# Patient Record
Sex: FEMALE | Race: WHITE
[De-identification: names, ages, dates, MRNs, and addresses within clinical notes are randomized per-mention and may not be internally consistent; named-entity substitution may affect disease eponyms.]

---

## 2017-03-23 ENCOUNTER — HOSPITAL ENCOUNTER (OUTPATIENT)
Dept: HOSPITAL 53 - M LABDRAWP | Age: 82
End: 2017-03-23
Attending: FAMILY MEDICINE
Payer: MEDICARE

## 2017-03-23 DIAGNOSIS — G31.84: ICD-10-CM

## 2017-03-23 DIAGNOSIS — E55.9: ICD-10-CM

## 2017-03-23 DIAGNOSIS — E78.2: Primary | ICD-10-CM

## 2017-03-23 LAB
ALBUMIN SERPL BCG-MCNC: 3.4 GM/DL (ref 3.2–5.2)
ALBUMIN/GLOB SERPL: 1.42 {RATIO} (ref 1–1.93)
ALP SERPL-CCNC: 83 U/L (ref 45–117)
ALT SERPL W P-5'-P-CCNC: 24 U/L (ref 12–78)
ANION GAP SERPL CALC-SCNC: 6 MEQ/L (ref 8–16)
AST SERPL-CCNC: 21 U/L (ref 15–37)
BILIRUB SERPL-MCNC: 0.6 MG/DL (ref 0.2–1)
BUN SERPL-MCNC: 15 MG/DL (ref 7–18)
CALCIUM SERPL-MCNC: 8.5 MG/DL (ref 8.8–10.2)
CHLORIDE SERPL-SCNC: 107 MEQ/L (ref 98–107)
CO2 SERPL-SCNC: 32 MEQ/L (ref 21–32)
CREAT SERPL-MCNC: 0.75 MG/DL (ref 0.55–1.02)
GFR SERPL CREATININE-BSD FRML MDRD: > 60 ML/MIN/{1.73_M2} (ref 39–?)
GLUCOSE SERPL-MCNC: 77 MG/DL (ref 83–110)
POTASSIUM SERPL-SCNC: 4.2 MEQ/L (ref 3.5–5.1)
PROT SERPL-MCNC: 5.8 GM/DL (ref 6.4–8.2)
SODIUM SERPL-SCNC: 145 MEQ/L (ref 136–145)
T4 FREE SERPL-MCNC: 0.79 NG/DL (ref 0.76–1.46)
VIT B12 SERPL-MCNC: 414 PG/ML (ref 247–911)

## 2017-03-24 LAB — PRETREATED FOLATE FOR RBCFOL: 11.9 NG/ML

## 2017-04-20 ENCOUNTER — HOSPITAL ENCOUNTER (OUTPATIENT)
Dept: HOSPITAL 53 - M RAD | Age: 82
End: 2017-04-20
Attending: PHYSICIAN ASSISTANT
Payer: MEDICARE

## 2017-04-20 DIAGNOSIS — R90.89: ICD-10-CM

## 2017-04-20 DIAGNOSIS — R20.0: Primary | ICD-10-CM

## 2017-04-20 PROCEDURE — 70450 CT HEAD/BRAIN W/O DYE: CPT

## 2017-04-20 NOTE — REP
NONCONTRAST BRAIN CT:

 

HISTORY:  Left arm numbness.

 

COMPARISON STUDY:  September 28, 2016.

 

CT FINDINGS:  Digital lateral  radiograph is unremarkable.  There is fairly

advanced vascular calcification in the distal vertebral and distal carotid

arteries.  No bony calvarial lesion or fracture is seen.  Visualized paranasal

sinuses are clear.  No intraorbital abnormality is seen.

 

On soft tissue window settings, there is moderate diffuse cerebral atrophy as

seen previously.  There are also moderate periventricular low density changes

consistent with small vessel atherosclerotic changes.  These are unchanged as

well from the comparison CT study.  There is no evidence of intracranial

hemorrhage.  No extra-axial fluid collection is seen.  No acute infarct is

visible.  No mass or midline shift is seen.

 

IMPRESSION: Moderate diffuse atrophy, vascular calcification, small vessel

changes.  No change from September 28, 2016.  No acute intracranial abnormality.

 

 

Signed by

Pop Ramsey MD 04/20/2017 12:41 P

## 2017-07-10 ENCOUNTER — HOSPITAL ENCOUNTER (OUTPATIENT)
Dept: HOSPITAL 53 - M SFHCWAGY | Age: 82
End: 2017-07-10
Attending: NURSE PRACTITIONER
Payer: MEDICARE

## 2017-07-10 DIAGNOSIS — R31.0: Primary | ICD-10-CM

## 2017-07-10 PROCEDURE — 87086 URINE CULTURE/COLONY COUNT: CPT

## 2017-07-10 PROCEDURE — 81002 URINALYSIS NONAUTO W/O SCOPE: CPT

## 2017-08-14 ENCOUNTER — HOSPITAL ENCOUNTER (OUTPATIENT)
Dept: HOSPITAL 53 - M SFHCPLAZ | Age: 82
End: 2017-08-14
Attending: FAMILY MEDICINE
Payer: MEDICARE

## 2017-08-14 DIAGNOSIS — E78.2: Primary | ICD-10-CM

## 2017-08-14 DIAGNOSIS — E55.9: ICD-10-CM

## 2017-08-14 LAB
ALBUMIN SERPL BCG-MCNC: 3.6 GM/DL (ref 3.2–5.2)
ALBUMIN/GLOB SERPL: 1.44 {RATIO} (ref 1–1.93)
ALP SERPL-CCNC: 100 U/L (ref 45–117)
ALT SERPL W P-5'-P-CCNC: 46 U/L (ref 12–78)
ANION GAP SERPL CALC-SCNC: 9 MEQ/L (ref 8–16)
AST SERPL-CCNC: 34 U/L (ref 15–37)
BILIRUB SERPL-MCNC: 0.7 MG/DL (ref 0.2–1)
BUN SERPL-MCNC: 17 MG/DL (ref 7–18)
CALCIUM SERPL-MCNC: 8.4 MG/DL (ref 8.8–10.2)
CHLORIDE SERPL-SCNC: 107 MEQ/L (ref 98–107)
CHOLEST SERPL-MCNC: 151 MG/DL (ref ?–200)
CO2 SERPL-SCNC: 29 MEQ/L (ref 21–32)
CREAT SERPL-MCNC: 0.64 MG/DL (ref 0.55–1.02)
GFR SERPL CREATININE-BSD FRML MDRD: > 60 ML/MIN/{1.73_M2} (ref 32–?)
GLUCOSE SERPL-MCNC: 82 MG/DL (ref 83–110)
POTASSIUM SERPL-SCNC: 4.3 MEQ/L (ref 3.5–5.1)
PROT SERPL-MCNC: 6.1 GM/DL (ref 6.4–8.2)
SODIUM SERPL-SCNC: 145 MEQ/L (ref 136–145)
T4 FREE SERPL-MCNC: 0.84 NG/DL (ref 0.76–1.46)
THYROPEROXIDASE AB SERPL IA-ACNC: 276.4 U/ML (ref ?–60)
TRIGL SERPL-MCNC: 153 MG/DL (ref ?–150)

## 2017-08-21 ENCOUNTER — HOSPITAL ENCOUNTER (OUTPATIENT)
Dept: HOSPITAL 53 - M SFHCPLAZ | Age: 82
End: 2017-08-21
Attending: FAMILY MEDICINE
Payer: MEDICARE

## 2017-08-21 DIAGNOSIS — R31.0: Primary | ICD-10-CM

## 2017-08-21 PROCEDURE — 87086 URINE CULTURE/COLONY COUNT: CPT

## 2017-08-21 PROCEDURE — 88108 CYTOPATH CONCENTRATE TECH: CPT

## 2017-08-21 PROCEDURE — 81001 URINALYSIS AUTO W/SCOPE: CPT

## 2017-08-22 ENCOUNTER — HOSPITAL ENCOUNTER (OUTPATIENT)
Dept: HOSPITAL 53 - M LABDRAWP | Age: 82
End: 2017-08-22
Attending: FAMILY MEDICINE
Payer: MEDICARE

## 2017-08-22 DIAGNOSIS — R31.0: Primary | ICD-10-CM

## 2017-09-19 ENCOUNTER — HOSPITAL ENCOUNTER (OUTPATIENT)
Dept: HOSPITAL 53 - M SFHCPLAZ | Age: 82
End: 2017-09-19
Attending: FAMILY MEDICINE
Payer: MEDICARE

## 2017-09-19 DIAGNOSIS — E06.3: ICD-10-CM

## 2017-09-19 DIAGNOSIS — E55.9: Primary | ICD-10-CM

## 2017-09-19 LAB
ALBUMIN SERPL BCG-MCNC: 3.4 GM/DL (ref 3.2–5.2)
ALBUMIN/GLOB SERPL: 1.31 {RATIO} (ref 1–1.93)
ALP SERPL-CCNC: 102 U/L (ref 45–117)
ALT SERPL W P-5'-P-CCNC: 31 U/L (ref 12–78)
ANION GAP SERPL CALC-SCNC: 7 MEQ/L (ref 8–16)
AST SERPL-CCNC: 20 U/L (ref 15–37)
BASOPHILS # BLD AUTO: 0 K/MM3 (ref 0–0.2)
BASOPHILS NFR BLD AUTO: 0.9 % (ref 0–1)
BILIRUB SERPL-MCNC: 0.4 MG/DL (ref 0.2–1)
BUN SERPL-MCNC: 16 MG/DL (ref 7–18)
CALCIUM SERPL-MCNC: 8.4 MG/DL (ref 8.8–10.2)
CHLORIDE SERPL-SCNC: 108 MEQ/L (ref 98–107)
CO2 SERPL-SCNC: 30 MEQ/L (ref 21–32)
CREAT SERPL-MCNC: 0.71 MG/DL (ref 0.55–1.02)
EOSINOPHIL # BLD AUTO: 0.1 K/MM3 (ref 0–0.5)
EOSINOPHIL NFR BLD AUTO: 1.8 % (ref 0–3)
ERYTHROCYTE [DISTWIDTH] IN BLOOD BY AUTOMATED COUNT: 13.1 % (ref 11.5–14.5)
GFR SERPL CREATININE-BSD FRML MDRD: > 60 ML/MIN/{1.73_M2} (ref 32–?)
GLUCOSE SERPL-MCNC: 84 MG/DL (ref 83–110)
LARGE UNSTAINED CELL #: 0.1 K/MM3 (ref 0–0.4)
LARGE UNSTAINED CELL %: 1.3 % (ref 0–4)
LYMPHOCYTES # BLD AUTO: 1.9 K/MM3 (ref 1.5–4.5)
LYMPHOCYTES NFR BLD AUTO: 32.7 % (ref 24–44)
MAGNESIUM SERPL-MCNC: 2.2 MG/DL (ref 1.8–2.4)
MCH RBC QN AUTO: 32.3 PG (ref 27–33)
MCHC RBC AUTO-ENTMCNC: 33.8 G/DL (ref 32–36.5)
MCV RBC AUTO: 95.5 FL (ref 80–96)
MONOCYTES # BLD AUTO: 0.2 K/MM3 (ref 0–0.8)
MONOCYTES NFR BLD AUTO: 4.3 % (ref 0–5)
NEUTROPHILS # BLD AUTO: 3.3 K/MM3 (ref 1.8–7.7)
NEUTROPHILS NFR BLD AUTO: 59 % (ref 36–66)
PLATELET # BLD AUTO: 204 K/MM3 (ref 150–450)
POTASSIUM SERPL-SCNC: 4.4 MEQ/L (ref 3.5–5.1)
PROT SERPL-MCNC: 6 GM/DL (ref 6.4–8.2)
SODIUM SERPL-SCNC: 145 MEQ/L (ref 136–145)
WBC # BLD AUTO: 5.6 K/MM3 (ref 4–10)

## 2017-10-27 ENCOUNTER — HOSPITAL ENCOUNTER (OUTPATIENT)
Dept: HOSPITAL 53 - M SFHCPLAZ | Age: 82
End: 2017-10-27
Attending: FAMILY MEDICINE
Payer: MEDICARE

## 2017-10-27 DIAGNOSIS — F03.90: Primary | ICD-10-CM

## 2017-10-27 LAB
T4 FREE SERPL-MCNC: 0.9 NG/DL (ref 0.76–1.46)
VIT B12 SERPL-MCNC: 364 PG/ML (ref 247–911)

## 2017-10-31 LAB — PRETREATED FOLATE FOR RBCFOL: 13.1 NG/ML

## 2017-11-22 ENCOUNTER — HOSPITAL ENCOUNTER (OUTPATIENT)
Dept: HOSPITAL 53 - M RAD | Age: 82
End: 2017-11-22
Attending: FAMILY MEDICINE
Payer: MEDICARE

## 2017-11-22 DIAGNOSIS — F03.90: Primary | ICD-10-CM

## 2017-11-22 NOTE — REP
MRI brain without contrast:

 

History:  Mild dementia.  Comparison brain MRI study is reviewed from May 23,

2014.

 

Technique:  Axial and sagittal imaging planes are utilized for T1 and T2-weighted

scans.  Sequences include spin-echo, fast spin echo, FLAIR, and diffusion

weighted sequences.

 

MRI findings:  Craniocervical junction upper cervical cord are normal in

appearance.  No bony calvarial defect is seen.  No intraorbital abnormality is

observed.  There is no MR evidence of significant paranasal sinus disease.  There

is mild to moderate diffuse cerebral atrophy.  Diffusion weighted scans show no

evidence of acute ischemia or other cause of restricted diffusion.  There is no

evidence of intracranial hemorrhage.  No axial or extra-axial mass or fluid

collection is seen.  There are moderate small vessel atherosclerotic changes in

the periventricular white matter bilaterally above the tentorium.

 

Impression:

 

Diffuse atrophy and small vessel changes.  No acute intracranial lesion.

 

 

Signed by

Pop Ramsey MD 11/22/2017 01:40 P

## 2017-11-22 NOTE — REP
MR angiography the brain without contrast:

 

History:  Mild dementia.

 

Technique:  3-D time-of-flight MR angiography of the brain is acquired in the

usual fashion and maximal intensity projection images were generated in

rotational format about the vertical and horizontal axes.  In addition, source

axial T1-weighted images are viewed in cine mode.

 

MR angiographic findings:  The distal vertebral arteries are patent and

co-dominant.  Basilar artery is a little tortuous but widely patent.  The

posterior cerebral and superior cerebellar vessels are normal and symmetric.  The

distal internal carotid arteries are unremarkable.  Anterior and middle cerebral

arteries appear intact.  There is no visible berry aneurysm or arteriovenous

malformation.

 

Impression:

 

Unremarkable MR angiography the brain.

 

 

Signed by

Pop Ramsey MD 11/22/2017 11:37 A

## 2017-12-29 ENCOUNTER — HOSPITAL ENCOUNTER (OUTPATIENT)
Dept: HOSPITAL 53 - M SFHCPLAZ | Age: 82
End: 2017-12-29
Attending: FAMILY MEDICINE
Payer: MEDICARE

## 2017-12-29 DIAGNOSIS — E06.3: ICD-10-CM

## 2017-12-29 DIAGNOSIS — E53.8: Primary | ICD-10-CM

## 2017-12-29 LAB
BASO #: 0 10^3/UL (ref 0–0.2)
BASO %: 0.2 % (ref 0–1)
EOS #: 0.1 10^3/UL (ref 0–0.5)
EOSINOPHIL NFR BLD AUTO: 1.7 % (ref 0–3)
FERRITIN: 75 NG/ML (ref 8–252)
FREE T4: 0.92 NG/DL (ref 0.76–1.46)
IMMATURE GRANULOCYTE #: 0 10^3/UL (ref 0–0)
IMMATURE GRANULOCYTE %: 0.2 % (ref 0–0)
IRON SATN MFR SERPL: 18.8 % (ref 13.2–45)
LYMPH #: 2.1 10^3/UL (ref 1.5–4.5)
LYMPH %: 45.1 % (ref 24–44)
MEAN CORPUSCULAR HEMOGLOBIN: 31.4 PG (ref 27–33)
MEAN CORPUSCULAR HGB CONC: 33.2 G/DL (ref 32–36.5)
MEAN CORPUSCULAR VOLUME: 94.8 FL (ref 80–96)
MONO #: 0.3 10^3/UL (ref 0–0.8)
MONO %: 6.7 % (ref 0–5)
NEUTROPHILS #: 2.1 10^3/UL (ref 1.8–7.7)
NEUTROPHILS %: 46.1 % (ref 36–66)
NRBC BLD AUTO-RTO: 0 % (ref 0–0)
PLATELET COUNT, AUTOMATED: 191 10^3/UL (ref 150–450)
RED CELL DISTRIBUTION WIDTH: 12.7 % (ref 11.5–14.5)
TOTAL IRON BINDING CAPACITY: 245 UG/DL (ref 250–450)
VITAMIN B12 LEVEL: 808 PG/ML (ref 247–911)
WHITE BLOOD COUNT: 4.6 10^3/UL (ref 4–10)

## 2017-12-29 PROCEDURE — 83550 IRON BINDING TEST: CPT

## 2018-01-25 ENCOUNTER — HOSPITAL ENCOUNTER (OUTPATIENT)
Dept: HOSPITAL 53 - M WHC | Age: 83
End: 2018-01-25
Attending: FAMILY MEDICINE
Payer: MEDICARE

## 2018-01-25 DIAGNOSIS — R92.8: ICD-10-CM

## 2018-01-25 DIAGNOSIS — Z12.31: Primary | ICD-10-CM

## 2018-01-25 PROCEDURE — 77067 SCR MAMMO BI INCL CAD: CPT

## 2018-03-07 ENCOUNTER — HOSPITAL ENCOUNTER (OUTPATIENT)
Dept: HOSPITAL 53 - M SFHCLACO | Age: 83
End: 2018-03-07
Attending: PHYSICIAN ASSISTANT
Payer: MEDICARE

## 2018-03-07 DIAGNOSIS — N30.01: Primary | ICD-10-CM

## 2018-03-07 PROCEDURE — 87088 URINE BACTERIA CULTURE: CPT

## 2018-03-07 PROCEDURE — 87186 SC STD MICRODIL/AGAR DIL: CPT

## 2018-03-08 ENCOUNTER — HOSPITAL ENCOUNTER (EMERGENCY)
Dept: HOSPITAL 53 - M ED | Age: 83
Discharge: HOME | End: 2018-03-08
Payer: MEDICARE

## 2018-03-08 DIAGNOSIS — Z88.0: ICD-10-CM

## 2018-03-08 DIAGNOSIS — Z91.89: ICD-10-CM

## 2018-03-08 DIAGNOSIS — Z88.8: ICD-10-CM

## 2018-03-08 DIAGNOSIS — Z87.891: ICD-10-CM

## 2018-03-08 DIAGNOSIS — Z79.899: ICD-10-CM

## 2018-03-08 DIAGNOSIS — Z91.041: ICD-10-CM

## 2018-03-08 DIAGNOSIS — R11.2: ICD-10-CM

## 2018-03-08 DIAGNOSIS — Z88.6: ICD-10-CM

## 2018-03-08 DIAGNOSIS — N39.0: Primary | ICD-10-CM

## 2018-03-08 LAB
ALBUMIN/GLOBULIN RATIO: 1.32 (ref 1–1.93)
ALBUMIN: 3.7 GM/DL (ref 3.2–5.2)
ALKALINE PHOSPHATASE: 91 U/L (ref 45–117)
ALT SERPL W P-5'-P-CCNC: 15 U/L (ref 12–78)
ANION GAP: 8 MEQ/L (ref 8–16)
AST SERPL-CCNC: 15 U/L (ref 7–37)
BASO #: 0 10^3/UL (ref 0–0.2)
BASO %: 0.2 % (ref 0–1)
BILIRUB CONJ SERPL-MCNC: 0.2 MG/DL (ref 0–0.2)
BILIRUBIN,TOTAL: 0.6 MG/DL (ref 0.2–1)
BLOOD UREA NITROGEN: 14 MG/DL (ref 7–18)
CALCIUM LEVEL: 8.6 MG/DL (ref 8.8–10.2)
CARBON DIOXIDE LEVEL: 28 MEQ/L (ref 21–32)
CHLORIDE LEVEL: 102 MEQ/L (ref 98–107)
CREATININE FOR GFR: 0.76 MG/DL (ref 0.55–1.3)
EOS #: 0 10^3/UL (ref 0–0.5)
EOSINOPHIL NFR BLD AUTO: 0.3 % (ref 0–3)
GFR SERPL CREATININE-BSD FRML MDRD: > 60 ML/MIN/{1.73_M2} (ref 32–?)
GLUCOSE, FASTING: 109 MG/DL (ref 70–100)
HEMATOCRIT: 38.5 % (ref 36–47)
HEMOGLOBIN: 13.4 G/DL (ref 12–16)
IMMATURE GRANULOCYTE %: 0.3 % (ref 0–3)
LEUKOCYTE ESTERASE UR AUTO RFX: (no result)
LIPASE: 195 U/L (ref 73–393)
LYMPH #: 1.7 10^3/UL (ref 1.5–4.5)
LYMPH %: 25.5 % (ref 24–44)
MEAN CORPUSCULAR HEMOGLOBIN: 31.8 PG (ref 27–33)
MEAN CORPUSCULAR HGB CONC: 34.8 G/DL (ref 32–36.5)
MEAN CORPUSCULAR VOLUME: 91.2 FL (ref 80–96)
MONO #: 0.4 10^3/UL (ref 0–0.8)
MONO %: 5.7 % (ref 0–5)
NEUTROPHILS #: 4.4 10^3/UL (ref 1.8–7.7)
NEUTROPHILS %: 68 % (ref 36–66)
NRBC BLD AUTO-RTO: 0 % (ref 0–0)
PLATELET COUNT, AUTOMATED: 166 10^3/UL (ref 150–450)
POTASSIUM SERUM: 3.8 MEQ/L (ref 3.5–5.1)
RED BLOOD COUNT: 4.22 10^6/UL (ref 4–5.4)
RED CELL DISTRIBUTION WIDTH: 12.6 % (ref 11.5–14.5)
SODIUM LEVEL: 138 MEQ/L (ref 136–145)
SPECIFIC GRAVITY UR AUTO RFX: 1 (ref 1–1.03)
SQUAM EPITHELIAL CELL UR AURFX: 1 /HPF (ref 0–6)
TOTAL PROTEIN: 6.5 GM/DL (ref 6.4–8.2)
WHITE BLOOD COUNT: 6.5 10^3/UL (ref 4–10)

## 2018-03-08 RX ADMIN — SODIUM CHLORIDE 1 MLS/HR: 9 INJECTION, SOLUTION INTRAVENOUS at 17:30

## 2018-03-08 RX ADMIN — ONDANSETRON 1 MG: 2 INJECTION INTRAMUSCULAR; INTRAVENOUS at 17:44

## 2018-03-08 RX ADMIN — DEXTROSE MONOHYDRATE 1 MG: 50 INJECTION, SOLUTION INTRAVENOUS at 17:44

## 2018-03-13 ENCOUNTER — HOSPITAL ENCOUNTER (EMERGENCY)
Dept: HOSPITAL 53 - M ED | Age: 83
Discharge: HOME | End: 2018-03-13
Payer: MEDICARE

## 2018-03-13 DIAGNOSIS — Z88.1: ICD-10-CM

## 2018-03-13 DIAGNOSIS — Z91.041: ICD-10-CM

## 2018-03-13 DIAGNOSIS — Z88.8: ICD-10-CM

## 2018-03-13 DIAGNOSIS — Z87.891: ICD-10-CM

## 2018-03-13 DIAGNOSIS — Z91.81: ICD-10-CM

## 2018-03-13 DIAGNOSIS — Z88.0: ICD-10-CM

## 2018-03-13 DIAGNOSIS — Z79.899: ICD-10-CM

## 2018-03-13 DIAGNOSIS — Z85.038: ICD-10-CM

## 2018-03-13 DIAGNOSIS — R07.89: Primary | ICD-10-CM

## 2018-03-13 PROCEDURE — 71046 X-RAY EXAM CHEST 2 VIEWS: CPT

## 2018-03-13 RX ADMIN — ACETAMINOPHEN 1 MG: 325 TABLET ORAL at 14:15

## 2018-03-20 ENCOUNTER — HOSPITAL ENCOUNTER (EMERGENCY)
Dept: HOSPITAL 53 - M ED | Age: 83
Discharge: HOME | End: 2018-03-20
Payer: MEDICARE

## 2018-03-20 ENCOUNTER — HOSPITAL ENCOUNTER (OUTPATIENT)
Dept: HOSPITAL 53 - M SFHCPLAZ | Age: 83
End: 2018-03-20
Attending: NURSE PRACTITIONER
Payer: MEDICARE

## 2018-03-20 DIAGNOSIS — Z79.899: ICD-10-CM

## 2018-03-20 DIAGNOSIS — Z88.0: ICD-10-CM

## 2018-03-20 DIAGNOSIS — E07.9: ICD-10-CM

## 2018-03-20 DIAGNOSIS — K25.9: Primary | ICD-10-CM

## 2018-03-20 DIAGNOSIS — Z85.038: ICD-10-CM

## 2018-03-20 DIAGNOSIS — K27.9: Primary | ICD-10-CM

## 2018-03-20 DIAGNOSIS — F03.90: ICD-10-CM

## 2018-03-20 DIAGNOSIS — Z91.041: ICD-10-CM

## 2018-03-20 DIAGNOSIS — K59.09: ICD-10-CM

## 2018-03-20 DIAGNOSIS — Z87.891: ICD-10-CM

## 2018-03-20 DIAGNOSIS — Z88.8: ICD-10-CM

## 2018-03-20 DIAGNOSIS — Z86.19: ICD-10-CM

## 2018-03-20 LAB
ALBUMIN/GLOBULIN RATIO: 1.17 (ref 1–1.93)
ALBUMIN: 3.5 GM/DL (ref 3.2–5.2)
ALKALINE PHOSPHATASE: 85 U/L (ref 45–117)
ALT SERPL W P-5'-P-CCNC: 17 U/L (ref 12–78)
ANION GAP: 8 MEQ/L (ref 8–16)
AST SERPL-CCNC: 24 U/L (ref 7–37)
BASO #: 0 10^3/UL (ref 0–0.2)
BASO %: 0.2 % (ref 0–1)
BILIRUB CONJ SERPL-MCNC: 0.2 MG/DL (ref 0–0.2)
BILIRUBIN,TOTAL: 0.6 MG/DL (ref 0.2–1)
BLOOD UREA NITROGEN: 15 MG/DL (ref 7–18)
CALCIUM LEVEL: 9.2 MG/DL (ref 8.8–10.2)
CARBON DIOXIDE LEVEL: 29 MEQ/L (ref 21–32)
CHLORIDE LEVEL: 100 MEQ/L (ref 98–107)
CK MB CFR.DF SERPL CALC: 2
CK SERPL-CCNC: 50 U/L (ref 26–192)
CK-MB VALUE MASS: 1 NG/ML (ref 0–3.6)
CREATININE FOR GFR: 0.58 MG/DL (ref 0.55–1.3)
EOS #: 0.1 10^3/UL (ref 0–0.5)
EOSINOPHIL NFR BLD AUTO: 0.6 % (ref 0–3)
GFR SERPL CREATININE-BSD FRML MDRD: > 60 ML/MIN/{1.73_M2} (ref 32–?)
GLUCOSE, FASTING: 107 MG/DL (ref 70–100)
HEMATOCRIT: 40.9 % (ref 36–47)
HEMOGLOBIN: 14 G/DL (ref 12–16)
IMMATURE GRANULOCYTE %: 0.5 % (ref 0–3)
LIPASE: 321 U/L (ref 73–393)
LYMPH #: 1.7 10^3/UL (ref 1.5–4.5)
LYMPH %: 20.2 % (ref 24–44)
MEAN CORPUSCULAR HEMOGLOBIN: 31.3 PG (ref 27–33)
MEAN CORPUSCULAR HGB CONC: 34.2 G/DL (ref 32–36.5)
MEAN CORPUSCULAR VOLUME: 91.5 FL (ref 80–96)
MONO #: 0.4 10^3/UL (ref 0–0.8)
MONO %: 4.5 % (ref 0–5)
NEUTROPHILS #: 6.1 10^3/UL (ref 1.8–7.7)
NEUTROPHILS %: 74 % (ref 36–66)
NRBC BLD AUTO-RTO: 0 % (ref 0–0)
PLATELET COUNT, AUTOMATED: 231 10^3/UL (ref 150–450)
POTASSIUM SERUM: 4.6 MEQ/L (ref 3.5–5.1)
RED BLOOD COUNT: 4.47 10^6/UL (ref 4–5.4)
RED CELL DISTRIBUTION WIDTH: 12.7 % (ref 11.5–14.5)
SODIUM LEVEL: 137 MEQ/L (ref 136–145)
TOTAL PROTEIN: 6.5 GM/DL (ref 6.4–8.2)
TROPONIN I: < 0.02 NG/ML (ref ?–0.1)
WHITE BLOOD COUNT: 8.3 10^3/UL (ref 4–10)

## 2018-03-20 PROCEDURE — 74245: CPT

## 2018-03-20 PROCEDURE — 86677 HELICOBACTER PYLORI ANTIBODY: CPT

## 2018-03-20 RX ADMIN — SODIUM CHLORIDE 1 ML: 9 INJECTION, SOLUTION INTRAVENOUS at 08:00

## 2018-03-22 LAB — H PYLORI IGG SER IA-ACNC: 0.12 (ref 0–0.79)

## 2018-03-26 ENCOUNTER — HOSPITAL ENCOUNTER (OUTPATIENT)
Dept: HOSPITAL 53 - M SFHCPLAZ | Age: 83
End: 2018-03-26
Attending: FAMILY MEDICINE
Payer: MEDICARE

## 2018-03-26 DIAGNOSIS — E06.3: ICD-10-CM

## 2018-03-26 DIAGNOSIS — R27.0: ICD-10-CM

## 2018-03-26 DIAGNOSIS — C18.9: ICD-10-CM

## 2018-03-26 DIAGNOSIS — I87.2: ICD-10-CM

## 2018-03-26 DIAGNOSIS — K30: ICD-10-CM

## 2018-03-26 DIAGNOSIS — F03.90: ICD-10-CM

## 2018-03-26 DIAGNOSIS — E55.9: ICD-10-CM

## 2018-03-26 DIAGNOSIS — L57.0: ICD-10-CM

## 2018-03-26 DIAGNOSIS — M50.30: ICD-10-CM

## 2018-03-26 DIAGNOSIS — E53.8: ICD-10-CM

## 2018-03-26 DIAGNOSIS — R51: ICD-10-CM

## 2018-03-26 DIAGNOSIS — K31.2: Primary | ICD-10-CM

## 2018-03-26 DIAGNOSIS — R31.0: ICD-10-CM

## 2018-03-26 DIAGNOSIS — E78.2: ICD-10-CM

## 2018-03-26 LAB
ALBUMIN/GLOBULIN RATIO: 1.17 (ref 1–1.93)
ALBUMIN: 3.4 GM/DL (ref 3.2–5.2)
ALKALINE PHOSPHATASE: 89 U/L (ref 45–117)
ALT SERPL W P-5'-P-CCNC: 17 U/L (ref 12–78)
ANION GAP: 9 MEQ/L (ref 8–16)
AST SERPL-CCNC: 18 U/L (ref 7–37)
BASO #: 0 10^3/UL (ref 0–0.2)
BASO %: 0.3 % (ref 0–1)
BILIRUBIN,TOTAL: 0.5 MG/DL (ref 0.2–1)
BLOOD UREA NITROGEN: 14 MG/DL (ref 7–18)
CALCIUM LEVEL: 8.8 MG/DL (ref 8.8–10.2)
CARBON DIOXIDE LEVEL: 29 MEQ/L (ref 21–32)
CHLORIDE LEVEL: 101 MEQ/L (ref 98–107)
CREATININE FOR GFR: 0.63 MG/DL (ref 0.55–1.3)
EOS #: 0.1 10^3/UL (ref 0–0.5)
EOSINOPHIL NFR BLD AUTO: 0.7 % (ref 0–3)
GFR SERPL CREATININE-BSD FRML MDRD: > 60 ML/MIN/{1.73_M2} (ref 32–?)
GLUCOSE, FASTING: 84 MG/DL (ref 70–100)
HEMATOCRIT: 40.4 % (ref 36–47)
HEMOGLOBIN: 13.7 G/DL (ref 12–16)
IMMATURE GRANULOCYTE %: 0.4 % (ref 0–3)
LIPASE: 2185 U/L (ref 73–393)
LYMPH #: 2 10^3/UL (ref 1.5–4.5)
LYMPH %: 25.6 % (ref 24–44)
MEAN CORPUSCULAR HEMOGLOBIN: 31.2 PG (ref 27–33)
MEAN CORPUSCULAR HGB CONC: 33.9 G/DL (ref 32–36.5)
MEAN CORPUSCULAR VOLUME: 92 FL (ref 80–96)
MONO #: 0.4 10^3/UL (ref 0–0.8)
MONO %: 4.7 % (ref 0–5)
NEUTROPHILS #: 5.2 10^3/UL (ref 1.8–7.7)
NEUTROPHILS %: 68.3 % (ref 36–66)
NRBC BLD AUTO-RTO: 0 % (ref 0–0)
PLATELET COUNT, AUTOMATED: 239 10^3/UL (ref 150–450)
POTASSIUM SERUM: 3.6 MEQ/L (ref 3.5–5.1)
RED BLOOD COUNT: 4.39 10^6/UL (ref 4–5.4)
RED CELL DISTRIBUTION WIDTH: 12.6 % (ref 11.5–14.5)
RETIC HEMOGLOBIN EQUIVALENT: 36 PG (ref 24–36)
RETICS/RBC NFR: 1 % (ref 0.5–1.5)
RETICULOCYTE #: 42.6 10^9/L (ref 17–77)
SODIUM LEVEL: 139 MEQ/L (ref 136–145)
TOTAL PROTEIN: 6.3 GM/DL (ref 6.4–8.2)
WHITE BLOOD COUNT: 7.7 10^3/UL (ref 4–10)

## 2018-03-26 PROCEDURE — 82378 CARCINOEMBRYONIC ANTIGEN: CPT

## 2018-03-27 ENCOUNTER — HOSPITAL ENCOUNTER (OUTPATIENT)
Dept: HOSPITAL 53 - M RAD | Age: 83
LOS: 1 days | End: 2018-03-28
Attending: FAMILY MEDICINE
Payer: MEDICARE

## 2018-03-27 DIAGNOSIS — K25.9: Primary | ICD-10-CM

## 2018-03-27 DIAGNOSIS — R93.3: ICD-10-CM

## 2018-03-27 DIAGNOSIS — K76.89: ICD-10-CM

## 2018-03-27 LAB
CANCER AG19-9 SERPL-ACNC: 20.7 U/ML (ref ?–35)
CARCINOEMBRYONIC ANTIGEN: < 0.5 NG/ML (ref ?–2.5)

## 2018-03-28 ENCOUNTER — HOSPITAL ENCOUNTER (OUTPATIENT)
Dept: HOSPITAL 53 - M MSPAV | Age: 83
Setting detail: OBSERVATION
LOS: 1 days | Discharge: HOME | End: 2018-03-29
Attending: FAMILY MEDICINE | Admitting: FAMILY MEDICINE
Payer: MEDICARE

## 2018-03-28 DIAGNOSIS — K25.9: ICD-10-CM

## 2018-03-28 DIAGNOSIS — K76.89: ICD-10-CM

## 2018-03-28 DIAGNOSIS — K29.70: ICD-10-CM

## 2018-03-28 DIAGNOSIS — Z87.891: ICD-10-CM

## 2018-03-28 DIAGNOSIS — M50.30: ICD-10-CM

## 2018-03-28 DIAGNOSIS — Z79.899: ICD-10-CM

## 2018-03-28 DIAGNOSIS — R31.0: ICD-10-CM

## 2018-03-28 DIAGNOSIS — E55.9: ICD-10-CM

## 2018-03-28 DIAGNOSIS — L57.0: ICD-10-CM

## 2018-03-28 DIAGNOSIS — E53.8: ICD-10-CM

## 2018-03-28 DIAGNOSIS — Z85.038: ICD-10-CM

## 2018-03-28 DIAGNOSIS — K26.9: Primary | ICD-10-CM

## 2018-03-28 DIAGNOSIS — R27.0: ICD-10-CM

## 2018-03-28 DIAGNOSIS — R93.3: ICD-10-CM

## 2018-03-28 DIAGNOSIS — R51: ICD-10-CM

## 2018-03-28 DIAGNOSIS — I87.2: ICD-10-CM

## 2018-03-28 DIAGNOSIS — E78.2: ICD-10-CM

## 2018-03-28 DIAGNOSIS — F03.90: ICD-10-CM

## 2018-03-28 DIAGNOSIS — K31.2: ICD-10-CM

## 2018-03-28 DIAGNOSIS — E06.3: ICD-10-CM

## 2018-03-28 LAB
ALBUMIN/GLOBULIN RATIO: 1.21 (ref 1–1.93)
ALBUMIN: 3.4 GM/DL (ref 3.2–5.2)
ALKALINE PHOSPHATASE: 79 U/L (ref 45–117)
ALT SERPL W P-5'-P-CCNC: 14 U/L (ref 12–78)
ANION GAP: 7 MEQ/L (ref 8–16)
AST SERPL-CCNC: 14 U/L (ref 7–37)
BASO #: 0 10^3/UL (ref 0–0.2)
BASO %: 0.2 % (ref 0–1)
BILIRUBIN,TOTAL: 0.3 MG/DL (ref 0.2–1)
BLOOD UREA NITROGEN: 18 MG/DL (ref 7–18)
CALCIUM LEVEL: 9 MG/DL (ref 8.8–10.2)
CARBON DIOXIDE LEVEL: 29 MEQ/L (ref 21–32)
CHLORIDE LEVEL: 101 MEQ/L (ref 98–107)
CHOLESTEROL LEVEL: 141 MG/DL (ref ?–200)
CK SERPL-CCNC: 32 U/L (ref 26–192)
CREATININE FOR GFR: 0.77 MG/DL (ref 0.55–1.3)
EOS #: 0 10^3/UL (ref 0–0.5)
EOSINOPHIL NFR BLD AUTO: 0 % (ref 0–3)
GFR SERPL CREATININE-BSD FRML MDRD: > 60 ML/MIN/{1.73_M2} (ref 32–?)
GLUCOSE, FASTING: 115 MG/DL (ref 70–100)
HEMATOCRIT: 39.2 % (ref 36–47)
HEMOGLOBIN: 13.6 G/DL (ref 12–16)
IMMATURE GRANULOCYTE %: 0.4 % (ref 0–3)
LDH SERPL L TO P-CCNC: 149 U/L (ref 84–246)
LIPASE: 112 U/L (ref 73–393)
LYMPH #: 1.2 10^3/UL (ref 1.5–4.5)
LYMPH %: 22.9 % (ref 24–44)
MAGNESIUM LEVEL: 2.1 MG/DL (ref 1.8–2.4)
MEAN CORPUSCULAR HEMOGLOBIN: 31.9 PG (ref 27–33)
MEAN CORPUSCULAR HGB CONC: 34.7 G/DL (ref 32–36.5)
MEAN CORPUSCULAR VOLUME: 92 FL (ref 80–96)
MONO #: 0.1 10^3/UL (ref 0–0.8)
MONO %: 1 % (ref 0–5)
NEUTROPHILS #: 3.9 10^3/UL (ref 1.8–7.7)
NEUTROPHILS %: 75.5 % (ref 36–66)
NRBC BLD AUTO-RTO: 0 % (ref 0–0)
PHOSPHORUS LEVEL: 3.9 MG/DL (ref 2.5–4.9)
PLATELET COUNT, AUTOMATED: 239 10^3/UL (ref 150–450)
POTASSIUM SERUM: 3.9 MEQ/L (ref 3.5–5.1)
RED BLOOD COUNT: 4.26 10^6/UL (ref 4–5.4)
RED CELL DISTRIBUTION WIDTH: 12.8 % (ref 11.5–14.5)
SODIUM LEVEL: 137 MEQ/L (ref 136–145)
TOTAL PROTEIN: 6.2 GM/DL (ref 6.4–8.2)
TRIGLYCERIDES LEVEL: 79 MG/DL (ref ?–150)
WHITE BLOOD COUNT: 5.1 10^3/UL (ref 4–10)

## 2018-03-28 PROCEDURE — 43239 EGD BIOPSY SINGLE/MULTIPLE: CPT

## 2018-03-28 RX ADMIN — RIVASTIGMINE TARTRATE 1 MG: 1.5 CAPSULE ORAL at 21:18

## 2018-03-28 RX ADMIN — SUCRALFATE 1 GM: 1 SUSPENSION ORAL at 21:18

## 2018-03-28 RX ADMIN — PANTOPRAZOLE SODIUM 1 MG: 40 TABLET, DELAYED RELEASE ORAL at 21:18

## 2018-03-28 RX ADMIN — POTASSIUM CHLORIDE AND SODIUM CHLORIDE 1 MLS/HR: 900; 150 INJECTION, SOLUTION INTRAVENOUS at 15:40

## 2018-03-28 RX ADMIN — SUCRALFATE 1 GM: 1 SUSPENSION ORAL at 18:51

## 2018-03-28 RX ADMIN — SUCRALFATE 1 GM: 1 SUSPENSION ORAL at 12:00

## 2018-03-29 LAB — MAGNESIUM LEVEL: 2.2 MG/DL (ref 1.8–2.4)

## 2018-03-29 RX ADMIN — SUCRALFATE 1 GM: 1 SUSPENSION ORAL at 14:16

## 2018-03-29 RX ADMIN — RIVASTIGMINE TARTRATE 1 MG: 1.5 CAPSULE ORAL at 08:19

## 2018-03-29 RX ADMIN — POTASSIUM CHLORIDE AND SODIUM CHLORIDE 1 MLS/HR: 900; 150 INJECTION, SOLUTION INTRAVENOUS at 02:33

## 2018-03-29 RX ADMIN — POTASSIUM CHLORIDE AND SODIUM CHLORIDE 1 MLS/HR: 900; 150 INJECTION, SOLUTION INTRAVENOUS at 14:55

## 2018-03-29 RX ADMIN — ACETAMINOPHEN 1 MG: 325 TABLET ORAL at 02:34

## 2018-03-29 RX ADMIN — CITALOPRAM HYDROBROMIDE 1 MG: 20 TABLET ORAL at 08:18

## 2018-03-29 RX ADMIN — PANTOPRAZOLE SODIUM 1 MG: 40 TABLET, DELAYED RELEASE ORAL at 08:18

## 2018-03-29 RX ADMIN — SUCRALFATE 1 GM: 1 SUSPENSION ORAL at 08:18

## 2018-05-22 ENCOUNTER — HOSPITAL ENCOUNTER (OUTPATIENT)
Dept: HOSPITAL 53 - M OPP | Age: 83
Discharge: HOME | End: 2018-05-22
Attending: SURGERY
Payer: MEDICARE

## 2018-05-22 DIAGNOSIS — Z91.041: ICD-10-CM

## 2018-05-22 DIAGNOSIS — Z88.8: ICD-10-CM

## 2018-05-22 DIAGNOSIS — K31.6: ICD-10-CM

## 2018-05-22 DIAGNOSIS — K25.7: ICD-10-CM

## 2018-05-22 DIAGNOSIS — Z85.038: ICD-10-CM

## 2018-05-22 DIAGNOSIS — K21.9: ICD-10-CM

## 2018-05-22 DIAGNOSIS — Z90.711: ICD-10-CM

## 2018-05-22 DIAGNOSIS — Z88.0: ICD-10-CM

## 2018-05-22 DIAGNOSIS — Z87.891: ICD-10-CM

## 2018-05-22 DIAGNOSIS — Z79.899: ICD-10-CM

## 2018-05-22 DIAGNOSIS — E78.00: ICD-10-CM

## 2018-05-22 DIAGNOSIS — Z09: Primary | ICD-10-CM

## 2018-05-22 PROCEDURE — 43239 EGD BIOPSY SINGLE/MULTIPLE: CPT

## 2018-05-22 RX ADMIN — SODIUM CHLORIDE, POTASSIUM CHLORIDE, SODIUM LACTATE AND CALCIUM CHLORIDE 1 MLS/HR: 600; 310; 30; 20 INJECTION, SOLUTION INTRAVENOUS at 13:45

## 2018-05-31 ENCOUNTER — HOSPITAL ENCOUNTER (OUTPATIENT)
Dept: HOSPITAL 53 - M SFHCPLAZ | Age: 83
End: 2018-05-31
Attending: FAMILY MEDICINE
Payer: MEDICARE

## 2018-05-31 DIAGNOSIS — E53.8: Primary | ICD-10-CM

## 2018-05-31 DIAGNOSIS — E78.2: ICD-10-CM

## 2018-05-31 LAB
BASO #: 0 10^3/UL (ref 0–0.2)
BASO %: 0.2 % (ref 0–1)
CHOLEST SERPL-MCNC: 143 MG/DL (ref ?–200)
CHOLESTEROL RISK RATIO: 2.34 (ref ?–5)
CK SERPL-CCNC: 49 U/L (ref 26–192)
CRP SERPL-MCNC: 0.64 MG/DL (ref 0–0.3)
EOS #: 0.1 10^3/UL (ref 0–0.5)
EOSINOPHIL NFR BLD AUTO: 3.1 % (ref 0–3)
HDLC SERPL-MCNC: 61 MG/DL (ref 40–?)
HEMATOCRIT: 37.4 % (ref 36–47)
HEMOGLOBIN: 12 G/DL (ref 12–15.5)
IMMATURE GRANULOCYTE %: 0.2 % (ref 0–3)
LDL CHOLESTEROL: 60.8 MG/DL (ref ?–100)
LYMPH #: 1.8 10^3/UL (ref 1.5–4.5)
LYMPH %: 39 % (ref 24–44)
MEAN CORPUSCULAR HEMOGLOBIN: 31.3 PG (ref 27–33)
MEAN CORPUSCULAR HGB CONC: 32.1 G/DL (ref 32–36.5)
MEAN CORPUSCULAR VOLUME: 97.4 FL (ref 80–96)
MONO #: 0.3 10^3/UL (ref 0–0.8)
MONO %: 7.2 % (ref 0–5)
NEUTROPHILS #: 2.3 10^3/UL (ref 1.8–7.7)
NEUTROPHILS %: 50.3 % (ref 36–66)
NONHDLC SERPL-MCNC: 82 MG/DL
NRBC BLD AUTO-RTO: 0 % (ref 0–0)
PLATELET COUNT, AUTOMATED: 188 10^3/UL (ref 150–450)
RED BLOOD COUNT: 3.84 10^6/UL (ref 4–5.4)
RED CELL DISTRIBUTION WIDTH: 12.9 % (ref 11.5–14.5)
RETIC HEMOGLOBIN EQUIVALENT: 34.4 PG (ref 24–36)
RETICS/RBC NFR: 1.1 % (ref 0.5–1.5)
RETICULOCYTE #: 40.7 10^9/L (ref 17–77)
TRIGLYCERIDES LEVEL: 106 MG/DL (ref ?–150)
VITAMIN B12 LEVEL: 973 PG/ML (ref 247–911)
WHITE BLOOD COUNT: 4.6 10^3/UL (ref 4–10)

## 2018-05-31 PROCEDURE — 82550 ASSAY OF CK (CPK): CPT

## 2018-07-31 ENCOUNTER — HOSPITAL ENCOUNTER (OUTPATIENT)
Dept: HOSPITAL 53 - M OPP | Age: 83
Discharge: HOME | End: 2018-07-31
Attending: SURGERY
Payer: MEDICARE

## 2018-07-31 DIAGNOSIS — Z88.8: ICD-10-CM

## 2018-07-31 DIAGNOSIS — Z90.710: ICD-10-CM

## 2018-07-31 DIAGNOSIS — F03.90: ICD-10-CM

## 2018-07-31 DIAGNOSIS — Z88.0: ICD-10-CM

## 2018-07-31 DIAGNOSIS — K21.9: ICD-10-CM

## 2018-07-31 DIAGNOSIS — Z87.891: ICD-10-CM

## 2018-07-31 DIAGNOSIS — Z91.041: ICD-10-CM

## 2018-07-31 DIAGNOSIS — K22.2: Primary | ICD-10-CM

## 2018-07-31 DIAGNOSIS — M12.9: ICD-10-CM

## 2018-07-31 DIAGNOSIS — E78.00: ICD-10-CM

## 2018-07-31 DIAGNOSIS — K31.6: ICD-10-CM

## 2018-07-31 DIAGNOSIS — K31.89: ICD-10-CM

## 2018-07-31 DIAGNOSIS — Z79.899: ICD-10-CM

## 2018-07-31 PROCEDURE — 43239 EGD BIOPSY SINGLE/MULTIPLE: CPT

## 2018-09-06 ENCOUNTER — HOSPITAL ENCOUNTER (OUTPATIENT)
Dept: HOSPITAL 53 - M SFHCPLAZ | Age: 83
End: 2018-09-06
Attending: FAMILY MEDICINE
Payer: MEDICARE

## 2018-09-06 DIAGNOSIS — E06.3: ICD-10-CM

## 2018-09-06 DIAGNOSIS — K27.9: Primary | ICD-10-CM

## 2018-09-06 DIAGNOSIS — E55.9: ICD-10-CM

## 2018-09-06 LAB
25(OH)D3 SERPL-MCNC: 57.4 NG/ML (ref 30–100)
ALBUMIN/GLOBULIN RATIO: 1.17 (ref 1–1.93)
ALBUMIN: 3.4 GM/DL (ref 3.2–5.2)
ALKALINE PHOSPHATASE: 98 U/L (ref 45–117)
ALT SERPL W P-5'-P-CCNC: 19 U/L (ref 12–78)
ANION GAP: 9 MEQ/L (ref 8–16)
AST SERPL-CCNC: 19 U/L (ref 7–37)
BASO #: 0 10^3/UL (ref 0–0.2)
BASO %: 0.3 % (ref 0–1)
BILIRUBIN,TOTAL: 0.7 MG/DL (ref 0.2–1)
BLOOD UREA NITROGEN: 16 MG/DL (ref 7–18)
CALCIUM LEVEL: 8.2 MG/DL (ref 8.8–10.2)
CARBON DIOXIDE LEVEL: 30 MEQ/L (ref 21–32)
CHLORIDE LEVEL: 106 MEQ/L (ref 98–107)
CREATININE FOR GFR: 0.73 MG/DL (ref 0.55–1.3)
EOS #: 0.1 10^3/UL (ref 0–0.5)
EOSINOPHIL NFR BLD AUTO: 2.7 % (ref 0–3)
FREE T4: 0.84 NG/DL (ref 0.76–1.46)
GFR SERPL CREATININE-BSD FRML MDRD: > 60 ML/MIN/{1.73_M2} (ref 32–?)
GLUCOSE, FASTING: 70 MG/DL (ref 70–100)
HEMATOCRIT: 39.6 % (ref 36–47)
HEMOGLOBIN: 12.8 G/DL (ref 12–15.5)
IMMATURE GRANULOCYTE %: 0.3 % (ref 0–3)
LYMPH #: 1.6 10^3/UL (ref 1.5–4.5)
LYMPH %: 44.1 % (ref 24–44)
MEAN CORPUSCULAR HEMOGLOBIN: 31.1 PG (ref 27–33)
MEAN CORPUSCULAR HGB CONC: 32.3 G/DL (ref 32–36.5)
MEAN CORPUSCULAR VOLUME: 96.4 FL (ref 80–96)
MONO #: 0.3 10^3/UL (ref 0–0.8)
MONO %: 6.8 % (ref 0–5)
NEUTROPHILS #: 1.7 10^3/UL (ref 1.8–7.7)
NEUTROPHILS %: 45.8 % (ref 36–66)
NRBC BLD AUTO-RTO: 0 % (ref 0–0)
PLATELET COUNT, AUTOMATED: 143 10^3/UL (ref 150–450)
POTASSIUM SERUM: 4.2 MEQ/L (ref 3.5–5.1)
PTH-INTACT SERPL-MCNC: 86.7 PG/ML (ref 18.5–88)
RED BLOOD COUNT: 4.11 10^6/UL (ref 4–5.4)
RED CELL DISTRIBUTION WIDTH: 13.4 % (ref 11.5–14.5)
RETIC HEMOGLOBIN EQUIVALENT: 36 PG (ref 24–36)
RETICS/RBC NFR: 1 % (ref 0.5–1.5)
RETICULOCYTE #: 40.7 10^9/L (ref 17–77)
SODIUM LEVEL: 145 MEQ/L (ref 136–145)
THYROID STIMULATING HORMONE: 3.58 UIU/ML (ref 0.36–3.74)
TOTAL PROTEIN: 6.3 GM/DL (ref 6.4–8.2)
WHITE BLOOD COUNT: 3.7 10^3/UL (ref 4–10)

## 2018-09-06 PROCEDURE — 84443 ASSAY THYROID STIM HORMONE: CPT

## 2018-09-07 LAB — H PYLORI IGG SER IA-ACNC: 0.04 (ref 0–0.79)

## 2019-03-19 ENCOUNTER — HOSPITAL ENCOUNTER (OUTPATIENT)
Dept: HOSPITAL 53 - M RAD | Age: 84
End: 2019-03-19
Attending: FAMILY MEDICINE
Payer: MEDICARE

## 2019-03-19 DIAGNOSIS — Z12.31: Primary | ICD-10-CM

## 2019-03-19 NOTE — REPMRS
Patient History

The patient states she has not had a clinical breast exam in over

a year.

Family history of breast cancer at age 43 in niece.

Benign core biopsy of the right breast.

Took hormonal contraceptives for 6 years.  Took estrogen for 3 

years.

 

Digital Mammo Screening Bilat: March 19, 2019 - Exam #: 

[id number]

Bilateral CC and MLO view(s) were taken.

 

Technologist: Azalea Ann Technologist

Prior study comparison: January 25, 2018, digital woman screen 

mammo, performed at [facility].  December 1, 2016, 

digital woman screen mammo, performed at [facility].  October 15, 2015, digital woman screen mammo, performed 

at [facility].

 

FINDINGS: The breast tissue is heterogeneously dense.  This may 

lower the sensitivity of mammography.  There is a moderate amount

of heterogeneously dense fibroglandular tissue which is fairly 

symmetric. There is no interval development of dominant mass, 

architectural distortion, or clustered microcalcification typical

of malignancy. There has been no change in the appearance of the

mammogram from the prior studies.

3-D tomosynthesis shows no additional findings.

 

Assessment: BI-RADS/ACR category 1 mammogram. Negative Mammogram.

 

Recommendation

Routine screening mammogram of both breasts in 1 year (for women 

over age 40).

 

This patient's Lifetime Breast Cancer RIsk is estimated at 1.0 %.

This mammogram was interpreted with the aid of an FDA-approved 

computer-aided dectection system.

 

Electronically Signed By: Truman Ramsey MD 03/19/19 [id number]

## 2019-10-01 ENCOUNTER — HOSPITAL ENCOUNTER (EMERGENCY)
Dept: HOSPITAL 53 - M ED | Age: 84
Discharge: HOME | End: 2019-10-01
Payer: COMMERCIAL

## 2019-10-01 VITALS — SYSTOLIC BLOOD PRESSURE: 123 MMHG | DIASTOLIC BLOOD PRESSURE: 67 MMHG

## 2019-10-01 VITALS — HEIGHT: 63 IN | WEIGHT: 134.28 LBS | BODY MASS INDEX: 23.79 KG/M2

## 2019-10-01 DIAGNOSIS — Z79.899: ICD-10-CM

## 2019-10-01 DIAGNOSIS — Z91.89: ICD-10-CM
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
## 2019-10-01 DIAGNOSIS — S60.222A: ICD-10-CM
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
## 2019-10-01 DIAGNOSIS — Z85.030: ICD-10-CM

## 2019-10-01 DIAGNOSIS — I10: ICD-10-CM

## 2019-10-01 DIAGNOSIS — S00.83XA: ICD-10-CM

## 2019-10-01 DIAGNOSIS — Z88.8: ICD-10-CM

## 2019-10-01 DIAGNOSIS — M19.032: ICD-10-CM

## 2019-10-01 DIAGNOSIS — S06.0X0A: Primary | ICD-10-CM
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
## 2019-10-01 DIAGNOSIS — Z88.0: ICD-10-CM

## 2019-10-01 DIAGNOSIS — Z88.6: ICD-10-CM

## 2019-10-01 DIAGNOSIS — R94.31: ICD-10-CM

## 2019-10-01 DIAGNOSIS — Z87.891: ICD-10-CM

## 2019-10-01 DIAGNOSIS — Z87.81: ICD-10-CM

## 2019-10-01 DIAGNOSIS — M47.812: ICD-10-CM

## 2019-10-01 LAB
BASOPHILS # BLD AUTO: 0 10^3/UL (ref 0–0.2)
BASOPHILS NFR BLD AUTO: 0.3 % (ref 0–1)
BUN SERPL-MCNC: 19 MG/DL (ref 7–18)
CALCIUM SERPL-MCNC: 9.1 MG/DL (ref 8.8–10.2)
CHLORIDE SERPL-SCNC: 108 MEQ/L (ref 98–107)
CK MB CFR.DF SERPL CALC: 1.47
CK MB SERPL-MCNC: 1.1 NG/ML (ref ?–3.6)
CK SERPL-CCNC: 75 U/L (ref 26–192)
CO2 SERPL-SCNC: 30 MEQ/L (ref 21–32)
CREAT SERPL-MCNC: 0.9 MG/DL (ref 0.55–1.3)
EOSINOPHIL # BLD AUTO: 0.1 10^3/UL (ref 0–0.5)
EOSINOPHIL NFR BLD AUTO: 1.6 % (ref 0–3)
GFR SERPL CREATININE-BSD FRML MDRD: > 60 ML/MIN/{1.73_M2} (ref 32–?)
GLUCOSE SERPL-MCNC: 88 MG/DL (ref 70–100)
HCT VFR BLD AUTO: 40.9 % (ref 36–47)
HGB BLD-MCNC: 13.4 G/DL (ref 12–15.5)
LYMPHOCYTES # BLD AUTO: 2 10^3/UL (ref 1.5–5)
LYMPHOCYTES NFR BLD AUTO: 32 % (ref 24–44)
MCH RBC QN AUTO: 31 PG (ref 27–33)
MCHC RBC AUTO-ENTMCNC: 32.8 G/DL (ref 32–36.5)
MCV RBC AUTO: 94.7 FL (ref 80–96)
MONOCYTES # BLD AUTO: 0.4 10^3/UL (ref 0–0.8)
MONOCYTES NFR BLD AUTO: 6.8 % (ref 0–5)
NEUTROPHILS # BLD AUTO: 3.7 10^3/UL (ref 1.5–8.5)
NEUTROPHILS NFR BLD AUTO: 59.1 % (ref 36–66)
PLATELET # BLD AUTO: 161 10^3/UL (ref 150–450)
POTASSIUM SERPL-SCNC: 4.1 MEQ/L (ref 3.5–5.1)
RBC # BLD AUTO: 4.32 10^6/UL (ref 4–5.4)
SODIUM SERPL-SCNC: 144 MEQ/L (ref 136–145)
TROPONIN I SERPL-MCNC: < 0.02 NG/ML (ref ?–0.1)
WBC # BLD AUTO: 6.2 10^3/UL (ref 4–10)

## 2019-10-01 NOTE — REP
CT study of the cervical spine without contrast:

 

History: Injury in a fall.

 

Technique:  Helical scanning is acquired and overlapping 2 mm high resolution

axial images were generated and reviewed at bone and soft tissue window settings.

Coronal and sagittal multiplanar re-formations images are generated.

 

CT findings:  There is no evidence of cervical spine element fracture.  No skull

base fracture is seen.  Cervical vertebral body heights are preserved.  Alignment

is normal.  Facet joints are normally aligned bilaterally at each cervical level

on multiplanar re-formations images.  There is no evidence of intraspinal or

paraspinal hematoma.  No extra vertebral abnormality is seen.

 

There is straightening.  Advanced degenerative disc disease is seen at seen 05/06

and C6-7.  Reactive sclerosis is seen about the C5-6 disc level.  There are mild

osteoarthritic facet changes in the cervical spine.  The right facet joint at

C4-5 shows the most advanced involvement.

 

Impression:

 

Degenerative spondylosis changes.  Otherwise negative CT study of the cervical

spine without contrast.  No fracture seen.

 

 

Electronically Signed by

Pop Ramsey MD 10/01/2019 03:43 P

## 2019-10-01 NOTE — REP
Left hand series:  Four views.

 

History:  Pain after fall.

 

Findings:  Four views of the left hand show diffuse osteopenia.  There is mild

soft tissue swelling dorsally over the carpus and a proximal metacarpals on the

lateral radiograph.  No acute fracture is seen.  There are accessory ossicles

adjacent to the pisiform and there is osteoarthritis at the wrist.  Old

post-traumatic deformity of the distal radius and ulnar styloid.

Chondrocalcinosis is noted at the wrist.

 

Impression:

 

No acute fracture seen.  Osteoporosis and osteoarthritis.

 

 

Electronically Signed by

Pop Ramsey MD 10/02/2019 07:43 A

## 2019-10-01 NOTE — REP
Left wrist series:  Four views.

 

History:  Left wrist injury.

 

Findings:  Four views of the left wrist demonstrate old healed distal radial

fracture with ununited ulnar styloid chip fracture.  There is osteoarthritis in

the distal radial ulnar articulation and in the radiocarpal articulation with

sclerosis and some remodeling.  There is diffuse osteoporosis.  No acute fracture

is appreciated.

 

Impression:

 

Osteoarthritic changes and old post-traumatic deformity healed fracture distal

radius and ulnar styloid.  No acute fracture seen.

 

 

Electronically Signed by

Pop Ramsey MD 10/02/2019 07:43 A

## 2019-10-01 NOTE — REP
CT brain without contrast:

 

History:  Head injury in a fall.

 

Comparison MRI brain study is from 22 November 2017.

 

CT findings:  Digital preliminary  radiographs are unremarkable.  The bony

calvarium is intact.  No skull fracture is appreciated.  Visualized paranasal

sinuses are clear.  No intraorbital lesion is seen.  There is generalized

moderate cerebral atrophy.  There are fairly extensive periventricular low

density areas consistent with small vessel atherosclerotic changes.  There is no

evidence of intracranial hemorrhage.  No mass, infarct, extra-axial fluid

collection, or midline shift is seen.

 

Impression:

 

Vascular calcification, generalized atrophy, small vessel changes.  No acute

intracranial abnormality.

 

 

Electronically Signed by

Pop Ramsey MD 10/02/2019 07:43 A

## 2019-10-02 NOTE — ECGEPIP
[facility] - ED

                                       

                                       Test Date:    2019-10-01

Pat Name:     AMY OLIVERA             Department:   

Patient ID:   [id number]                 Room:         -

Gender:       Female                   Technician:   haylie

:          1935               Requested By: TREVOR GAYLE

Order Number: [id number]     Reading MD:   Anabel Allen

                                 Measurements

Intervals                              Axis          

Rate:         78                       P:            

[place]:           0                        QRS:          -7

QRSD:         88                       T:            -12

QT:           385                                    

QTc:          440                                    

                           Interpretive Statements

SINUS RHYTHM PACS

LOW QRS VOLTAGE IN PRECORDIAL LEADS

NSTTW abnormalities

ABNORMAL RHYTHM ECG

INCREASED ECTOPY AND RATE 3/20/18

Electronically Signed on 10-2-2019 7:37:40 EDT by Anabel Allen

## 2020-04-28 ENCOUNTER — HOSPITAL ENCOUNTER (OUTPATIENT)
Dept: HOSPITAL 53 - M RAD | Age: 85
End: 2020-04-28
Attending: FAMILY MEDICINE
Payer: MEDICARE

## 2020-04-28 DIAGNOSIS — R93.6: ICD-10-CM

## 2020-04-28 DIAGNOSIS — M70.62: ICD-10-CM

## 2020-04-28 DIAGNOSIS — M25.452: ICD-10-CM

## 2020-04-28 DIAGNOSIS — M16.12: ICD-10-CM

## 2020-04-28 DIAGNOSIS — M25.552: Primary | ICD-10-CM

## 2020-04-28 PROCEDURE — 73723 MRI JOINT LWR EXTR W/O&W/DYE: CPT

## 2020-04-28 NOTE — REP
MRI LEFT HIP WITH AND WITHOUT CONTRAST:

 

MRI OF THE LEFT HIP:

 

TECHNIQUE:  Coronal T1, STIR through the pelvis, T2 fat sat, left hip all three

planes, axial oblique proton density fat sat left hip.  Axial T1 fat sat, post-IV

gadolinium axial and coronal T1 fat sat with the intravenous administration of 12

mL ProHance.

 

The visualized osseous structures demonstrate no suspicious bone lesion

particularly in the region of the proximal left femur.  There is abnormal signal

or enhancement in the proximal left femur. There are moderate arthritic changes

at the left hip joint with diffuse chondromalacia and associated spurring. There

is subchondral marrow edema and cystic change in the left acetabulum.  There is a

small joint effusion.  There is diffuse fraying and tearing of the left hip

labrum.  Mild increased signal on T2-weighted images is seen along the greater

trochanters bilaterally compatible with mild greater trochanteric

tendinobursitis.  Mild arthritic change and associated subchondral marrow edema

is seen at the superior aspect of the right sacroiliac joint.  Similar findings

are seen at the pubic symphysis.  No other abnormal osseous or soft tissue signal

abnormality or enhancement is seen.  Visualized intrapelvic structures are

unremarkable with no evidence of mass or free fluid.

 

IMPRESSION:

 

No suspicious bone lesion identified, particularly the region of the proximal

left femur.  Moderate arthritic changes left hip joint as discussed in detail

above with subchondral marrow edema and cystic change in the left acetabulum.

Small left hip joint effusion.  Diffuse fraying and tearing of the left hip

labrum.  Mild arthritic changes at the pubic symphysis and right sacroiliac

joint.  In addition, there is mild greater trochanteric tendinobursitis

bilaterally.

 

 

Electronically Signed by

Jaquan Zurita MD 04/28/2020 03:24 P

## 2020-09-12 ENCOUNTER — HOSPITAL ENCOUNTER (OUTPATIENT)
Dept: HOSPITAL 53 - M LABSMTC | Age: 85
End: 2020-09-12
Attending: ANESTHESIOLOGY
Payer: MEDICARE

## 2020-09-12 DIAGNOSIS — Z01.812: Primary | ICD-10-CM

## 2020-09-12 DIAGNOSIS — Z20.828: ICD-10-CM

## 2020-09-17 ENCOUNTER — HOSPITAL ENCOUNTER (OUTPATIENT)
Dept: HOSPITAL 53 - M SDC | Age: 85
Discharge: HOME | End: 2020-09-17
Attending: OPHTHALMOLOGY
Payer: MEDICARE

## 2020-09-17 VITALS — HEIGHT: 63 IN | WEIGHT: 138 LBS | BODY MASS INDEX: 24.45 KG/M2

## 2020-09-17 VITALS — DIASTOLIC BLOOD PRESSURE: 68 MMHG | SYSTOLIC BLOOD PRESSURE: 137 MMHG

## 2020-09-17 DIAGNOSIS — Z85.038: ICD-10-CM

## 2020-09-17 DIAGNOSIS — Z88.1: ICD-10-CM

## 2020-09-17 DIAGNOSIS — Z87.891: ICD-10-CM

## 2020-09-17 DIAGNOSIS — H25.12: Primary | ICD-10-CM

## 2020-09-17 DIAGNOSIS — Z90.710: ICD-10-CM

## 2020-09-17 DIAGNOSIS — E78.5: ICD-10-CM

## 2020-09-17 DIAGNOSIS — Z91.048: ICD-10-CM

## 2020-09-17 DIAGNOSIS — Z88.6: ICD-10-CM

## 2020-09-17 DIAGNOSIS — F03.90: ICD-10-CM

## 2020-09-17 DIAGNOSIS — K21.9: ICD-10-CM

## 2020-09-17 DIAGNOSIS — M12.9: ICD-10-CM

## 2020-09-17 PROCEDURE — 66984 XCAPSL CTRC RMVL W/O ECP: CPT

## 2020-09-19 ENCOUNTER — HOSPITAL ENCOUNTER (OUTPATIENT)
Dept: HOSPITAL 53 - M LABSMTC | Age: 85
End: 2020-09-19
Attending: ANESTHESIOLOGY
Payer: MEDICARE

## 2020-09-19 DIAGNOSIS — Z20.828: ICD-10-CM

## 2020-09-19 DIAGNOSIS — Z01.812: Primary | ICD-10-CM

## 2020-09-24 ENCOUNTER — HOSPITAL ENCOUNTER (OUTPATIENT)
Dept: HOSPITAL 53 - M SDC | Age: 85
Discharge: HOME | End: 2020-09-24
Attending: OPHTHALMOLOGY
Payer: MEDICARE

## 2020-09-24 VITALS — WEIGHT: 139.8 LBS | BODY MASS INDEX: 24.77 KG/M2 | HEIGHT: 63 IN

## 2020-09-24 VITALS — SYSTOLIC BLOOD PRESSURE: 163 MMHG | DIASTOLIC BLOOD PRESSURE: 72 MMHG

## 2020-09-24 DIAGNOSIS — H25.11: Primary | ICD-10-CM

## 2020-09-24 DIAGNOSIS — Z88.8: ICD-10-CM

## 2020-09-24 DIAGNOSIS — F03.90: ICD-10-CM

## 2020-09-24 DIAGNOSIS — Z79.899: ICD-10-CM

## 2020-09-24 DIAGNOSIS — Z88.0: ICD-10-CM

## 2020-09-24 DIAGNOSIS — K21.9: ICD-10-CM

## 2020-09-24 DIAGNOSIS — E78.5: ICD-10-CM

## 2020-09-24 DIAGNOSIS — Z88.1: ICD-10-CM

## 2020-09-24 DIAGNOSIS — Z87.891: ICD-10-CM

## 2020-09-24 PROCEDURE — 66984 XCAPSL CTRC RMVL W/O ECP: CPT

## 2020-11-04 ENCOUNTER — HOSPITAL ENCOUNTER (OUTPATIENT)
Dept: HOSPITAL 53 - M WHC | Age: 85
End: 2020-11-04
Attending: PHYSICIAN ASSISTANT
Payer: MEDICARE

## 2020-11-04 DIAGNOSIS — Z12.31: Primary | ICD-10-CM

## 2020-11-04 NOTE — REPMRS
Patient History

The patient states she has not had a clinical breast exam in over

a year.

Family history of breast cancer at age 43 in niece.

Benign core biopsy of the right breast.

Took hormonal contraceptives for 6 years.  Took estrogen for 3 

years.

 

3D TOMOSYNTHESIS WAS PERFORMED.

 

The Michael Freedman lifetime risk for breast cancer is 0.5%.

 

[place] breast density c.

 

Digital Woman Screen Mammo: November 4, 2020 - Exam #: 

[id number]

Bilateral CC and MLO view(s) were taken.

 

Technologist: Azalea Ann, Technologist

Prior study comparison: March 19, 2019, bilateral digital mammo 

screening bilat, performed at [facility].  January 25, 2018, digital woman screen mammo performed at [facility].

 

FINDINGS: The breast tissue is heterogeneously dense.  This may 

lower the sensitivity of mammography.  There has been no change 

in the appearance of the mammogram from the prior studies.  There

is a moderate amount of residual fibroglandular tissue which is 

fairly symmetric. There is no interval development of dominant 

mass, areas of architectural distortion, or clustered 

microcalcification typical of malignancy.

 

Assessment: BI-RADS/ACR category 1 mammogram. Negative Mammogram.

 

Recommendation

Routine screening mammogram in 1 year (for women over age 40).

This mammogram was interpreted with the aid of an FDA-approved 

computer-aided dectection system.

 

Electronically Signed By: Jaquan Zurita MD 11/04/20 [id number]

## 2020-12-29 ENCOUNTER — HOSPITAL ENCOUNTER (OUTPATIENT)
Dept: HOSPITAL 53 - M PLALAB | Age: 85
End: 2020-12-29
Attending: PHYSICIAN ASSISTANT
Payer: MEDICARE

## 2020-12-29 DIAGNOSIS — F03.90: ICD-10-CM

## 2020-12-29 DIAGNOSIS — E78.2: Primary | ICD-10-CM

## 2020-12-29 DIAGNOSIS — Z79.899: ICD-10-CM

## 2020-12-29 LAB
25(OH)D3 SERPL-MCNC: 60.9 NG/ML (ref 30–100)
ALBUMIN SERPL BCG-MCNC: 3.6 GM/DL (ref 3.2–5.2)
ALT SERPL W P-5'-P-CCNC: 25 U/L (ref 12–78)
BASOPHILS # BLD AUTO: 0 10^3/UL (ref 0–0.2)
BASOPHILS NFR BLD AUTO: 0.3 % (ref 0–1)
BILIRUB SERPL-MCNC: 0.7 MG/DL (ref 0.2–1)
BUN SERPL-MCNC: 16 MG/DL (ref 7–18)
CALCIUM SERPL-MCNC: 8.7 MG/DL (ref 8.8–10.2)
CHLORIDE SERPL-SCNC: 107 MEQ/L (ref 98–107)
CHOLEST SERPL-MCNC: 165 MG/DL (ref ?–200)
CHOLEST/HDLC SERPL: 2.8 {RATIO} (ref ?–5)
CO2 SERPL-SCNC: 31 MEQ/L (ref 21–32)
CREAT SERPL-MCNC: 0.74 MG/DL (ref 0.55–1.3)
EOSINOPHIL # BLD AUTO: 0.1 10^3/UL (ref 0–0.5)
EOSINOPHIL NFR BLD AUTO: 1.4 % (ref 0–3)
GFR SERPL CREATININE-BSD FRML MDRD: > 60 ML/MIN/{1.73_M2} (ref 32–?)
GLUCOSE SERPL-MCNC: 92 MG/DL (ref 70–100)
HCT VFR BLD AUTO: 42.5 % (ref 36–47)
HDLC SERPL-MCNC: 59 MG/DL (ref 40–?)
HGB BLD-MCNC: 13.6 G/DL (ref 12–15.5)
LDLC SERPL CALC-MCNC: 79 MG/DL (ref ?–100)
LYMPHOCYTES # BLD AUTO: 2.3 10^3/UL (ref 1.5–5)
LYMPHOCYTES NFR BLD AUTO: 36.2 % (ref 24–44)
MCH RBC QN AUTO: 30.8 PG (ref 27–33)
MCHC RBC AUTO-ENTMCNC: 32 G/DL (ref 32–36.5)
MCV RBC AUTO: 96.2 FL (ref 80–96)
MONOCYTES # BLD AUTO: 0.3 10^3/UL (ref 0–0.8)
MONOCYTES NFR BLD AUTO: 5.1 % (ref 0–5)
NEUTROPHILS # BLD AUTO: 3.5 10^3/UL (ref 1.5–8.5)
NEUTROPHILS NFR BLD AUTO: 56.7 % (ref 36–66)
NONHDLC SERPL-MCNC: 106 MG/DL
PLATELET # BLD AUTO: 153 10^3/UL (ref 150–450)
POTASSIUM SERPL-SCNC: 4.4 MEQ/L (ref 3.5–5.1)
PROT SERPL-MCNC: 6 GM/DL (ref 6.4–8.2)
RBC # BLD AUTO: 4.42 10^6/UL (ref 4–5.4)
SODIUM SERPL-SCNC: 144 MEQ/L (ref 136–145)
T4 SERPL-MCNC: 7.5 UG/DL (ref 4.5–12)
TRIGL SERPL-MCNC: 133 MG/DL (ref ?–150)
TSH SERPL DL<=0.005 MIU/L-ACNC: 2.46 UIU/ML (ref 0.36–3.74)
WBC # BLD AUTO: 6.2 10^3/UL (ref 4–10)

## 2021-04-23 ENCOUNTER — HOSPITAL ENCOUNTER (OUTPATIENT)
Dept: HOSPITAL 53 - M WHC | Age: 86
End: 2021-04-23
Attending: PHYSICIAN ASSISTANT
Payer: MEDICARE

## 2021-04-23 DIAGNOSIS — M85.851: Primary | ICD-10-CM

## 2021-04-23 DIAGNOSIS — M85.852: ICD-10-CM

## 2021-04-23 NOTE — DEXAMM
INDICATION:

M85.80 [facility] DISRD OF BONE DENSITY AND STRUCTURE.



COMPARISON:

Comparison DEXA exam is are from November 3, 2011, June 24, 2009, and November 4, 2004..



TECHNIQUE:

Bone density was measured using dual-energy x-ray absorptionmetry (DEXA).



FINDINGS:

AP SPINE L1-L4

BMD 1.149 g/cm2

Young Adult T-Score -0.4

Age Matched Z-Score 1.5.



LT FEMUR, TOTAL

BMD 0.815 g/cm2

Young Adult T-Score -1.5

Age Matched Z-Score 0.7.



LT NECK

BMD 0.856 g/cm2

Young Adult T-Score -1.3

Age Matched Z-Score 1.0.



RT FEMUR, TOTAL

BMD 0.829 g/cm2

Young Adult T-Score -1.4

Age Matched Z-Score 0.8.



RT NECK

BMD 0.779 g/cm2

Young Adult T-Score -1.9

Age Matched Z-Score 0.5.



IMPRESSION:

There is normal bone density of the spine.



There is low bone density of the left hip.





There is low bone density of the right hip.



The density of the spine has decreased 8.0% since the initial exam on November 4, 2004.





The density of the spine decreased 3.2% since most recent exam on November 3, 2011.





The density of the left hip has decreased 15.3% since initial exam on November 4, 2004.





The density of the left hip has decreased 11.3% since most recent exam on November 3,

2011.





The density of the right hip has decreased 20.2% since the initial exam on November 4, 2004.





The density of the right hip has decreased 13.7% since the most recent exam on

November 3, 2011.



FOLLOW-UP:

Recommendation for the next bone density exam: 2 years.





<Electronically signed by Truman Ramsey > 04/23/21 [id number]

## 2021-06-05 ENCOUNTER — HOSPITAL ENCOUNTER (EMERGENCY)
Dept: HOSPITAL 53 - M ED | Age: 86
Discharge: HOME | End: 2021-06-05
Payer: MEDICARE

## 2021-06-05 VITALS — SYSTOLIC BLOOD PRESSURE: 163 MMHG | DIASTOLIC BLOOD PRESSURE: 79 MMHG

## 2021-06-05 VITALS — HEIGHT: 62 IN | WEIGHT: 142.31 LBS | BODY MASS INDEX: 26.19 KG/M2

## 2021-06-05 DIAGNOSIS — Z79.899: ICD-10-CM
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
## 2021-06-05 DIAGNOSIS — H02.843: ICD-10-CM
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
## 2021-06-05 DIAGNOSIS — I67.82: ICD-10-CM

## 2021-06-05 DIAGNOSIS — E78.5: ICD-10-CM

## 2021-06-05 DIAGNOSIS — R22.0: ICD-10-CM
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
## 2021-06-05 DIAGNOSIS — G31.89: ICD-10-CM

## 2021-06-05 DIAGNOSIS — Z91.89: ICD-10-CM

## 2021-06-05 DIAGNOSIS — Z88.1: ICD-10-CM

## 2021-06-05 DIAGNOSIS — Z88.6: ICD-10-CM

## 2021-06-05 DIAGNOSIS — F03.90: ICD-10-CM

## 2021-06-05 DIAGNOSIS — Z88.0: ICD-10-CM

## 2021-06-05 DIAGNOSIS — S00.03XA: ICD-10-CM
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
## 2021-06-05 DIAGNOSIS — S01.112A: Primary | ICD-10-CM

## 2021-06-05 NOTE — ECGEPIP
[facility] - ED

                                       

                                       Test Date:    2021

Pat Name:     AMY OLIVERA             Department:   

Patient ID:   [id number]                 Room:         -

Gender:       Female                   Technician:   AR

:          1935               Requested By: SHAE CAMACHO PA-C

Order Number: [id number]     Reading MD:   Maja Lundborg-Gray

                                 Measurements

Intervals                              Axis          

Rate:         84                       P:            -1

IL:           120                      QRS:          4

QRSD:         82                       T:            -8

QT:           368                                    

QTc:          434                                    

                           Interpretive Statements

Sinus rhythm with premature atrial complexes

Nonspecific ST T wave changes 

Delayed R wave progression

 

cw 10/1/19 rate increased

Nonspecific ST T wave changes

Electronically Signed on 2021 19:55:20 EDT by Maja Lundborg-Gray

## 2021-06-05 NOTE — REPVR
PROCEDURE INFORMATION: 

Exam: CT Maxillofacial Without Contrast 

Exam date and time: 6/5/2021 6:19 PM 

Age: 86 years old 

Clinical indication: Eye pain; Right; Additional info: Frontal pain and 

swelling after fall 



TECHNIQUE: 

Imaging protocol: Computed tomography images of the face without contrast. 

Radiation optimization: All CT scans at this facility use at least one of these 

dose optimization techniques: automated exposure control; mA and/or kV 

adjustment per patient size (includes targeted exams where dose is matched to 

clinical indication); or iterative reconstruction. 



COMPARISON: 

CT Head without contrast 10/1/2019 3:21 PM 



FINDINGS: 

Orbital cavity: Orbits are normal. Globes are unremarkable. 

Bones/joints: No acute fracture. 

Paranasal sinuses: Mild mucosal thickening of the ethmoidal air cells and 

bilateral maxillary sinuses. 

Soft tissues: Moderate right inferior frontal and right periorbital soft tissue 

swelling. 



IMPRESSION: 

Moderate right inferior frontal and right periorbital soft tissue swelling.

No acute fracture or dislocation.



Electronically signed by: Aishwarya Iyer On 06/05/2021  19:21:54 PM

## 2021-06-05 NOTE — REPVR
PROCEDURE INFORMATION: 

Exam: CT Head Without Contrast 

Exam date and time: 6/5/2021 6:19 PM 

Age: 86 years old 

Clinical indication: Injury or trauma; Fall; Blunt trauma (contusions or 

hematomas); Additional info: Frontal pain and swelling after fall 



TECHNIQUE: 

Imaging protocol: Computed tomography of the head without contrast. 

Radiation optimization: All CT scans at this facility use at least one of these 

dose optimization techniques: automated exposure control; mA and/or kV 

adjustment per patient size (includes targeted exams where dose is matched to 

clinical indication); or iterative reconstruction. 



COMPARISON: 

CT Head without contrast 10/1/2019 3:21 PM 



FINDINGS: 

Brain: There is no acute intracranial abnormality. Moderate small vessel 

ischemic changes are seen. There is no mass, midline shift, or mass effect. 

Gray-white matter differentiation is preserved. There is no evidence of 

hemorrhage. There is no extra-axial fluid collection. Basal cisterns are 

patent. 

Cerebral ventricles: Moderate prominence of ventricles and sulci representing 

volume loss. 

Paranasal sinuses: Visualized sinuses are unremarkable. No fluid levels. 

Mastoid air cells: Visualized mastoid air cells are well aerated. 

Bones/joints: Unremarkable. No acute fracture. 

Soft tissues: Moderate right inferior frontal and right periorbital soft tissue 

swelling. 



IMPRESSION: 

1. Moderate volume loss and small vessel ischemic changes. 

2. No acute intracranial abnormality. 

3. Moderate right inferior frontal and right periorbital soft tissue swelling. 



Electronically signed by: Aishwarya Iyer On 06/05/2021  19:20:41 PM

## 2021-10-19 ENCOUNTER — HOSPITAL ENCOUNTER (OUTPATIENT)
Dept: HOSPITAL 53 - M LAB | Age: 86
End: 2021-10-19
Attending: PHYSICIAN ASSISTANT
Payer: MEDICARE

## 2021-10-19 DIAGNOSIS — R42: ICD-10-CM

## 2021-10-19 DIAGNOSIS — R35.0: ICD-10-CM

## 2021-10-19 DIAGNOSIS — R79.82: Primary | ICD-10-CM

## 2021-10-19 LAB
ALBUMIN SERPL BCG-MCNC: 3.3 GM/DL (ref 3.2–5.2)
ALT SERPL W P-5'-P-CCNC: 22 U/L (ref 12–78)
APPEARANCE UR: (no result)
BACTERIA UR QL AUTO: NEGATIVE
BASOPHILS # BLD AUTO: 0 10^3/UL (ref 0–0.2)
BASOPHILS NFR BLD AUTO: 0.4 % (ref 0–1)
BILIRUB SERPL-MCNC: 0.3 MG/DL (ref 0.2–1)
BILIRUB UR QL STRIP.AUTO: NEGATIVE
BUN SERPL-MCNC: 18 MG/DL (ref 7–18)
CALCIUM SERPL-MCNC: 9 MG/DL (ref 8.8–10.2)
CHLORIDE SERPL-SCNC: 108 MEQ/L (ref 98–107)
CO2 SERPL-SCNC: 27 MEQ/L (ref 21–32)
CREAT SERPL-MCNC: 0.9 MG/DL (ref 0.55–1.3)
CRP SERPL-MCNC: 3.08 MG/DL (ref 0–0.3)
EOSINOPHIL # BLD AUTO: 0.2 10^3/UL (ref 0–0.5)
EOSINOPHIL NFR BLD AUTO: 5.3 % (ref 0–3)
GFR SERPL CREATININE-BSD FRML MDRD: > 60 ML/MIN/{1.73_M2} (ref 32–?)
GLUCOSE SERPL-MCNC: 79 MG/DL (ref 70–100)
GLUCOSE UR QL STRIP.AUTO: NEGATIVE MG/DL
HCT VFR BLD AUTO: 38.1 % (ref 36–47)
HGB BLD-MCNC: 12.7 G/DL (ref 12–15.5)
HGB UR QL STRIP.AUTO: (no result)
KETONES UR QL STRIP.AUTO: NEGATIVE MG/DL
LEUKOCYTE ESTERASE UR QL STRIP.AUTO: NEGATIVE
LYMPHOCYTES # BLD AUTO: 1.1 10^3/UL (ref 1.5–5)
LYMPHOCYTES NFR BLD AUTO: 24.1 % (ref 24–44)
MCH RBC QN AUTO: 31.4 PG (ref 27–33)
MCHC RBC AUTO-ENTMCNC: 33.3 G/DL (ref 32–36.5)
MCV RBC AUTO: 94.1 FL (ref 80–96)
MONOCYTES # BLD AUTO: 0.4 10^3/UL (ref 0–0.8)
MONOCYTES NFR BLD AUTO: 9.5 % (ref 2–8)
MUCOUS THREADS URNS QL MICRO: (no result)
NEUTROPHILS # BLD AUTO: 2.7 10^3/UL (ref 1.5–8.5)
NEUTROPHILS NFR BLD AUTO: 60.3 % (ref 36–66)
NITRITE UR QL STRIP.AUTO: NEGATIVE
PH UR STRIP.AUTO: 5 UNITS (ref 5–9)
PLATELET # BLD AUTO: 147 10^3/UL (ref 150–450)
POTASSIUM SERPL-SCNC: 4.3 MEQ/L (ref 3.5–5.1)
PROT SERPL-MCNC: 6 GM/DL (ref 6.4–8.2)
PROT UR QL STRIP.AUTO: NEGATIVE MG/DL
RBC # BLD AUTO: 4.05 10^6/UL (ref 4–5.4)
RBC # UR AUTO: 7 /HPF (ref 0–3)
SODIUM SERPL-SCNC: 139 MEQ/L (ref 136–145)
SP GR UR STRIP.AUTO: 1.02 (ref 1–1.03)
SQUAMOUS #/AREA URNS AUTO: 3 /HPF (ref 0–6)
UROBILINOGEN UR QL STRIP.AUTO: 2 MG/DL (ref 0–2)
WBC # BLD AUTO: 4.5 10^3/UL (ref 4–10)
WBC #/AREA URNS AUTO: 2 /HPF (ref 0–3)

## 2021-10-21 ENCOUNTER — HOSPITAL ENCOUNTER (EMERGENCY)
Dept: HOSPITAL 53 - M ED | Age: 86
Discharge: HOME | End: 2021-10-21
Payer: MEDICARE

## 2021-10-21 VITALS — BODY MASS INDEX: 24.86 KG/M2 | WEIGHT: 140.3 LBS | HEIGHT: 63 IN

## 2021-10-21 VITALS — SYSTOLIC BLOOD PRESSURE: 132 MMHG | DIASTOLIC BLOOD PRESSURE: 68 MMHG

## 2021-10-21 DIAGNOSIS — M54.2: ICD-10-CM

## 2021-10-21 DIAGNOSIS — S00.83XA: ICD-10-CM

## 2021-10-21 DIAGNOSIS — Z91.89: ICD-10-CM
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
## 2021-10-21 DIAGNOSIS — S09.90XA: Primary | ICD-10-CM

## 2021-10-21 DIAGNOSIS — E78.5: ICD-10-CM

## 2021-10-21 DIAGNOSIS — R44.3: ICD-10-CM

## 2021-10-21 DIAGNOSIS — Z88.0: ICD-10-CM

## 2021-10-21 DIAGNOSIS — Z88.1: ICD-10-CM

## 2021-10-21 DIAGNOSIS — F03.90: ICD-10-CM

## 2021-10-21 DIAGNOSIS — Z88.6: ICD-10-CM
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
## 2021-10-21 DIAGNOSIS — Z79.899: ICD-10-CM
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
## 2021-10-21 LAB
ALBUMIN SERPL BCG-MCNC: 3.3 GM/DL (ref 3.2–5.2)
ALT SERPL W P-5'-P-CCNC: 24 U/L (ref 12–78)
APPEARANCE UR: (no result)
BACTERIA UR QL AUTO: NEGATIVE
BILIRUB SERPL-MCNC: 0.4 MG/DL (ref 0.2–1)
BILIRUB UR QL STRIP.AUTO: NEGATIVE
BUN SERPL-MCNC: 16 MG/DL (ref 7–18)
CALCIUM SERPL-MCNC: 8.7 MG/DL (ref 8.8–10.2)
CHLORIDE SERPL-SCNC: 106 MEQ/L (ref 98–107)
CO2 SERPL-SCNC: 27 MEQ/L (ref 21–32)
CREAT SERPL-MCNC: 0.92 MG/DL (ref 0.55–1.3)
EOSINOPHIL NFR BLD MANUAL: 4 % (ref 0–3)
GFR SERPL CREATININE-BSD FRML MDRD: > 60 ML/MIN/{1.73_M2} (ref 32–?)
GLUCOSE SERPL-MCNC: 103 MG/DL (ref 70–100)
GLUCOSE UR QL STRIP.AUTO: NEGATIVE MG/DL
HCT VFR BLD AUTO: 38.3 % (ref 36–47)
HGB BLD-MCNC: 12.8 G/DL (ref 12–15.5)
HGB UR QL STRIP.AUTO: (no result)
HYPOCHROMIA BLD QL SMEAR: (no result)
KETONES UR QL STRIP.AUTO: (no result) MG/DL
LEUKOCYTE ESTERASE UR QL STRIP.AUTO: NEGATIVE
LYMPHOCYTES NFR BLD MANUAL: 25 % (ref 16–44)
MCH RBC QN AUTO: 31 PG (ref 27–33)
MCHC RBC AUTO-ENTMCNC: 33.4 G/DL (ref 32–36.5)
MCV RBC AUTO: 92.7 FL (ref 80–96)
MONOCYTES NFR BLD MANUAL: 3 % (ref 0–5)
NEUTROPHILS NFR BLD MANUAL: 57 % (ref 28–66)
NITRITE UR QL STRIP.AUTO: NEGATIVE
PH UR STRIP.AUTO: 5 UNITS (ref 5–9)
PLATELET # BLD AUTO: 162 10^3/UL (ref 150–450)
PLATELET BLD QL SMEAR: (no result)
POTASSIUM SERPL-SCNC: 4.1 MEQ/L (ref 3.5–5.1)
PROT SERPL-MCNC: 6.3 GM/DL (ref 6.4–8.2)
PROT UR QL STRIP.AUTO: NEGATIVE MG/DL
RBC # BLD AUTO: 4.13 10^6/UL (ref 4–5.4)
RBC # UR AUTO: 0 /HPF (ref 0–3)
RSV RNA NPH QL NAA+PROBE: NEGATIVE
SODIUM SERPL-SCNC: 139 MEQ/L (ref 136–145)
SP GR UR STRIP.AUTO: 1.01 (ref 1–1.03)
SQUAMOUS #/AREA URNS AUTO: 8 /HPF (ref 0–6)
UROBILINOGEN UR QL STRIP.AUTO: 0.2 MG/DL (ref 0–2)
VARIANT LYMPHS NFR BLD MANUAL: 11 % (ref 0–5)
WBC # BLD AUTO: 4.9 10^3/UL (ref 4–10)
WBC #/AREA URNS AUTO: 0 /HPF (ref 0–3)

## 2021-10-21 NOTE — CCD
Full Chart - Jax Schrader MD [facility]

                             Created on: 2021



Helder Elvia MAGALI

External Reference #: [id number]

: 1935

Sex: Female



Demographics





                          Address                   [id number] [place] [address] 

[place]  [address]

 

                          Home Phone                [phone] 

 

                          Preferred Language        Unknown

 

                          Marital Status            

 

                          Pentecostalism Affiliation     Unknown

 

                          Race                      White

 

                          Ethnic Group              Unknown





Author





                          Author                    Jax Schrader MD [facility]

 

                          Organization              Jax Schrader MD [facility]

 

                          Address                   [address]  [address]



 

                          Phone                     [phone] 







Support





                Name            Relationship    Address         Phone

 

                    Anthony Pendleton  ECON                [id number] [place] [address] 

[place]  [address]                    [phone] 

 

                    Anthony Pendleton  Next Of Kin          [facility] 

[place]  [address]                    [phone] 







Care Team Providers





                    Care Team Member Name Role                Phone

 

                    Brooklyn Flroes D.O. PP                  [phone]

[phone]

 

                    Zack WILEY, Wallace Unavailable         +[phone] 







Reason for Referral

No Reason for Referral Recorded



Problems





Includes: Active, inactive, and resolved Problems



             All Visits   Onset Date - Time Resolved Date - Time Provider     Co

ndition Status

 

             Pseudophakia 2020 - 12:00AM              Wallace Dixon DO

 Active

 

                Cataract Senile Nuclear 2020 - 12:00AM 10/02/2020 - 12:43P

M Wallace Dixon DO                            Resolved

 

             Dry Eye Syndrome 2020 - 12:00AM              Wallace james DO Active

 

             Vitreous Disorders Degeneration 2020 - 12:00AM              TABITHA Dixon DO 

Active







Plan of Treatment





             Future Appointments Date         Time         Location     Provider

 

             1 Year Follow-Up 2021   12:30PM      Jax Schrader MD [facility]

 Wallace Dixon DO

 

             1 Year Follow-Up 2022   9:20AM       Jax Schrader MD [facility]

 Wallace Dixon DO







Assessments





Includes: Assessments for all patient encounters



                    Findings            Encounter           Date

 

                    Pseudophakia        3 - 4 WK Post CAT SX with Wallace zapata DO 2020

 

                    Pseudophakia        1 Week Post OP with Wallace Dixon DO

 10/02/2020

 

                    Nuclear senile cataract POST OP VISIT WITH PRE-OP with Marce Dixon DO 

2020

 

                    Pseudophakia        POST OP VISIT WITH PRE-OP with Wallace DEB

parismireya DO 2020

 

                    Nuclear senile cataract 1 WK PREOP FOR SURGERY with Wallace Dixon DO 

09/10/2020

 

                    Dry eye syndrome    Cataract Evaluation with Wallace rodgers DO 2020

 

                    Nuclear senile cataract Cataract Evaluation with Wallace randlemireya DO 2020

 

                    Vitreous degeneration Cataract Evaluation with Wallace Uli gomes DO 2020

 

                    [place]              OCT RETINA Technical Component with Edwin Schrader MD, FACS 

2016







Instructions





Instructions not supported for this document typeNo Instructions Recorded



Medical Equipment - Implanted Devices





Includes: Current and historical DevicesNo Medical Equipment Recorded



Medications





Includes: Current and historical Medications



                    Current Medications (continue as prescribed)                

      

 

                                          







                    BromSite 0.075% Ophthalmic Solution 09/10/2020          Prov

ider: 



                                         Wallace Dixon DO

 

                                        Diagnosis:          Age-related nuclear 

cataract, left eye

 

                                        Three days prior to surgery start one dr

op two times a day in the left eye, RUN 

CARD                                                 

 

                                          







                    Inveltys 1% Ophthalmic Suspension 09/10/2020          Provid

er: 



                                         Wallace Dixon DO

 

                                        Diagnosis:          Age-related nuclear 

cataract, left eye

 

                                        Day of surgery remove patch start one dr

op two times a day in the left eye, RUN 

CARD                                                 

 

                                          







                    Citalopram 10MG Oral Tablet 2020          Provider: 

 

                                        Diagnosis:           

 

                                                             

 

                                          







                    Vitamin B12 1000 MCG Oral Tablet 2020          Provide

r: 

 

                                        Diagnosis:           

 

                                                             

 

                                          







                    Vitamin D3 50 MCG (2000 UT) Oral Tablet 2020          

Provider: 

 

                                        Diagnosis:           

 

                                                             

 

                                          







                    Omeprazole 40 MG Oral Capsule Delayed Release 2020    

      Provider: 

 

                                        Diagnosis:           

 

                                                             

 

                                          







                    Memantine HCl 10 MG Oral Tablet 2020          Provider

: 

 

                                        Diagnosis:           

 

                                                             

 

                                          







                    Atorvastatin Calcium 10 MG Oral Tablet 2020          P

rovider: 

 

                                        Diagnosis:           

 

                                                             

 

                                          







                    Tylenol PM Extra Strength 500-25 MG Oral Tablet 2020  

        Provider: 

 

                                        Diagnosis:           

 

                                                             

 

                                          







                    Past Medications on file                      

 

                                          







                    Besivance 0.6% Ophthalmic Suspension 09/10/2020 - 10/02/2020

 Provider: 



                                         Wallace Dixon DO

 

                                        Diagnosis:          Age-related nuclear 

cataract, left eye

 

                    Three days prior to surgery start one drop three times a day

 in the left eye                      



 

                                          







                    Omeprazole 20 MG Oral Tablet Delayed Release 2020 -  Provider: 

 

                                        Diagnosis:           

 

                                                             

 

                                          







Medications Administered





Includes: Administered Medications in patient's chartNo Administered Medications
Recorded



Vital Signs





Includes: Vital Signs from 2020 through 2021No Vital Signs Recorded 
For Specified Dates



Results





Includes: Results from 2020 through 2021No Results Recorded For 
Specified Dates



History of Present Illness





History of Present Illness not supported for this document typeNo History of 
Present Illness Recorded



Social History





                          Description               Last Updated

 

                          Tobacco non-user          2021

 

                          No smoking status : Never smoked/ Recode: 4 2021

 

                          No tobacco use            2021

 

                          Not using alcohol         2021

 

                          Not using drugs           2021







Procedures and Surgical History





Includes: Procedures from 2020 through 2021



           Procedures Code       Diagnosis  Performing Provider Service Location

 Service Date

 

                                        Extracapsular cataract removal with intr

aocular lens implant (Right Side, 

Related procedure/service by same physician during Post Op) 27124               

      Age-related 

nuclear cataract, right eye Wallace Dixon DO [facility] 

2020

 

                                        Ophthalmic biometry - IOL Master with IO

L calculation (Professional Comp., Right

Side)           46712           Age-related nuclear cataract, right eye Wallace Villafuerte MD [facility]                      2020

 

                    Extracapsular cataract removal with intraocular lens implant

 (Left side) 81204               

Age-related nuclear cataract, left eye Wallace Dixon DO      [facility]                                  2020

 

                    Intermediate Eye Exam Established Patient (Signi/Sep Eval. &

 Man.) [id number]               Age-

related nuclear cataract, left eye Wallace Villafuerte MD

 [facility]

                                        09/10/2020

 

                                        Ophthalmic biometry - IOL Master with IO

L calculation (WAIVER OF LIABILITY ON 

FILE (ABN), Left side) [id number]               Age-related nuclear cataract, left ey

e Wallace Villafuerte MD [facility]  09/10/2020

 

                Medical Eye Exam [id number]           Age-related nuclear cataract, b

ilateral Wallace Villafuerte MD [facility]  2020







                          Surgical History          Last Updated

 

                                        History of extracapsular cataract extrac

tion  PCIOL OS by Dr. Dixon 

2020 ~PCIOL OD by Dr. Dixon 2021

 

                          Surgical / procedural history 2020







Medical History





Includes: Medical History in patient's chart



                          Description               Last Updated

 

                          No recent change in medical history 2021

 

                          History of arthritis      09/10/2020

 

                          History of hyperlipidemia 09/10/2020

 

                          Reported medical history  Ulcer, Memory loss 09/10/202

0







Family History





Includes: Family History in patient's chart



                          Description               Last Updated

 

                          Daughter's history of diabetes mellitus 09/10/2020

 

                          Paternal history of cataract 09/10/2020

 

                          Son's history of diabetes mellitus 09/10/2020

 

                          Sororal history of diabetes mellitus 09/10/2020







Review of Systems





Review of Systems not supported for this document typeNo Review of Systems 
Recorded



Mental Status





Mental Status not supported for this document type



                                        Description

 

                                        Oriented to time, place, and person

 

                                        Anxiety







Functional Status





Functional Status not supported for this document typeNo Functional Status 
Recorded



Physical Exam





Physical Exam not supported for this document typeNo Physical Exam Recorded



Immunizations





Includes: Immunizations in patient's chartNo Immunizations Recorded



Allergies





Includes: Active, inactive, and resolved AllergiesNo Known Allergies



Encounters





Includes: Encounters from 2020 through 2021



          Encounter Provider  Location  Date      Check-In Time Check-Out Time D

iagnosis

 

             TRIAGE NON URGENT Wallace Villafuerte MD [facility] 2021   

1:58PM                    3:54PM                     

 

             3 - 4 WK Post CAT SX Wallace Villafuerte MD [facility] 

2020   

2:07PM                    4:00PM                    Pseudophakia

 

           1 Week Post OP Wallace Villafuerte MD [facility] 10/02/20

20 12:17PM    

1:27PM                                  Pseudophakia

 

                    Extracapsular cataract removal w/IOL implant Wallace Hoover

in [facility]  2020 8:00AM 6:21AM           

 

                POST OP VISIT WITH PRE-OP Wallace Villafuerte MD [facility] 

2020          12:45PM             1:42PM              Pseudophakia, Catara

ct Senile Nuclear

 

                    Extracapsular cataract removal w/IOL implant Wallace Hoover

in [facility]  2020      6:02AM          6:04AM           

 

             1 WK PREOP FOR SURGERY Wallace Villafuerte MD [facility] 09/10/2020   

11:59AM                   12:58PM                   Cataract Senile Nuclear

 

             Cataract Evaluation Wallace Villafuerte MD [facility] 0

2020   

1:03PM                    2:53PM                    Dry Eye Syndrome, Cataract S

enile Nuclear, Vitreous Disorders 

Degeneration







Insurance





Includes: Active Insurance Policies



           Plan Name  Member ID  Group #    Subscriber Relationship Effective Da

shannan

 

           1 - [employer] MED[employer] ADVANTAGE [id number]              Elvia Devi

f        







Advance Directives





Includes: Current Advance DirectivesNo Advance Directives Recorded



Health Concerns





Includes: Active Health ConcernsNo Active Health Concerns Recorded



Goals





Includes: Active GoalsNo Active Goals Recorded



Interventions





Includes: Interventions for active GoalsNo Interventions Recorded



Evaluations & Outcomes





Includes: Evaluations & Outcomes for active GoalsNo Outcomes Recorded

## 2021-10-21 NOTE — CCD
Continuity of Care Document (CCD)

                             Created on: 10/19/2021



Helder Elviabar Quintana

External Reference #: [id number]

: 1935

Sex: Female



Demographics





                          Address                   [address] [place]  [address]

 

                          Home Phone                [phone]

 

                          Preferred Language        Unknown

 

                          Marital Status            

 

                          Orthodox Affiliation     Unknown

 

                          Race                      White

 

                          Ethnic Group              Declined to Specify/Unknown





Author





                          Author                    Elvia TOLENTINO

 

                          Organization              Unknown

 

                          Address                    [place]  [address]



 

                          Phone                     [phone]







Care Team Providers





                    Care Team Member Name Role                Phone

 

                    Brooklyn Flores D.O. AUTM                [phone]

 

                    KALYAN Bartholomew M.D. AUTM                [phone]

 

                    [facility] AUTM                [phone]

 

                    [facility] AUTM     

           [phone]

 

                    Omer Gutierrez M.D. AUTM                [phone]







Problems





                    Active Problems     Provider            Date

 

                    H/O: peptic ulcer   ELMER Kan Onset: 2019

 

                    Mixed hyperlipidemia ELMER Kan Onset: 2019

 

                    Mild recurrent major depression ELMER Kan Onset: 0

2019

 

                    Dementia            ELMER Kan Onset: 10/10/2019

 

                    Gastroesophageal reflux disease ELMER Kan Onset: 1

0/10/2019







Social History





                Type            Date            Description     Comments

 

                Birth Sex                       Unknown          

 

                ETOH Use                        Denies alcohol use  

 

                Tobacco Use     Start: Unknown  Patient has never smoked  

 

                Recreational Drug Use                 Denies Drug Use  

 

                Smoking Status  Reviewed: 21 Patient has never smoked  

 

                Exercise Type/Frequency                 Does not exercise  

 

                Sun Exposure                    Does not use sunscreen  

 

                Seat Belt/Car Seat                 Always uses seat belt  







Allergies and adverse reactions





             Active Allergies Criticality  Reaction | Severity Comments     Date

 

             Iodine       Unable to assess criticality                          

 2018







Medications





           Active Medications SIG        Qnty       Indications Ordering Provide

r Date

 

                          Bactrim DS                     800-160mg Tablets      

             take one 

tablet by mouth every 12 hours for ten days 20tabs              R35.0           

    PETRONA Palacios.O.                                    10/15/2021

 

                                        Calcium 600+D High Potency              

       600-400mg-Unit Tablets           

             1 by mouth twice a day 180tabs                   Brooklyn sherman, D.O. 2021

 

                          Prevagen Extra Strength                     20mg Capsu

les                   take

one capsule by mouth daily. 90caps                          PETRONA Palacios.O. 2021

 

                          Memantine HCL                     10mg Tablets        

           Take One Tablet

By Mouth Twice A Day 180tabs         F03.90          PETRONA Palacios.O. 

10/07/2020

 

                                        Fluticasone Propionate Nasal Spray      

               50mcg/Act Suspension     

                two sprays each nostril once daily 29.7ml          J06.9        

   PETRONA Yee.O.                            2020

 

                          Omeprazole                     40mg Capsules DR       

            take one 

capsule by mouth every day 90caps                          PETRONA Palacios.O. 2019

 

                          Diclofenac Sodium                     1% Gel          

         apply 3 grams to 

painful area of left elbow twice a day as needed 100units            M79.642    

         PETRONA Yee.O.                            04/10/2019

 

                          Citalopram Hydrobromide                     10mg Table

ts                   Take 

One Tablet By Mouth Every Day 90tabs                          Brooklyn sherman, D.O. 

 

                          Vitamin D3                     2000Unit Capsules      

             1 by mouth 

every day                                       Unknown         

 

                          Vitamin B-12                     1000mcg Tablets      

             1 by mouth 

every day                                       Unknown         

 

                          Atorvastatin Calcium                     10mg Tablets 

                  Take One

Tablet By Mouth Every Evening 90tabs                          Brooklyn sherman, D.O. 

 

                          Tylenol                     325mg Tablets             

      take 1-2 tabs daily 

as needed                                       Unknown         

 

                                        History Medications

 

                                        Tetanus/Diphtheria Toxoids-Adsorbed Adul

t                     2-2LF/0.5ML 

Suspension                   administer tetanus with pertussis at pharmacy. 5ml 

                

                          PETRONA Palacios.O. 2021 - 2021







Immunizations





             CPT Code     Status       Date         Vaccine      Lot #

 

                [id number]           Given           2021      Tetanus, Diphthe

aixa Toxoids/Acellular Pertussis Vaccine 7

Or >                                    [id number]

 

             [id number]        Given        2020   Pneumococcal Vaccine 23 [place]

nt 2Yrs Or Older  

 

                [id number]           Given           2019      Pneumococcal Con

jugate Vaccine 13 Valent For 

Intramuscular Use                        

 

             U-Flu        Given        2019   Influenza,Unspecified  







Vital Signs





                Date            Vital           Result          Comment

 

                10/15/2021  2:11pm BP Systolic     92 mmHg          

 

                    BP Diastolic        58 mmHg              

 

                    Height              61.7 inches         5'1.70"

 

                    Heart Rate          80 /min              

 

                    Respiratory Rate    12 /min              

 

                    Body Temperature    98.5 F             

 

                    O2 % BldC Oximetry  95 %                 

 

                    Ideal Body Weight   105 lb               

 

                2021 10:03am BP Systolic     124 mmHg         

 

                    BP Diastolic        74 mmHg              

 

                    Height              61.7 inches         5'1.70"

 

                    Weight              144.25 lb            

 

                    BMI (Body Mass Index) 26.6 kg/m2           

 

                    Heart Rate          74 /min              

 

                    Respiratory Rate    18 /min              

 

                    Body Temperature    97.8 F             

 

                    O2 % BldC Oximetry  95 %                 

 

                    Ideal Body Weight   105 lb               







Results





        Test    Acquired Date Facility Test    Result  H/L     Range   Note

 

                    Ua Routine          10/19/2021          [facility]

ng (Registration)

                                        [address] [place] [address] [phone] Appearance, Urine HAZY       Normal     Clear       

 

             Color, Urine YELLOW       Normal       Yellow        

 

             PH,Urine     5.0 units    Normal       5.0-9.0       

 

             Specific Gravity Urine Auto 1.018        Normal       1.002-1.035  

 

 

             Protein, Urine Auto NEGATIVE mg/dL Normal       Negative      

 

             Glucose, Urine (Ua) Auto NEGATIVE mg/dL Normal       Negative      

 

             Ketone, Urine Auto NEGATIVE mg/dL Normal       Negative      

 

             Urobilinogen, Urine Auto 2.0 mg/dL    High         0.0-2.0       

 

             Bilirubin, Urine Auto NEGATIVE     Normal       Negative      

 

             Nitrite, Urine Auto NEGATIVE     Normal       Negative      

 

             Leukocyte Esterase, Urine Auto NEGATIVE     Normal       Negative  

    

 

             Blood, Urine Blood 1+           High         Negative      

 

             WBC, Urine Auto 2 /HPF       Normal       0-3           

 

             RBC, Urine Auto 7 /HPF       High         0-3           

 

             Bacteria, Urine Auto NEGATIVE     Normal       Negative      

 

             Squamous Epithelial Cell Ur AU 3 /HPF       Normal       0-6       

    

 

             Mucus, Urine SMALL        Normal       Negative      

 

             Hyaline Cast, Urine Auto 0 /LPF       Normal       0-1           

 

                    Comprehensive Metabolic Profil 10/19/2021          [facility]

tient Testing (Registration)

                                        [address] [place] [address] [phone] Glucose, Fasting 79 mg/dL   Normal           

 

             Blood Urea Nitrogen 18 mg/dL     Normal       7-18          

 

             Creatinine For GFR 0.90 mg/dL   Normal       0.55-1.30     

 

             Glomerular Filtration Rate > 60.0       Normal       >32          1

 

             Sodium Level 139 mEq/L    Normal       136-145       

 

             Potassium Serum 4.3 mEq/L    Normal       3.5-5.1       

 

             Chloride Level 108 mEq/L    High                 

 

             Carbon Dioxide Level 27 mEq/L     Normal       21-32         

 

             Anion Gap    4 mEq/L      Low          8-16          

 

             Calcium Level 9.0 mg/dL    Normal       8.8-10.2      

 

             Ast/Sgot     18 U/L       Normal       7-37          

 

             Alt/SGPT     22 U/L       Normal       12-78         

 

             Alkaline Phosphatase 85 U/L       Normal               

 

             Bilirubin,Total 0.3 mg/dL    Normal       0.2-1.0       

 

             Total Protein 6.0 GM/DL    Low          6.4-8.2       

 

             Albumin      3.3 GM/DL    Normal       3.2-5.2       

 

             Albumin/Globulin Ratio 1.2          Normal       1.2-2.2       

 

                    CBC With Differential 10/19/2021          [facility] [place]

ting (Registration)

                                        [address], [place] [id number] [phone] White Blood Count 4.5 10     Normal     4.0-10.0    

 

             Red Blood Count 4.05 10      Normal       4.00-5.40     

 

             Hemoglobin   12.7 g/dL    Normal       12.0-15.5     

 

             Hematocrit   38.1 %       Normal       36.0-47.0     

 

             Mean Corpuscular Volume 94.1 fl      Normal       80.0-96.0     

 

             Mean Corpuscular Hemoglobin 31.4 pg      Normal       27.0-33.0    

 

 

             Mean Corpuscular HGB Conc 33.3 g/dL    Normal       32.0-36.5     

 

             Red Cell Distribution Width 13.0 %       Normal       11.5-14.5    

 

 

             Platelet Count, Automated 147 10       Low          150-450       

 

             Neutrophils % 60.3 %       Normal       36.0-66.0     

 

             Lymph %      24.1 %       Normal       24.0-44.0     

 

             Mono %       9.5 %        High         2.0-8.0       

 

             Eos %        5.3 %        High         0.0-3.0       

 

             Baso %       0.4 %        Normal       0.0-1.0       

 

             Immature Granulocyte % 0.4 %        Normal       0-3.0         

 

             Nucleated Red Blood Cell % 0.0 %        Normal       0-0           

 

             Neutrophils # 2.7 10       Normal       1.5-8.5       

 

             Lymph #      1.1 10       Low          1.5-5.0       

 

             Mono #       0.4 10       Normal       0.0-0.8       

 

             Eos #        0.2 10       Normal       0.0-0.5       

 

             Baso #       0.0 10       Normal       0.0-0.2       

 

                    Laboratory test finding 10/19/2021          [facility]

esting (Registration)

                                        [address] [place] [address] [phone] C Reactive Protein Quantitativ 3.08 mg/dL High       0

.00-0.30   

 

                    Ua Routine          10/18/2021          [facility]

[facility]

             Ua Specific Gravity <pending>                               

 

             Ua PH Test Strip <pending>                               

 

             Ua Color     <pending>                               

 

             Ua Appearance <pending>                               

 

             Ua WBC       <pending>                               

 

             Ua Protein   <pending>                               

 

             Urine Glucose QL <pending>                               

 

             Ua Ketones   <pending>                               

 

             Ua Bilirubin <pending>                               

 

             Urine Urobilinogen QN TS <pending>                               

 

             Urine Nitrite QL TS <pending>                               

 

             Ua Occult Blood <pending>                               

 

                    [facility]20[facility]

             Culture Urine Routine <pending>                               

 

                    CMP                 10/18/2021          [facility]

             Albumin Serum/Plasma <pending>                               

 

             Alt - SGPT   <pending>                               

 

             Calcium Ser/Plasma Mass/Vol <pending>                              

 

 

             Carbon Dioxide Ser/Plasm <pending>                               

 

             Chloride Serum/Plasma <pending>                               

 

             Creatinine Serum Mass/Vol <pending>                               

 

             Glucose Serum <pending>                               

 

             Alkaline Phosphatase <pending>                               

 

             Potassium    <pending>                               

 

             Protein Total <pending>                               

 

             Sodium       <pending>                               

 

             Ast - Sgot   <pending>                               

 

             BUN - Urea Nitrogen <pending>                               

 

                    CBC W/Auto Differential 10/18/2021          [facility]

             White Blood Count Ser Auto CNT <pending>                           

    

 

             RBC Red Blood Count <pending>                               

 

             Hemoglobin Blood <pending>                               

 

             Hematocrit   <pending>                               

 

             MCV (Corpuscular Volume) <pending>                               

 

             MCH (Corpuscular Hemoglobin) <pending>                             

  

 

             MCHC (Corpuscular Hemog Conc) <pending>                            

   

 

             RDW          <pending>                               

 

             Platelet Count Blood Auto CNT <pending>                            

   

 

             MPV          <pending>                               

 

             Neutrophils  <pending>                               

 

             Fluid Bands  <pending>                               

 

             Fluid Lymphocytes <pending>                               

 

             Monocytes    <pending>                               

 

             Fluid Body Eosinophils <pending>                               

 

             Basophils %  <pending>                               

 

             Absolute Basophils <pending>                               

 

             Absolute Eosinophils <pending>                               

 

             Absolute Lymphocytes <pending>                               

 

             Absolute Monocytes <pending>                               

 

             Absolute Neutrophils Auto CNT <pending>                            

   

 

                    Laboratory test finding 10/18/2021          [facility]

[facility]

             CRP (High Sensitivity) <pending>                               







                          1                         Units are mL/min/1.73 m2



Chronic Kidney Disease Staging per NKF:



Stage I & II   GFR >=60       Normal to Mildly Decreased

Stage III      GFR 30-59      Moderately Decreased

Stage IV       GFR 15-29      Severely Decreased

Stage V        GFR <15        Very Little GFR Left

ESRD           GFR <15 on RRT









Procedures





                Date            Code            Description     Status

 

                10/15/2021      23197           Office/Outpatient Established Lo

w MDM 20-29 Min Completed

 

                2021      30443           Office/Outpatient Established Mo

d MDM 30-39 Min Completed

 

                2021      47725           Office/Outpatient Established Lo

w MDM 20-29 Min Completed

 

                2021      68918           Office/Outpatient Established Lo

w MDM 20-29 Min Completed

 

                2020      [id number]        Mammogram       Completed

 

                2019      [id number]        Mammogram       Completed







[employer]





                                        Description

 

                                        No Information Available







Encounters





           Type       Date       Location   Provider   Dx         Diagnosis

 

                Office Visit    10/15/2021  2:00p [facility] ELMER Kan               R42                       Dizziness and giddiness

 

                          R35.0                     Frequency of micturition

 

                Office Visit    2021 10:00a [facility] ELMER Kan               F33.0                     Major depressive disorder, r

ecurrent, mild

 

                          E78.2                     Mixed hyperlipidemia

 

                          K21.9                     Gastro-esophageal reflux dis

ease without esophagitis

 

                          F03.90                    Unspecified dementia without

 behavioral disturbance

 

                          Z87.11                    Personal history of peptic u

lcer disease

 

                          Z79.899                   Other long term (current) dr roberts therapy

 

                Office Visit    2021  8:30a [facility] ELMER Kan               Z48.02                    Encounter for removal of sut

ures

 

                          W10.8xxA                  Fall (on) (from) other stair

s and steps, initial encounter

 

                          S00.83xA                  Contusion of other part of h

ead, initial encounter

 

                          Z23                       Encounter for immunization

 

                Office Visit    2021  1:30p [facility] ELMER Kan               S01.81xA                  Laceration w/o foreign body 

of oth part of head, init 

encntr

 

                          W10.8xxA                  Fall (on) (from) other stair

s and steps, initial encounter

 

                          S00.83xA                  Contusion of other part of h

ead, initial encounter







Assessments





                Date            Code            Description     Provider

 

                10/15/2021      R42             Dizziness and giddiness ELMER Kan

 

                10/15/2021      R35.0           Frequency of micturition ELMER Kan

 

                2021      F33.0           Major depressive disorder, recur

rent, mild ELMER Kan

 

                2021      E78.2           Mixed hyperlipidemia ELMER Ramos

 

                2021      K21.9           Gastro-esophageal reflux disease

 without esophagitis ELMER Kan

 

                2021      F03.90          Unspecified dementia without beh

avioral disturbance ELMER Kan

 

                2021      Z87.11          Personal history of peptic ulcer

 disease ELMER Kan

 

                2021      Z79.899         Other long term (current) drug t

herapy ELMER Kan

 

                2021      Z48.02          Encounter for removal of sutures

 ELMER Kan

 

                2021      W10.8xxA        Fall (on) (from) other stairs an

d steps, initial encounter 

ELMER Kan

 

                2021      S00.83xA        Contusion of other part of head,

 initial encounter ELMER Kan

 

                2021      Z23             Encounter for immunization ELMER Soliman

 

                    2021          S01.81xA            Laceration without f

oreign body of other part of head, 

initial encounter                       ELMER Kan

 

                2021      W10.8xxA        Fall (on) (from) other stairs an

d steps, initial encounter 

ELMER Kan

 

                2021      S00.83xA        Contusion of other part of head,

 initial encounter ELMER Kan







Plan of Treatment

Future Appointment(s):* 2022 10:00 am - ELMER Kan at [facility]

10/15/2021 - ELMER Kan* R42 Dizziness and giddiness* Comments:* Please
  make sure your drink more fluids and increase your salt intake.





* R35.0 Frequency of micturition* New Medication:* Bactrim -160 mg - take 
  one tablet by mouth every 12 hours for ten days



* Comments:* increased urinary frequency, confusion, headache and will treat 
  presumptively for a UTI









Functional Status





                                        Description

 

                                        No Information Available







Mental Status





                                        Description

 

                                        No Information Available







Referrals





                                        Description

 

                                        No Information Available

## 2021-10-21 NOTE — REPVR
Called patient’s pharmacy to check the status of the paperwork that was faxed. Technician stated that they have received the papers but they have not had time to call the STAT PA line as of yet. Was told to call back later today. We will update provider at that time. Thanks.   PROCEDURE INFORMATION: 

Exam: CT Maxillofacial Without Contrast 

Exam date and time: 10/21/2021 11:57 AM 

Age: 86 years old 

Clinical indication: Injury or trauma; Fall; Blunt trauma (contusions or 

hematomas); Forehead and orbit/periorbital and maxilla; Bilateral; Additional 

info: Fall, head injury 



TECHNIQUE: 

Imaging protocol: Computed tomography images of the face without contrast. 

Radiation optimization: All CT scans at this facility use at least one of these 

dose optimization techniques: automated exposure control; mA and/or kV 

adjustment per patient size (includes targeted exams where dose is matched to 

clinical indication); or iterative reconstruction. 



COMPARISON: 

CT Maxilofacial w/out contrast 6/5/2021 6:22 PM 



FINDINGS: 

Orbital cavity: Prior lens replacements. The orbits are intact. 

Bones/joints: Mild bony demineralization without acute fracture. Degenerative 

changes along the cervical spine. The nasal septum is deviated to the left. 

Paranasal sinuses: Mild mucosal thickening in the maxillary sinuses without 

air-fluid levels. Very limited ethmoid disease. 

Mastoid air cells: The mastoids are well aerated. 

Soft tissues: See "Dental" finding. 

Vasculature: Diffuse involutional changes in the visualized brain with vascular 

calcifications. Carotid vascular calcifications which can be followed with 

ultrasound. 



Dental: Artifact from patient's dental hardware. Asymmetric density along the 

right side of the floor of mouth anteriorly might be related to a dental 

appliances. Correlation with physical exam is recommended. This is new from the 

prior. This hyperdense region measures about 16 mm. 



IMPRESSION: 

1. No acute fracture.

2. No significant subcutaneous hematoma.

3. Carotid vascular calcifications can be followed with ultrasound.

4. Abnormality in the floor mild on the right as above.  Follow-up recommended.

5. Limited sinus disease without air-fluid levels.



Electronically signed by: Hamilton Thomas On 10/21/2021  12:30:49 PM

## 2021-10-21 NOTE — CCD
Full Chart - Jax Schrader MD [facility]

                             Created on: 2021



Helder Elvia MAGALI

External Reference #: [id number]

: 1935

Sex: Female



Demographics





                          Address                   [id number] [place] [address] 

[place]  [address]

 

                          Home Phone                [phone] 

 

                          Preferred Language        Unknown

 

                          Marital Status            

 

                          Christianity Affiliation     Unknown

 

                          Race                      White

 

                          Ethnic Group              Unknown





Author





                          Author                    Jax Schrader MD [facility]

 

                          Organization              Jax Schrader MD [facility]

 

                          Address                   [address]  [address]



 

                          Phone                     [phone] 







Support





                Name            Relationship    Address         Phone

 

                    Anthony Pendleton  ECON                [address] [place] [address] 

[place]  [address]                    [phone] 

 

                    Anthony Pendleton  Next Of Kin          [facility] 

[place]  [address]                    [phone] 







Care Team Providers





                    Care Team Member Name Role                Phone

 

                    Brooklyn Flores D.O. PP                  [phone]

[phone]

 

                    Zack WILEY, Wallace Unavailable         [phone] 







Reason for Referral

No Reason for Referral Recorded



Problems





Includes: Active, inactive, and resolved Problems



             All Visits   Onset Date - Time Resolved Date - Time Provider     Co

ndition Status

 

             Pseudophakia 2020 - 12:00AM              Wallace Dixon DO

 Active

 

                Cataract Senile Nuclear 2020 - 12:00AM 10/02/2020 - 12:43P

M Wallace Dixon DO                            Resolved

 

             Dry Eye Syndrome 2020 - 12:00AM              Wallace james DO Active

 

             Vitreous Disorders Degeneration 2020 - 12:00AM              TABITHA Dixon DO 

Active







Plan of Treatment





             Future Appointments Date         Time         Location     Provider

 

             1 Year Follow-Up 2021   12:30PM      Jax Schrader MD [facility]

 Wallace Dixon DO

 

             1 Year Follow-Up 2022   9:20AM       Jax Schrader MD [facility]

 Wallace Dixon DO







Assessments





Includes: Assessments for all patient encounters



                    Findings            Encounter           Date

 

                    Pseudophakia        3 - 4 WK Post CAT SX with Wallace zapata DO 2020

 

                    Pseudophakia        1 Week Post OP with Wallace Dixon DO

 10/02/2020

 

                    Nuclear senile cataract POST OP VISIT WITH PRE-OP with Marce Dixon DO 

2020

 

                    Pseudophakia        POST OP VISIT WITH PRE-OP with Wallace DEB

parismireya DO 2020

 

                    Nuclear senile cataract 1 WK PREOP FOR SURGERY with Wallace Dixon DO 

09/10/2020

 

                    Dry eye syndrome    Cataract Evaluation with Wallace rodgers DO 2020

 

                    Nuclear senile cataract Cataract Evaluation with Wallace randlemireya DO 2020

 

                    Vitreous degeneration Cataract Evaluation with Wallace Uli gomes DO 2020

 

                    [place]              OCT RETINA Technical Component with Edwin Schrader MD, FACS 

2016







Instructions





Instructions not supported for this document typeNo Instructions Recorded



Medical Equipment - Implanted Devices





Includes: Current and historical DevicesNo Medical Equipment Recorded



Medications





Includes: Current and historical Medications



                    Current Medications (continue as prescribed)                

      

 

                                          







                    BromSite 0.075% Ophthalmic Solution 09/10/2020          Prov

ider: 



                                         Wallace Dixon DO

 

                                        Diagnosis:          Age-related nuclear 

cataract, left eye

 

                                        Three days prior to surgery start one dr

op two times a day in the left eye, RUN 

CARD                                                 

 

                                          







                    Inveltys 1% Ophthalmic Suspension 09/10/2020          Provid

er: 



                                         Wallace Dixon DO

 

                                        Diagnosis:          Age-related nuclear 

cataract, left eye

 

                                        Day of surgery remove patch start one dr

op two times a day in the left eye, RUN 

CARD                                                 

 

                                          







                    Citalopram 10MG Oral Tablet 2020          Provider: 

 

                                        Diagnosis:           

 

                                                             

 

                                          







                    Vitamin B12 1000 MCG Oral Tablet 2020          Provide

r: 

 

                                        Diagnosis:           

 

                                                             

 

                                          







                    Vitamin D3 50 MCG (2000 UT) Oral Tablet 2020          

Provider: 

 

                                        Diagnosis:           

 

                                                             

 

                                          







                    Omeprazole 40 MG Oral Capsule Delayed Release 2020    

      Provider: 

 

                                        Diagnosis:           

 

                                                             

 

                                          







                    Memantine HCl 10 MG Oral Tablet 2020          Provider

: 

 

                                        Diagnosis:           

 

                                                             

 

                                          







                    Atorvastatin Calcium 10 MG Oral Tablet 2020          P

rovider: 

 

                                        Diagnosis:           

 

                                                             

 

                                          







                    Tylenol PM Extra Strength 500-25 MG Oral Tablet 2020  

        Provider: 

 

                                        Diagnosis:           

 

                                                             

 

                                          







                    Past Medications on file                      

 

                                          







                    Besivance 0.6% Ophthalmic Suspension 09/10/2020 - 10/02/2020

 Provider: 



                                         Wallace Dixon DO

 

                                        Diagnosis:          Age-related nuclear 

cataract, left eye

 

                    Three days prior to surgery start one drop three times a day

 in the left eye                      



 

                                          







                    Omeprazole 20 MG Oral Tablet Delayed Release 2020 -  Provider: 

 

                                        Diagnosis:           

 

                                                             

 

                                          







Medications Administered





Includes: Administered Medications in patient's chartNo Administered Medications
Recorded



Vital Signs





Includes: Vital Signs from 2020 through 2021No Vital Signs Recorded 
For Specified Dates



Results





Includes: Results from 2020 through 2021No Results Recorded For 
Specified Dates



History of Present Illness





History of Present Illness not supported for this document typeNo History of 
Present Illness Recorded



Social History





                          Description               Last Updated

 

                          Tobacco non-user          2021

 

                          No tobacco use            2020

 

                          Not using alcohol         2020

 

                          Not using drugs           2020

 

                          No smoking status : Never smoked/ Recode: 4 2020







Procedures and Surgical History





Includes: Procedures from 2020 through 2021



           Procedures Code       Diagnosis  Performing Provider Service Location

 Service Date

 

                                        Extracapsular cataract removal with intr

aocular lens implant (Right Side, 

Related procedure/service by same physician during Post Op) 26095               

      Age-related 

nuclear cataract, right eye Wallace Dixon DO [facility] 

2020

 

                                        Ophthalmic biometry - IOL Master with IO

L calculation (Professional Comp., Right

Side)           27202           Age-related nuclear cataract, right eye Wallace Villafuerte MD [facility]                      2020

 

                    Extracapsular cataract removal with intraocular lens implant

 (Left side) 18895               

Age-related nuclear cataract, left eye Wallace Dixon DO      [facility]                                  2020

 

                    Intermediate Eye Exam Established Patient (Signi/Sep Eval. &

 Man.) 23800               Age-

related nuclear cataract, left eye Wallace Villafuerte MD

 [facility]

                                        09/10/2020

 

                                        Ophthalmic biometry - IOL Master with IO

L calculation (WAIVER OF LIABILITY ON 

FILE (ABN), Left side) 14491               Age-related nuclear cataract, left ey

e Wallace Villafuerte MD [facility]  09/10/2020







                          Surgical History          Last Updated

 

                                        History of extracapsular cataract extrac

tion  PCIOL OS by Dr. Dixon 

2020 ~PCIOL OD by Dr. Dixon 2021

 

                          No surgical / procedural history 2020







Medical History





Includes: Medical History in patient's chart



                          Description               Last Updated

 

                          No recent change in medical history 2021

 

                          History of arthritis      2020

 

                          History of hyperlipidemia 2020

 

                          Reported medical history  Ulcer, Memory loss 

0







Family History





Includes: Family History in patient's chart



                          Description               Last Updated

 

                          Daughter's history of diabetes mellitus 2020

 

                          Paternal history of cataract 2020

 

                          Son's history of diabetes mellitus 2020

 

                          Sororal history of diabetes mellitus 2020







Review of Systems





Review of Systems not supported for this document typeNo Review of Systems 
Recorded



Mental Status





Mental Status not supported for this document type



                                        Description

 

                                        Oriented to time, place, and person

 

                                        Anxiety







Functional Status





Functional Status not supported for this document typeNo Functional Status 
Recorded



Physical Exam





Physical Exam not supported for this document typeNo Physical Exam Recorded



Immunizations





Includes: Immunizations in patient's chartNo Immunizations Recorded



Allergies





Includes: Active, inactive, and resolved AllergiesNo Known Allergies



Encounters





Includes: Encounters from 2020 through 2021



          Encounter Provider  Location  Date      Check-In Time Check-Out Time D

iagnosis

 

             TRIAGE NON URGENT Wallace Villafuerte MD [facility] 2021   

1:58PM                    3:54PM                     

 

             3 - 4 WK Post CAT SX Wallace Villafuerte MD [facility] 

2020   

2:07PM                    4:00PM                    Pseudophakia

 

           1 Week Post OP Wallace Villafuerte MD [facility] 10/02/20

20 12:17PM    

1:27PM                                  Pseudophakia

 

                    Extracapsular cataract removal w/IOL implant Wallace Hoover

in [facility]  2020 8:00AM 6:21AM           

 

                POST OP VISIT WITH PRE-OP Wallace Villafuerte MD [facility] 

2020          12:45PM             1:42PM              Pseudophakia, Catara

ct Senile Nuclear

 

                    Extracapsular cataract removal w/IOL implant Wallace Hoover

in [facility]  2020      6:02AM          6:04AM           

 

             1 WK PREOP FOR SURGERY Wallace Villafuerte MD [facility] 09/10/2020   

11:59AM                   12:58PM                   Cataract Senile Nuclear







Insurance





Includes: Active Insurance Policies



           Plan Name  Member ID  Group #    Subscriber Relationship Effective Da

shannan

 

           1 - [employer] [id number]              Elvia Devi

f        







Advance Directives





Includes: Current Advance DirectivesNo Advance Directives Recorded



Health Concerns





Includes: Active Health ConcernsNo Active Health Concerns Recorded



Goals





Includes: Active GoalsNo Active Goals Recorded



Interventions





Includes: Interventions for active GoalsNo Interventions Recorded



Evaluations & Outcomes





Includes: Evaluations & Outcomes for active GoalsNo Outcomes Recorded

## 2021-10-21 NOTE — CCD
Full Chart - Jax Schrader MD [facility]

                             Created on: 2021



Helder Elvia MAGALI

External Reference #: [id number]

: 1935

Sex: Female



Demographics





                          Address                   [address] [place] [address] 

[place]  [address]

 

                          Home Phone                [phone] 

 

                          Preferred Language        Unknown

 

                          Marital Status            

 

                          Temple Affiliation     Unknown

 

                          Race                      White

 

                          Ethnic Group              Unknown





Author





                          Author                    Jax Schrader MD [facility]

 

                          Organization              Jxa Schrader MD [facility]

 

                          Address                   [address]  [address]



 

                          Phone                     [phone] 







Support





                Name            Relationship    Address         Phone

 

                    Anthony Pendleton  ECON                [id number] [place] [address] 

[place]  [address]                    [phone] 

 

                    Anthony Pendleton  Next Of Kin          [facility] 

[place]  [address]                    [phone] 







Care Team Providers





                    Care Team Member Name Role                Phone

 

                    Brooklyn Flores D.O. PP                  [phone]

[phone]

 

                    Zack WILEY, Wallace Unavailable         [phone] 







Reason for Referral

No Reason for Referral Recorded



Problems





Includes: Active, inactive, and resolved Problems



             All Visits   Onset Date - Time Resolved Date - Time Provider     Co

ndition Status

 

             Pseudophakia 2020 - 12:00AM              Wallace Dixon DO

 Active

 

                Cataract Senile Nuclear 2020 - 12:00AM 10/02/2020 - 12:43P

M Wallace Dixon DO                            Resolved

 

             Dry Eye Syndrome 2020 - 12:00AM              Wallace james DO Active

 

             Vitreous Disorders Degeneration 2020 - 12:00AM              TABITHA Dixon DO 

Active







Plan of Treatment





             Future Appointments Date         Time         Location     Provider

 

             1 Year Follow-Up 2021   12:30PM      Jax Schrader MD [facility]

 Wallace Dixon DO

 

             1 Year Follow-Up 2022   9:20AM       Jax Schrader MD [facility]

 Wallace Dixon DO







Assessments





Includes: Assessments for all patient encounters



                    Findings            Encounter           Date

 

                    Pseudophakia        3 - 4 WK Post CAT SX with Wallace zapata DO 2020

 

                    Pseudophakia        1 Week Post OP with Wallace Dixon DO

 10/02/2020

 

                    Nuclear senile cataract POST OP VISIT WITH PRE-OP with Marce Dixon DO 

2020

 

                    Pseudophakia        POST OP VISIT WITH PRE-OP with Wallace DEB

parismireya DO 2020

 

                    Nuclear senile cataract 1 WK PREOP FOR SURGERY with Wallace Dixon DO 

09/10/2020

 

                    Dry eye syndrome    Cataract Evaluation with Wallace rodgers DO 2020

 

                    Nuclear senile cataract Cataract Evaluation with Wallace randlemireya DO 2020

 

                    Vitreous degeneration Cataract Evaluation with Wallace Uli gomes DO 2020

 

                    [place]              OCT RETINA Technical Component with Edwin Schrader MD, FACS 

2016







Instructions





Instructions not supported for this document typeNo Instructions Recorded



Medical Equipment - Implanted Devices





Includes: Current and historical DevicesNo Medical Equipment Recorded



Medications





Includes: Current and historical Medications



                    Current Medications (continue as prescribed)                

      

 

                                          







                    BromSite 0.075% Ophthalmic Solution 09/10/2020          Prov

ider: 



                                         Wallace Dixon DO

 

                                        Diagnosis:          Age-related nuclear 

cataract, left eye

 

                                        Three days prior to surgery start one dr

op two times a day in the left eye, RUN 

CARD                                                 

 

                                          







                    Inveltys 1% Ophthalmic Suspension 09/10/2020          Provid

er: 



                                         Wallace Dixon DO

 

                                        Diagnosis:          Age-related nuclear 

cataract, left eye

 

                                        Day of surgery remove patch start one dr

op two times a day in the left eye, RUN 

CARD                                                 

 

                                          







                    Citalopram 10MG Oral Tablet 2020          Provider: 

 

                                        Diagnosis:           

 

                                                             

 

                                          







                    Vitamin B12 1000 MCG Oral Tablet 2020          Provide

r: 

 

                                        Diagnosis:           

 

                                                             

 

                                          







                    Vitamin D3 50 MCG (2000 UT) Oral Tablet 2020          

Provider: 

 

                                        Diagnosis:           

 

                                                             

 

                                          







                    Omeprazole 40 MG Oral Capsule Delayed Release 2020    

      Provider: 

 

                                        Diagnosis:           

 

                                                             

 

                                          







                    Memantine HCl 10 MG Oral Tablet 2020          Provider

: 

 

                                        Diagnosis:           

 

                                                             

 

                                          







                    Atorvastatin Calcium 10 MG Oral Tablet 2020          P

rovider: 

 

                                        Diagnosis:           

 

                                                             

 

                                          







                    Tylenol PM Extra Strength 500-25 MG Oral Tablet 2020  

        Provider: 

 

                                        Diagnosis:           

 

                                                             

 

                                          







                    Past Medications on file                      

 

                                          







                    Besivance 0.6% Ophthalmic Suspension 09/10/2020 - 10/02/2020

 Provider: 



                                         Wallace Dixon DO

 

                                        Diagnosis:          Age-related nuclear 

cataract, left eye

 

                    Three days prior to surgery start one drop three times a day

 in the left eye                      



 

                                          







                    Omeprazole 20 MG Oral Tablet Delayed Release 2020 -  Provider: 

 

                                        Diagnosis:           

 

                                                             

 

                                          







Medications Administered





Includes: Administered Medications in patient's chartNo Administered Medications
Recorded



Vital Signs





Includes: Vital Signs from 2020 through 2021No Vital Signs Recorded 
For Specified Dates



Results





Includes: Results from 2020 through 2021No Results Recorded For 
Specified Dates



History of Present Illness





History of Present Illness not supported for this document typeNo History of 
Present Illness Recorded



Social History





                          Description               Last Updated

 

                          Tobacco non-user          2021

 

                          No smoking status : Never smoked/ Recode: 4 2020

 

                          No tobacco use            2020

 

                          Not using alcohol         2020

 

                          Not using drugs           2020







Procedures and Surgical History





Includes: Procedures from 2020 through 2021



           Procedures Code       Diagnosis  Performing Provider Service Location

 Service Date

 

                                        Extracapsular cataract removal with intr

aocular lens implant (Right Side, 

Related procedure/service by same physician during Post Op) 63673               

      Age-related 

nuclear cataract, right eye Wallace Dixon DO [facility] 

2020

 

                                        Ophthalmic biometry - IOL Master with IO

L calculation (Professional Comp., Right

Side)           93103           Age-related nuclear cataract, right eye Wallace Villafuerte MD [facility]                      2020

 

                    Extracapsular cataract removal with intraocular lens implant

 (Left side) 62310               

Age-related nuclear cataract, left eye Wallace Dixon DO      [facility]                                  2020

 

                    Intermediate Eye Exam Established Patient (Signi/Sep Eval. &

 Man.) 53497               Age-

related nuclear cataract, left eye Wallace Villafuerte MD

 [facility]

                                        09/10/2020

 

                                        Ophthalmic biometry - IOL Master with IO

L calculation (WAIVER OF LIABILITY ON 

FILE (ABN), Left side) [id number]               Age-related nuclear cataract, left ey

e Wallace Villafuerte MD [facility]  09/10/2020







                          Surgical History          Last Updated

 

                                        History of extracapsular cataract extrac

tion  PCIOL OS by Dr. Dixon 

2020 ~PCIOL OD by Dr. Dixon 2020

 

                          Surgical / procedural history 10/02/2020







Medical History





Includes: Medical History in patient's chart



                          Description               Last Updated

 

                          Recent change in medical history  Cataract Sx OD by Dr Unique Dixon 2020 

10/02/2020

 

                          History of arthritis      2020

 

                          History of hyperlipidemia 2020

 

                          Reported medical history  Ulcer, Memory loss 

0







Family History





Includes: Family History in patient's chart



                          Description               Last Updated

 

                          Daughter's history of diabetes mellitus 2020

 

                          Paternal history of cataract 2020

 

                          Son's history of diabetes mellitus 2020

 

                          Sororal history of diabetes mellitus 2020







Review of Systems





Review of Systems not supported for this document typeNo Review of Systems 
Recorded



Mental Status





Mental Status not supported for this document type



                                        Description

 

                                        Oriented to time, place, and person

 

                                        Anxiety







Functional Status





Functional Status not supported for this document typeNo Functional Status 
Recorded



Physical Exam





Physical Exam not supported for this document typeNo Physical Exam Recorded



Immunizations





Includes: Immunizations in patient's chartNo Immunizations Recorded



Allergies





Includes: Active, inactive, and resolved AllergiesNo Known Allergies



Encounters





Includes: Encounters from 2020 through 2021



          Encounter Provider  Location  Date      Check-In Time Check-Out Time D

iagnosis

 

             TRIAGE NON URGENT Wallace Villafuerte MD [facility] 2021   

1:58PM                    3:54PM                     

 

             3 - 4 WK Post CAT SX Wallace Villafuerte MD [facility] 

2020   

2:07PM                    4:00PM                    Pseudophakia

 

           1 Week Post OP Wallace Villafuerte MD [facility] 10/02/20

20 12:17PM    

1:27PM                                  Pseudophakia

 

                    Extracapsular cataract removal w/IOL implant Wallace Hoover

in [facility]  2020 8:00AM 6:21AM           

 

                POST OP VISIT WITH PRE-OP Wallace Villafuerte MD [facility] 

2020          12:45PM             1:42PM              Pseudophakia, Catara

ct Senile Nuclear

 

                    Extracapsular cataract removal w/IOL implant Wallace Hoover

in [facility]  2020      6:02AM          6:04AM           

 

             1 WK PREOP FOR SURGERY Wallace Villafuerte MD [facility] 09/10/2020   

11:59AM                   12:58PM                   Cataract Senile Nuclear







Insurance





Includes: Active Insurance Policies



           Plan Name  Member ID  Group #    Subscriber Relationship Effective Da

shannan

 

           1 - [employer] [id number]              [place] Shandra

f        







Advance Directives





Includes: Current Advance DirectivesNo Advance Directives Recorded



Health Concerns





Includes: Active Health ConcernsNo Active Health Concerns Recorded



Goals





Includes: Active GoalsNo Active Goals Recorded



Interventions





Includes: Interventions for active GoalsNo Interventions Recorded



Evaluations & Outcomes





Includes: Evaluations & Outcomes for active GoalsNo Outcomes Recorded

## 2021-10-21 NOTE — REPVR
PROCEDURE INFORMATION: 

Exam: CT Head Without Contrast 

Exam date and time: 10/21/2021 11:57 AM 

Age: 86 years old 

Clinical indication: Injury or trauma; Fall; Blunt trauma (contusions or 

hematomas) 



TECHNIQUE: 

Imaging protocol: Computed tomography of the head without contrast. 

Radiation optimization: All CT scans at this facility use at least one of these 

dose optimization techniques: automated exposure control; mA and/or kV 

adjustment per patient size (includes targeted exams where dose is matched to 

clinical indication); or iterative reconstruction. 



COMPARISON: 

CT Head without contrast 6/5/2021 6:22 PM 



FINDINGS: 

Brain: There is no evidence of acute hemorrhage or acute territorial infarct. 

Moderate diffuse involutional changes are present in the brain with moderate 

white matter hypodensity suggest small vessel disease. Vascular calcifications 

at the [id number]. 

Cerebral ventricles: No ventriculomegaly. 

Paranasal sinuses: Mild paranasal sinus disease without air-fluid levels. 

Mastoid air cells: The mastoids are well aerated. 

Bones/joints: Unremarkable. No acute fracture. 

Soft tissues: Unremarkable. 



IMPRESSION: 

No acute intracranial abnormality and no significant change from the previous.



Electronically signed by: Hamilton Thomas On 10/21/2021  12:22:15 PM

## 2021-10-21 NOTE — CCD
Continuity of Care Document (CCD)

                             Created on: 2021



Elvia Pendleton

External Reference #: [id number]

: 1935

Sex: Female



Demographics





                          Address                   [address] [place]  [address]

 

                          Home Phone                [phone]

 

                          Preferred Language        Unknown

 

                          Marital Status            Unknown

 

                          Jainism Affiliation     Unknown

 

                          Race                      White

 

                          Ethnic Group              Not  or 





Author





                          Author                    Elvia TEJADA MD

 

                          Organization              Unknown

 

                          Address                   [address] [place] [address], it

[address]

[place]  [address]



 

                          Phone                     [phone]







Care Team Providers





                    Care Team Member Name Role                Phone

 

                    Brooklyn FloresM                [phone]

[phone]







Problems





                    Active Problems     Provider            Date

 

                    Pure hypercholesterolemia Saúl Tejada MD  Onset: 

020







Social History





                Type            Date            Description     Comments

 

                Birth Sex                       Unknown          

 

                ETOH Use                        Denies alcohol use  

 

                Tobacco Use     Start: Unknown End: Unknown Patient is a former 

smoker  







Allergies, Adverse Reactions, Alerts





             Active Allergies Criticality  Reaction | Severity Comments     Date

 

             Iodine       Unable to assess criticality                          

 2020







Medications





           Active Medications SIG        Qnty       Indications Ordering Provide

r Date

 

           Memantine HCL                     5mg Tablets                        

                            Unknown    



 

                Citalopram Hydrobromide                     10mg Tablets        

                                            

                          Unknown                   

 

             Atorvastatin Calcium                     20mg Tablets              

                                            

Unknown                                 

 

           Vitamin E                     100Unit Capsules                       

                             Unknown    



 

                                        Acetaminophen Extra Strength            

         500mg Tablets                  

             2 by mouth three times a day for pain as needed                    

       Unknown      







Immunizations





                                        Description

 

                                        No Information Available







Vital Signs





                Date            Vital           Result          Comment

 

                2020  9:46am Body Temperature 97.3 F         

 

                    Height              63 inches           5'3"

 

                    Weight              135.00 lb            

 

                    BMI (Body Mass Index) 23.9 kg/m2           

 

                2020  2:43pm Body Temperature 97.3 F         

 

                    Height              63 inches           5'3"

 

                    Weight              135.00 lb            

 

                    BMI (Body Mass Index) 23.9 kg/m2           







Results





                                        Description

 

                                        No Information Available







Procedures





                Date            Code            Description     Status

 

                2021      21906           Office/Outpatient Established Mo

d MDM 30-39 Min Completed

 

                2021      10398           Inject/Drain Joint/Bursa Major C

ompleted







Medical Devices





                                        Description

 

                                        No Information Available







Encounters





           Type       Date       Location   Provider   Dx         Diagnosis

 

           Office Visit 2021  2:45p [place]  Saúl Tejada MD M16.12  

   Unilateral 

primary osteoarthritis, left hip

 

                          M70.62                    Trochanteric bursitis, left 

hip







Assessments





                Date            Code            Description     Provider

 

                2021      M16.12          Unilateral primary osteoarthriti

s, left hip Saúl Tejada MD

 

                2021      M70.62          Trochanteric bursitis, left hip 

Saúl Tejada MD







Plan of Treatment

2021 - Saúl Tejada MD* M16.12 Unilateral primary osteoarthritis, left 
  hip* Follow up:* prn





* M70.62 Trochanteric bursitis, left hip





Functional Status





                                        Description

 

                                        No Information Available







Mental Status





                                        Description

 

                                        No Information Available







Referrals





                                        Description

 

                                        No Information Available

## 2021-10-21 NOTE — CCD
Continuity of Care Document (CCD)

                             Created on: 10/15/2021



Brenham, Mona Lilian

External Reference #: [id number]

: 1935

Sex: Female



Demographics





                          Address                   [address] [place]  [address]

 

                          Home Phone                [phone]

 

                          Preferred Language        Unknown

 

                          Marital Status            

 

                          Anabaptist Affiliation     Unknown

 

                          Race                      White

 

                          Ethnic Group              Declined to Specify/Unknown





Author





                          Author                    Elvia TOLENTINO

 

                          Organization              Unknown

 

                          Address                    [place]  [address]



 

                          Phone                     [phone]







Care Team Providers





                    Care Team Member Name Role                Phone

 

                    Brooklyn Flores D.O. AUTM                [phone]

 

                    KALYAN Bartholomew M.D. AUTM                [phone]

 

                    [facility]                [phone]

 

                    Jax Schrader MD AUTM                [phone]

 

                    Omer Gutierrez M.D. AUTM                [phone]







Problems





                    Active Problems     Provider            Date

 

                    H/O: peptic ulcer   ELEMR Kan Onset: 2019

 

                    Mixed hyperlipidemia EL[facility] Kan Onset: 2019

 

                    Mild recurrent major depression ELMER Kan Onset: 0

2019

 

                    Dementia            ELMER Kan Onset: 10/10/2019

 

                    Gastroesophageal reflux disease ELMER Kan Onset: 1

0/10/2019







Social History





                Type            Date            Description     Comments

 

                Birth Sex                       Unknown          

 

                ETOH Use                        Denies alcohol use  

 

                Tobacco Use     Start: Unknown  Patient has never smoked  

 

                Recreational Drug Use                 Denies Drug Use  

 

                Smoking Status  Reviewed: 21 Patient has never smoked  

 

                Exercise Type/Frequency                 Does not exercise  

 

                Sun Exposure                    Does not use sunscreen  

 

                Seat Belt/Car Seat                 Always uses seat belt  







Allergies and adverse reactions





             Active Allergies Criticality  Reaction | Severity Comments     Date

 

             Iodine       Unable to assess criticality                          

 2018







Medications





           Active Medications SIG        Qnty       Indications Ordering Provide

r Date

 

                          Bactrim DS                     800-160mg Tablets      

             take one 

tablet by mouth every 12 hours for ten days 20tabs              R35.0           

    PETRONA Palacios.O.                                    10/15/2021

 

                                        Calcium 600+D High Potency              

       600-400mg-Unit Tablets           

             1 by mouth twice a day 180tabs                   Brooklyn sherman, D.O. 2021

 

                          Prevagen Extra Strength                     20mg Capsu

les                   take

one capsule by mouth daily. 90caps                          PETRONA Palacios.O. 2021

 

                          Memantine HCL                     10mg Tablets        

           Take One Tablet

By Mouth Twice A Day 180tabs         F03.90          PETRONA Palacios.O. 

10/07/2020

 

                                        Fluticasone Propionate Nasal Spray      

               50mcg/Act Suspension     

                two sprays each nostril once daily 29.7ml          J06.9        

   PETRONA Yee.O.                            2020

 

                          Omeprazole                     40mg Capsules DR       

            take one 

capsule by mouth every day 90caps                          PETRONA Palacios.O. 2019

 

                          Diclofenac Sodium                     1% Gel          

         apply 3 grams to 

painful area of left elbow twice a day as needed 100units            M79.642    

         PETRONA Yee.O.                            04/10/2019

 

                          Citalopram Hydrobromide                     10mg Table

ts                   Take 

One Tablet By Mouth Every Day 90tabs                          Brooklyn sherman, D.O. 

 

                          Vitamin D3                     2000Unit Capsules      

             1 by mouth 

every day                                       Unknown         

 

                          Vitamin B-12                     1000mcg Tablets      

             1 by mouth 

every day                                       Unknown         

 

                          Atorvastatin Calcium                     10mg Tablets 

                  Take One

Tablet By Mouth Every Evening 90tabs                          Brooklyn sherman, D.O. 

 

                          Tylenol                     325mg Tablets             

      take 1-2 tabs daily 

as needed                                       Unknown         

 

                                        History Medications

 

                                        Tetanus/Diphtheria Toxoids-Adsorbed Adul

t                     2-2LF/0.5ML 

Suspension                   administer tetanus with pertussis at pharmacy. 5ml 

                

                          PETRONA Palacios.O. 2021 - 2021







Immunizations





             CPT Code     Status       Date         Vaccine      Lot #

 

                [id number]           Given           2021      Tetanus, Diphthe

aixa Toxoids/Acellular Pertussis Vaccine 7

Or >                                    [id number]

 

             [id number]        Given        2020   Pneumococcal Vaccine 23 [address]

nt 2Yrs Or Older  

 

                [id number]           Given           2019      Pneumococcal Con

jugate Vaccine 13 Valent For 

Intramuscular Use                        

 

             U-Flu        Given        2019   Influenza,Unspecified  







Vital Signs





                Date            Vital           Result          Comment

 

                10/15/2021  2:11pm BP Systolic     92 mmHg          

 

                    BP Diastolic        58 mmHg              

 

                    Height              61.7 inches         5'1.70"

 

                    Heart Rate          80 /min              

 

                    Respiratory Rate    12 /min              

 

                    Body Temperature    98.5 F             

 

                    O2 % BldC Oximetry  95 %                 

 

                    Ideal Body Weight   105 lb               

 

                2021 10:03am BP Systolic     124 mmHg         

 

                    BP Diastolic        74 mmHg              

 

                    Height              61.7 inches         5'1.70"

 

                    Weight              144.25 lb            

 

                    BMI (Body Mass Index) 26.6 kg/m2           

 

                    Heart Rate          74 /min              

 

                    Respiratory Rate    18 /min              

 

                    Body Temperature    97.8 F             

 

                    O2 % BldC Oximetry  95 %                 

 

                    Ideal Body Weight   105 lb               







Results





                                        Description

 

                                        No Information Available







Procedures





                Date            Code            Description     Status

 

                10/15/2021      48845           Office/Outpatient Established Lo

w MDM 20-29 Min Completed

 

                2021      21592           Office/Outpatient Established Mo

d MDM 30-39 Min Completed

 

                2021      32731           Office/Outpatient Established Lo

w MDM 20-29 Min Completed

 

                2021      22453           Office/Outpatient Established Lo

w MDM 20-29 Min Completed

 

                2020      [id number]        Mammogram       Completed

 

                2019      [id number]        Mammogram       Completed







Medical Devices





                                        Description

 

                                        No Information Available







Encounters





           Type       Date       Location   Provider   Dx         Diagnosis

 

                Office Visit    10/15/2021  2:00p [facility] ELMER Kan               R42                       Dizziness and giddiness

 

                          R35.0                     Frequency of micturition

 

                Office Visit    2021 10:00a [facility] ELMER Kan               F33.0                     Major depressive disorder, r

ecurrent, mild

 

                          E78.2                     Mixed hyperlipidemia

 

                          K21.9                     Gastro-esophageal reflux dis

ease without esophagitis

 

                          F03.90                    Unspecified dementia without

 behavioral disturbance

 

                          Z87.11                    Personal history of peptic u

lcer disease

 

                          Z79.899                   Other long term (current) dr roberts therapy

 

                Office Visit    2021  8:30a [facility] ELMER Kan               Z48.02                    Encounter for removal of sut

ures

 

                          W10.8xxA                  Fall (on) (from) other stair

s and steps, initial encounter

 

                          S00.83xA                  Contusion of other part of h

ead, initial encounter

 

                          Z23                       Encounter for immunization

 

                Office Visit    2021  1:30p [facility] ELMER Kan               S01.81xA                  Laceration w/o foreign body 

of oth part of head, init 

encntr

 

                          W10.8xxA                  Fall (on) (from) other stair

s and steps, initial encounter

 

                          S00.83xA                  Contusion of other part of h

ead, initial encounter







Assessments





                Date            Code            Description     Provider

 

                10/15/2021      R42             Dizziness and giddiness ELMER Kan

 

                10/15/2021      R35.0           Frequency of micturition ELMER Kan

 

                2021      F33.0           Major depressive disorder, recur

rent, mild ELMER Kan

 

                2021      E78.2           Mixed hyperlipidemia ELMER Ramos

 

                2021      K21.9           Gastro-esophageal reflux disease

 without esophagitis ELMER Kan

 

                2021      F03.90          Unspecified dementia without beh

avioral disturbance ELMER Kan

 

                2021      Z87.11          Personal history of peptic ulcer

 disease ELMER Kan

 

                2021      Z79.899         Other long term (current) drug t

herapy ELMER Kan

 

                2021      Z48.02          Encounter for removal of sutures

 ELMER Kan

 

                2021      W10.8xxA        Fall (on) (from) other stairs an

d steps, initial encounter 

ELMER Kan

 

                2021      S00.83xA        Contusion of other part of head,

 initial encounter ELMER Kan

 

                2021      Z23             Encounter for immunization ELMER Soliman

 

                    2021          S01.81xA            Laceration without f

oreign body of other part of head, 

initial encounter                       ELMER Kan

 

                2021      W10.8xxA        Fall (on) (from) other stairs an

d steps, initial encounter 

ELMER Kan

 

                2021      S00.83xA        Contusion of other part of head,

 initial encounter ELMER Kan







Plan of Treatment

Future Appointment(s):* 2022 10:00 am - ELMER Kan at [facility]

10/15/2021 - ELMER Kan* R42 Dizziness and giddiness* Comments:* Please
  make sure your drink more fluids and increase your salt intake.





* R35.0 Frequency of micturition* New Medication:* Bactrim -160 mg - take 
  one tablet by mouth every 12 hours for ten days



* New Labs:* Ua Routine, Scheduled: 10/15/21

* Culture Urine Routine, Scheduled: 10/15/21

* CMP, Scheduled: 10/15/21

* CBC W/Auto Differential, Scheduled: 10/15/21

* CRP (High Sensitivity), Scheduled: 10/15/21



* Comments:* increased urinary frequency, confusion, headache and will treat 
  presumptively for a UTI









Functional Status





                                        Description

 

                                        No Information Available







Mental Status





                                        Description

 

                                        No Information Available







Referrals





                                        Description

 

                                        No Information Available

## 2021-10-21 NOTE — CCD
Continuity of Care Document (CCD)

                             Created on: 10/15/2021



[place], Mona Lilian

External Reference #: [id number]

: 1935

Sex: Female



Demographics





                          Address                   [address] [place]  [address]

 

                          Home Phone                [phone]

 

                          Preferred Language        Unknown

 

                          Marital Status            

 

                          Mormonism Affiliation     Unknown

 

                          Race                      White

 

                          Ethnic Group              Declined to Specify/Unknown





Author





                          Author                    Elvia TOLENTINO

 

                          Organization              Unknown

 

                          [place]  [address]



 

                          Phone                     [phone]







Care Team Providers





                    Care Team Member Name Role                Phone

 

                    Brooklyn Flores D.O. AUTM                [phone]

 

                    KALYAN Bartholomew M.D. AUTM                [phone]

 

                    [facility] AUTM                [phone]

 

                    Jax Schrader MD AUTM                [phone]

 

                    Omer Gutierrez M.D. AUTM                [phone]







Problems





                    Active Problems     Provider            Date

 

                    H/O: peptic ulcer   ELMER Kan Onset: 2019

 

                    Mixed hyperlipidemia ELMER Kan Onset: 2019

 

                    Mild recurrent major depression ELMER Kan Onset: 0

2019

 

                    Dementia            ELMER Kan Onset: 10/10/2019

 

                    Gastroesophageal reflux disease ELMER Kan Onset: 1

0/10/2019







Social History





                Type            Date            Description     Comments

 

                Birth Sex                       Unknown          

 

                ETOH Use                        Denies alcohol use  

 

                Tobacco Use     Start: Unknown  Patient has never smoked  

 

                Recreational Drug Use                 Denies Drug Use  

 

                Smoking Status  Reviewed: 21 Patient has never smoked  

 

                Exercise Type/Frequency                 Does not exercise  

 

                Sun Exposure                    Does not use sunscreen  

 

                Seat Belt/Car Seat                 Always uses seat belt  







Allergies and adverse reactions





             Active Allergies Criticality  Reaction | Severity Comments     Date

 

             Iodine       Unable to assess criticality                          

 2018







Medications





           Active Medications SIG        Qnty       Indications Ordering Provide

r Date

 

                          Bactrim DS                     800-160mg Tablets      

             take one 

tablet by mouth every 12 hours for ten days 20tabs              R35.0           

    PETRONA Palacios.O.                                    10/15/2021

 

                                        Calcium 600+D High Potency              

       600-400mg-Unit Tablets           

             1 by mouth twice a day 180tabs                   Brooklyn sherman, D.O. 2021

 

                          Prevagen Extra Strength                     20mg Capsu

les                   take

one capsule by mouth daily. 90caps                          PETRONA Palacios.O. 2021

 

                          Memantine HCL                     10mg Tablets        

           Take One Tablet

By Mouth Twice A Day 180tabs         F03.90          PETRONA Palacios.O. 

10/07/2020

 

                                        Fluticasone Propionate Nasal Spray      

               50mcg/Act Suspension     

                two sprays each nostril once daily 29.7ml          J06.9        

   PETRONA Yee.O.                            2020

 

                          Omeprazole                     40mg Capsules DR       

            take one 

capsule by mouth every day 90caps                          PETRONA Palacios.O. 2019

 

                          Diclofenac Sodium                     1% Gel          

         apply 3 grams to 

painful area of left elbow twice a day as needed 100units            M79.642    

         PETRONA Yee.O.                            04/10/2019

 

                          Citalopram Hydrobromide                     10mg Table

ts                   Take 

One Tablet By Mouth Every Day 90tabs                          Brooklyn sherman, D.O. 

 

                          Vitamin D3                     2000Unit Capsules      

             1 by mouth 

every day                                       Unknown         

 

                          Vitamin B-12                     1000mcg Tablets      

             1 by mouth 

every day                                       Unknown         

 

                          Atorvastatin Calcium                     10mg Tablets 

                  Take One

Tablet By Mouth Every Evening 90tabs                          Brooklyn sherman, D.O. 

 

                          Tylenol                     325mg Tablets             

      take 1-2 tabs daily 

as needed                                       Unknown         

 

                                        History Medications

 

                                        Tetanus/Diphtheria Toxoids-Adsorbed Adul

t                     2-2LF/0.5ML 

Suspension                   administer tetanus with pertussis at pharmacy. 5ml 

                

                          PETRONA Palacios.O. 2021 - 2021







Immunizations





             CPT Code     Status       Date         Vaccine      Lot #

 

                [id number]           Given           2021      Tetanus, Diphthe

aixa Toxoids/Acellular Pertussis Vaccine 7

Or >                                    [id number]

 

             [id number]        Given        2020   Pneumococcal Vaccine 23 [address]

nt 2Yrs Or Older  

 

                [id number]           Given           2019      Pneumococcal Con

jugate Vaccine 13 Valent For 

Intramuscular Use                        

 

             U-Flu        Given        2019   Influenza,Unspecified  







Vital Signs





                Date            Vital           Result          Comment

 

                10/15/2021  2:11pm BP Systolic     92 mmHg          

 

                    BP Diastolic        58 mmHg              

 

                    Height              61.7 inches         5'1.70"

 

                    Heart Rate          80 /min              

 

                    Respiratory Rate    12 /min              

 

                    Body Temperature    98.5 F             

 

                    O2 % BldC Oximetry  95 %                 

 

                    Ideal Body Weight   105 lb               

 

                2021 10:03am BP Systolic     124 mmHg         

 

                    BP Diastolic        74 mmHg              

 

                    Height              61.7 inches         5'1.70"

 

                    Weight              144.25 lb            

 

                    BMI (Body Mass Index) 26.6 kg/m2           

 

                    Heart Rate          74 /min              

 

                    Respiratory Rate    18 /min              

 

                    Body Temperature    97.8 F             

 

                    O2 % BldC Oximetry  95 %                 

 

                    Ideal Body Weight   105 lb               







Results





                                        Description

 

                                        No Information Available







Procedures





                Date            Code            Description     Status

 

                10/15/2021      66989           Office/Outpatient Established Lo

w MDM 20-29 Min Completed

 

                2021      89736           Office/Outpatient Established Mo

d MDM 30-39 Min Completed

 

                2021      55072           Office/Outpatient Established Lo

w MDM 20-29 Min Completed

 

                2021      21496           Office/Outpatient Established Lo

w MDM 20-29 Min Completed

 

                2020      [id number]        Mammogram       Completed

 

                2019      [id number]        Mammogram       Completed







Medical Devices





                                        Description

 

                                        No Information Available







Encounters





           Type       Date       Location   Provider   Dx         Diagnosis

 

                Office Visit    10/15/2021  2:00p [facility] ELMER Kan               R42                       Dizziness and giddiness

 

                          R35.0                     Frequency of micturition

 

                Office Visit    2021 10:00a [facility] ELMER Kan               F33.0                     Major depressive disorder, r

ecurrent, mild

 

                          E78.2                     Mixed hyperlipidemia

 

                          K21.9                     Gastro-esophageal reflux dis

ease without esophagitis

 

                          F03.90                    Unspecified dementia without

 behavioral disturbance

 

                          Z87.11                    Personal history of peptic u

lcer disease

 

                          Z79.899                   Other long term (current) dr roberts therapy

 

                Office Visit    2021  8:30a [facility] ELMER Kan               Z48.02                    Encounter for removal of sut

ures

 

                          W10.8xxA                  Fall (on) (from) other stair

s and steps, initial encounter

 

                          S00.83xA                  Contusion of other part of h

ead, initial encounter

 

                          Z23                       Encounter for immunization

 

                Office Visit    2021  1:30p [facility] ELMER Kan               S01.81xA                  Laceration w/o foreign body 

of oth part of head, init 

encntr

 

                          W10.8xxA                  Fall (on) (from) other stair

s and steps, initial encounter

 

                          S00.83xA                  Contusion of other part of h

ead, initial encounter







Assessments





                Date            Code            Description     Provider

 

                10/15/2021      R42             Dizziness and giddiness ELMER Kan

 

                10/15/2021      R35.0           Frequency of micturition ELMER Kan

 

                2021      F33.0           Major depressive disorder, recur

rent, mild ELMER Kan

 

                2021      E78.2           Mixed hyperlipidemia ELMER Ramos

 

                2021      K21.9           Gastro-esophageal reflux disease

 without esophagitis ELMER Kan

 

                2021      F03.90          Unspecified dementia without beh

avioral disturbance ELMER Kan

 

                2021      Z87.11          Personal history of peptic ulcer

 disease ELMER Kan

 

                2021      Z79.899         Other long term (current) drug t

herapy ELMER Kan

 

                2021      Z48.02          Encounter for removal of sutures

 ELMER Kan

 

                2021      W10.8xxA        Fall (on) (from) other stairs an

d steps, initial encounter 

ELMER Kan

 

                2021      S00.83xA        Contusion of other part of head,

 initial encounter ELMER Kan

 

                2021      Z23             Encounter for immunization ELMER Soliman

 

                    2021          S01.81xA            Laceration without f

oreign body of other part of head, 

initial encounter                       ELMER Kan

 

                2021      W10.8xxA        Fall (on) (from) other stairs an

d steps, initial encounter 

ELMER Kan

 

                2021      S00.83xA        Contusion of other part of head,

 initial encounter ELMER Kan







Plan of Treatment

Future Appointment(s):* 2022 10:00 am - ELMER Kan at [facility]

10/15/2021 - ELMER Kan* R42 Dizziness and giddiness* Comments:* Please
  make sure your drink more fluids and increase your salt intake.





* R35.0 Frequency of micturition* New Medication:* Bactrim -160 mg - take 
  one tablet by mouth every 12 hours for ten days



* New Labs:* Ua Routine, Scheduled: 10/15/21

* Culture Urine Routine, Scheduled: 10/15/21

* CMP, Scheduled: 10/15/21

* CBC W/Auto Differential, Scheduled: 10/15/21

* CRP (High Sensitivity), Scheduled: 10/15/21



* Comments:* increased urinary frequency, confusion, headache and will treat 
  presumptively for a UTI









Functional Status





                                        Description

 

                                        No Information Available







Mental Status





                                        Description

 

                                        No Information Available







Referrals





                                        Description

 

                                        No Information Available

## 2021-10-21 NOTE — CCD
Full Chart - Jax Schrader MD [facility]

                             Created on: 2021



Helder Elvia MAGALI

External Reference #: [id number]

: 1935

Sex: Female



Demographics





                          Address                   [id number] [place] [address] 

[place]  [address]

 

                          Home Phone                [phone] 

 

                          Preferred Language        Unknown

 

                          Marital Status            

 

                          Buddhism Affiliation     Unknown

 

                          Race                      White

 

                          Ethnic Group              Unknown





Author





                          Author                    Jax Schrader MD [facility]

 

                          Organization              Jax Schrader MD [facility]

 

                          Address                   [address]  [address]



 

                          Phone                     [phone] 







Support





                Name            Relationship    Address         Phone

 

                    Anthony Pendleton  ECON                [id number] [place] [address] 

[place]  [address]                    [phone] 

 

                    Anthony Pendleton  Next Of Kin          [facility] 

[place]  [address]                    [phone] 







Care Team Providers





                    Care Team Member Name Role                Phone

 

                    Brooklyn Flores D.O. PP                  [phone]

[phone]

 

                    Zack WILEY, Wallace Unavailable         [phone] 







Reason for Referral

No Reason for Referral Recorded



Problems





Includes: Active, inactive, and resolved Problems



             All Visits   Onset Date - Time Resolved Date - Time Provider     Co

ndition Status

 

             Pseudophakia 2020 - 12:00AM              Wallace Dixon DO

 Active

 

                Cataract Senile Nuclear 2020 - 12:00AM 10/02/2020 - 12:43P

M Wallace Dixon DO                            Resolved

 

             Dry Eye Syndrome 2020 - 12:00AM              Wallace james DO Active

 

             Vitreous Disorders Degeneration 2020 - 12:00AM              TABITHA Dixon DO 

Active







Plan of Treatment





             Future Appointments Date         Time         Location     Provider

 

             1 Year Follow-Up 2021   12:30PM      Jax Schrader MD [facility]

 Wallace Dixon DO

 

             1 Year Follow-Up 2022   9:20AM       Jax Schrader MD [facility]

 Wallace Dixon DO







Assessments





Includes: Assessments for all patient encounters



                    Findings            Encounter           Date

 

                    Pseudophakia        3 - 4 WK Post CAT SX with Wallace zapata DO 2020

 

                    Pseudophakia        1 Week Post OP with Wallace Dixon DO

 10/02/2020

 

                    Nuclear senile cataract POST OP VISIT WITH PRE-OP with Marce Dixon DO 

2020

 

                    Pseudophakia        POST OP VISIT WITH PRE-OP with Wallace DEB

parismireya DO 2020

 

                    Nuclear senile cataract 1 WK PREOP FOR SURGERY with Wallace Dixon DO 

09/10/2020

 

                    Dry eye syndrome    Cataract Evaluation with Wallace rodgers DO 2020

 

                    Nuclear senile cataract Cataract Evaluation with Wallace randlemireya DO 2020

 

                    Vitreous degeneration Cataract Evaluation with Wallace Uli gomes DO 2020

 

                    [place]              OCT RETINA Technical Component with Edwin Schrader MD, FACS 

2016







Instructions





Instructions not supported for this document typeNo Instructions Recorded



Medical Equipment - Implanted Devices





Includes: Current and historical DevicesNo Medical Equipment Recorded



Medications





Includes: Current and historical Medications



                    Current Medications (continue as prescribed)                

      

 

                                          







                    BromSite 0.075% Ophthalmic Solution 09/10/2020          Prov

ider: 



                                         Wallace Dixon DO

 

                                        Diagnosis:          Age-related nuclear 

cataract, left eye

 

                                        Three days prior to surgery start one dr

op two times a day in the left eye, RUN 

CARD                                                 

 

                                          







                    Inveltys 1% Ophthalmic Suspension 09/10/2020          Provid

er: 



                                         Wallace Dixon DO

 

                                        Diagnosis:          Age-related nuclear 

cataract, left eye

 

                                        Day of surgery remove patch start one dr

op two times a day in the left eye, RUN 

CARD                                                 

 

                                          







                    Citalopram 10MG Oral Tablet 2020          Provider: 

 

                                        Diagnosis:           

 

                                                             

 

                                          







                    Vitamin B12 1000 MCG Oral Tablet 2020          Provide

r: 

 

                                        Diagnosis:           

 

                                                             

 

                                          







                    Vitamin D3 50 MCG (2000 UT) Oral Tablet 2020          

Provider: 

 

                                        Diagnosis:           

 

                                                             

 

                                          







                    Omeprazole 40 MG Oral Capsule Delayed Release 2020    

      Provider: 

 

                                        Diagnosis:           

 

                                                             

 

                                          







                    Memantine HCl 10 MG Oral Tablet 2020          Provider

: 

 

                                        Diagnosis:           

 

                                                             

 

                                          







                    Atorvastatin Calcium 10 MG Oral Tablet 2020          P

rovider: 

 

                                        Diagnosis:           

 

                                                             

 

                                          







                    Tylenol PM Extra Strength 500-25 MG Oral Tablet 2020  

        Provider: 

 

                                        Diagnosis:           

 

                                                             

 

                                          







                    Past Medications on file                      

 

                                          







                    Besivance 0.6% Ophthalmic Suspension 09/10/2020 - 10/02/2020

 Provider: 



                                         Wallace Dixon DO

 

                                        Diagnosis:          Age-related nuclear 

cataract, left eye

 

                    Three days prior to surgery start one drop three times a day

 in the left eye                      



 

                                          







                    Omeprazole 20 MG Oral Tablet Delayed Release 2020 -  Provider: 

 

                                        Diagnosis:           

 

                                                             

 

                                          







Medications Administered





Includes: Administered Medications in patient's chartNo Administered Medications
Recorded



Vital Signs





Includes: Vital Signs from 2020 through 2021No Vital Signs Recorded 
For Specified Dates



Results





Includes: Results from 2020 through 2021No Results Recorded For 
Specified Dates



History of Present Illness





History of Present Illness not supported for this document typeNo History of 
Present Illness Recorded



Social History





                          Description               Last Updated

 

                          Tobacco non-user          2021

 

                          No smoking status : Never smoked/ Recode: 4 10/02/2020

 

                          No tobacco use            10/02/2020

 

                          Not using alcohol         10/02/2020

 

                          Not using drugs           10/02/2020







Procedures and Surgical History





Includes: Procedures from 2020 through 2021



           Procedures Code       Diagnosis  Performing Provider Service Location

 Service Date

 

                                        Extracapsular cataract removal with intr

aocular lens implant (Right Side, 

Related procedure/service by same physician during Post Op) 09025               

      Age-related 

nuclear cataract, right eye Wallace Dixon DO [facility] 

2020

 

                                        Ophthalmic biometry - IOL Master with IO

L calculation (Professional Comp., Right

Side)           [id number]           Age-related nuclear cataract, right eye Wallace Villafuerte MD [facility]                      2020

 

                    Extracapsular cataract removal with intraocular lens implant

 (Left side) 76461               

Age-related nuclear cataract, left eye Wallace Dixon DO      [facility]                                  2020

 

                    Intermediate Eye Exam Established Patient (Signi/Sep Eval. &

 Man.) [id number]               Age-

related nuclear cataract, left eye Wallace Villafuerte MD

 [facility]

                                        09/10/2020

 

                                        Ophthalmic biometry - IOL Master with IO

L calculation (WAIVER OF LIABILITY ON 

FILE (ABN), Left side) [id number]               Age-related nuclear cataract, left ey

e Wallace Villafuerte MD [facility]  09/10/2020







                          Surgical History          Last Updated

 

                          Surgical / procedural history 10/02/2020

 

                                        History of extracapsular cataract extrac

tion  PCIOL OS by Dr. Dixon 

2020 ~PCIOL OD by Dr. Dixon 2020 10/02/2020







Medical History





Includes: Medical History in patient's chart



                          Description               Last Updated

 

                          Recent change in medical history  Cataract Sx OD by Dr Unique Dixon 2020 

10/02/2020

 

                          History of arthritis      2020

 

                          History of hyperlipidemia 2020

 

                          Reported medical history  Ulcer, Memory loss 

0







Family History





Includes: Family History in patient's chart



                          Description               Last Updated

 

                          Daughter's history of diabetes mellitus 2020

 

                          Paternal history of cataract 2020

 

                          Son's history of diabetes mellitus 2020

 

                          Sororal history of diabetes mellitus 2020







Review of Systems





Review of Systems not supported for this document typeNo Review of Systems 
Recorded



Mental Status





Mental Status not supported for this document type



                                        Description

 

                                        Oriented to time, place, and person

 

                                        Anxiety







Functional Status





Functional Status not supported for this document typeNo Functional Status 
Recorded



Physical Exam





Physical Exam not supported for this document typeNo Physical Exam Recorded



Immunizations





Includes: Immunizations in patient's chartNo Immunizations Recorded



Allergies





Includes: Active, inactive, and resolved AllergiesNo Known Allergies



Encounters





Includes: Encounters from 2020 through 2021



          Encounter Provider  Location  Date      Check-In Time Check-Out Time D

iagnosis

 

             TRIAGE NON URGENT Wallace Villafuerte MD [facility] 2021   

1:58PM                    3:54PM                     

 

             3 - 4 WK Post CAT SX Wallace Villafuerte MD [facility] 

2020   

2:07PM                    4:00PM                    Pseudophakia

 

           1 Week Post OP Wallace Villafuerte MD [facility] 10/02/20

20 12:17PM    

1:27PM                                  Pseudophakia

 

                    Extracapsular cataract removal w/IOL implant Wallace Hoover

in [facility]  2020 8:00AM 6:21AM           

 

                POST OP VISIT WITH PRE-OP Wallace Villafuerte MD [facility] 

2020          12:45PM             1:42PM              Pseudophakia, Catara

ct Senile Nuclear

 

                    Extracapsular cataract removal w/IOL implant Wallace Hoover

in [facility]  2020      6:02AM          6:04AM           

 

             1 WK PREOP FOR SURGERY Wallace Villafuerte MD [facility] 09/10/2020   

11:59AM                   12:58PM                   Cataract Senile Nuclear







Insurance





Includes: Active Insurance Policies



           Plan Name  Member ID  Group #    Subscriber Relationship Effective Da

shannan

 

           1 - [employer]VANTAGE [id number]              Elvia L [place] Shandra

f        







Advance Directives





Includes: Current Advance DirectivesNo Advance Directives Recorded



Health Concerns





Includes: Active Health ConcernsNo Active Health Concerns Recorded



Goals





Includes: Active GoalsNo Active Goals Recorded



Interventions





Includes: Interventions for active GoalsNo Interventions Recorded



Evaluations & Outcomes





Includes: Evaluations & Outcomes for active GoalsNo Outcomes Recorded

## 2021-10-21 NOTE — CCD
Continuity of Care Document (CCD)

                             Created on: 10/18/2021



Helder, Elviabar Quintana

External Reference #: [id number]

: 1935

Sex: Female



Demographics





                          Address                   [address] [place]  [address]

 

                          Home Phone                [phone]

 

                          Preferred Language        Unknown

 

                          Marital Status            

 

                          B[place]ai Affiliation     Unknown

 

                          Race                      White

 

                          Ethnic Group              Declined to Specify/Unknown





Author





                          Author                    Elvia TOLENTINO

 

                          Organization              Unknown

 

                          Address                    [place]  [address]



 

                          Phone                     [phone]







Care Team Providers





                    Care Team Member Name Role                Phone

 

                    Brooklyn Flores D.O. AUTM                [phone]

 

                    KALYAN Bartholomew M.D. AUTM                [phone]

 

                    [facility] AUTM                [phone]

 

                    [facility] AUTM     

           [phone]

 

                    Omer Gutierrez M.D. AUTM                [phone]







Problems





                    Active Problems     Provider            Date

 

                    H/O: peptic ulcer   ELMER Kan Onset: 2019

 

                    Mixed hyperlipidemia ELMER Kan Onset: 2019

 

                    Mild recurrent major depression ELMER Kan Onset: 0

2019

 

                    Dementia            ELMER Kan Onset: 10/10/2019

 

                    Gastroesophageal reflux disease ELMER Kan Onset: 1

0/10/2019







Social History





                Type            Date            Description     Comments

 

                Birth Sex                       Unknown          

 

                ETOH Use                        Denies alcohol use  

 

                Tobacco Use     Start: Unknown  Patient has never smoked  

 

                Recreational Drug Use                 Denies Drug Use  

 

                Smoking Status  Reviewed: 21 Patient has never smoked  

 

                Exercise Type/Frequency                 Does not exercise  

 

                Sun Exposure                    Does not use sunscreen  

 

                Seat Belt/Car Seat                 Always uses seat belt  







Allergies and adverse reactions





             Active Allergies Criticality  Reaction | Severity Comments     Date

 

             Iodine       Unable to assess criticality                          

 2018







Medications





           Active Medications SIG        Qnty       Indications Ordering Provide

r Date

 

                          Bactrim DS                     800-160mg Tablets      

             take one 

tablet by mouth every 12 hours for ten days 20tabs              R35.0           

    PETRONA Palacios.O.                                    10/15/2021

 

                                        Calcium 600+D High Potency              

       600-400mg-Unit Tablets           

             1 by mouth twice a day 180tabs                   Brooklyn sherman, D.O. 2021

 

                          Prevagen Extra Strength                     20mg Capsu

les                   take

one capsule by mouth daily. 90caps                          PETRONA Palacios.O. 2021

 

                          Memantine HCL                     10mg Tablets        

           Take One Tablet

By Mouth Twice A Day 180tabs         F03.90          PETRONA Palacios.O. 

10/07/2020

 

                                        Fluticasone Propionate Nasal Spray      

               50mcg/Act Suspension     

                two sprays each nostril once daily 29.7ml          J06.9        

   PETRONA Yee.O.                            2020

 

                          Omeprazole                     40mg Capsules DR       

            take one 

capsule by mouth every day 90caps                          PETRONA Palacios.O. 2019

 

                          Diclofenac Sodium                     1% Gel          

         apply 3 grams to 

painful area of left elbow twice a day as needed 100units            M79.642    

         PETRONA Yee.O.                            04/10/2019

 

                          Citalopram Hydrobromide                     10mg Table

ts                   Take 

One Tablet By Mouth Every Day 90tabs                          Brooklyn sherman, D.O. 

 

                          Vitamin D3                     2000Unit Capsules      

             1 by mouth 

every day                                       Unknown         

 

                          Vitamin B-12                     1000mcg Tablets      

             1 by mouth 

every day                                       Unknown         

 

                          Atorvastatin Calcium                     10mg Tablets 

                  Take One

Tablet By Mouth Every Evening 90tabs                          Brooklyn sherman, D.O. 

 

                          Tylenol                     325mg Tablets             

      take 1-2 tabs daily 

as needed                                       Unknown         

 

                                        History Medications

 

                                        Tetanus/Diphtheria Toxoids-Adsorbed Adul

t                     2-2LF/0.5ML 

Suspension                   administer tetanus with pertussis at pharmacy. 5ml 

                

                          PETRONA Palacios.O. 2021 - 2021







Immunizations





             CPT Code     Status       Date         Vaccine      Lot #

 

                [id number]           Given           2021      Tetanus, Diphthe

aixa Toxoids/Acellular Pertussis Vaccine 7

Or >                                    [id number]

 

             [id number]        Given        2020   Pneumococcal Vaccine 23 [place]

nt 2Yrs Or Older  

 

                [id number]           Given           2019      Pneumococcal Con

jugate Vaccine 13 Valent For 

Intramuscular Use                        

 

             U-Flu        Given        2019   Influenza,Unspecified  







Vital Signs





                Date            Vital           Result          Comment

 

                10/15/2021  2:11pm BP Systolic     92 mmHg          

 

                    BP Diastolic        58 mmHg              

 

                    Height              61.7 inches         5'1.70"

 

                    Heart Rate          80 /min              

 

                    Respiratory Rate    12 /min              

 

                    Body Temperature    98.5 F             

 

                    O2 % BldC Oximetry  95 %                 

 

                    Ideal Body Weight   105 lb               

 

                2021 10:03am BP Systolic     124 mmHg         

 

                    BP Diastolic        74 mmHg              

 

                    Height              61.7 inches         5'1.70"

 

                    Weight              144.25 lb            

 

                    BMI (Body Mass Index) 26.6 kg/m2           

 

                    Heart Rate          74 /min              

 

                    Respiratory Rate    18 /min              

 

                    Body Temperature    97.8 F             

 

                    O2 % BldC Oximetry  95 %                 

 

                    Ideal Body Weight   105 lb               







Results





        Test    Acquired Date Facility Test    Result  H/L     Range   Note

 

                    Ua Routine          10/18/2021          [facility]

[facility]

             Ua Specific Gravity <pending>                               

 

             Ua PH Test Strip <pending>                               

 

             Ua Color     <pending>                               

 

             Ua Appearance <pending>                               

 

             Ua WBC       <pending>                               

 

             Ua Protein   <pending>                               

 

             Urine Glucose QL <pending>                               

 

             Ua Ketones   <pending>                               

 

             Ua Bilirubin <pending>                               

 

             Urine Urobilinogen QN TS <pending>                               

 

             Urine Nitrite QL TS <pending>                               

 

             Ua Occult Blood <pending>                               

 

                    Laboratory test finding 10/18/2021          [facility]

[facility]

             Culture Urine Routine <pending>                               

 

                    CMP                 10/18/2021          [facility]

[facility]

             Albumin Serum/Plasma <pending>                               

 

             Alt - SGPT   <pending>                               

 

             Calcium Ser/Plasma Mass/Vol <pending>                              

 

 

             Carbon Dioxide Ser/Plasm <pending>                               

 

             Chloride Serum/Plasma <pending>                               

 

             Creatinine Serum Mass/Vol <pending>                               

 

             Glucose Serum <pending>                               

 

             Alkaline Phosphatase <pending>                               

 

             Potassium    <pending>                               

 

             Protein Total <pending>                               

 

             Sodium       <pending>                               

 

             Ast - Sgot   <pending>                               

 

             BUN - Urea Nitrogen <pending>                               

 

                    CBC W/Auto Differential 10/18/2021          [facility]

[facility]

             White Blood Count Ser Auto CNT <pending>                           

    

 

             RBC Red Blood Count <pending>                               

 

             Hemoglobin Blood <pending>                               

 

             Hematocrit   <pending>                               

 

             MCV (Corpuscular Volume) <pending>                               

 

             MCH (Corpuscular Hemoglobin) <pending>                             

  

 

             MCHC (Corpuscular Hemog Conc) <pending>                            

   

 

             RDW          <pending>                               

 

             Platelet Count Blood Auto CNT <pending>                            

   

 

             MPV          <pending>                               

 

             Neutrophils  <pending>                               

 

             Fluid Bands  <pending>                               

 

             Fluid Lymphocytes <pending>                               

 

             Monocytes    <pending>                               

 

             Fluid Body Eosinophils <pending>                               

 

             Basophils %  <pending>                               

 

             Absolute Basophils <pending>                               

 

             Absolute Eosinophils <pending>                               

 

             Absolute Lymphocytes <pending>                               

 

             Absolute Monocytes <pending>                               

 

             Absolute Neutrophils Auto CNT <pending>                            

   

 

                    Laboratory test finding 10/18/2021          [facility]

[facility]

             CRP (High Sensitivity) <pending>                               







Procedures





                Date            Code            Description     Status

 

                10/15/2021      15602           Office/Outpatient Established Lo

w MDM 20-29 Min Completed

 

                2021      05494           Office/Outpatient Established Mo

d MDM 30-39 Min Completed

 

                2021      19312           Office/Outpatient Established Lo

w MDM 20-29 Min Completed

 

                2021      92011           Office/Outpatient Established Lo

w MDM 20-29 Min Completed

 

                2020      [id number]        Mammogram       Completed

 

                2019      [id number]        Mammogram       Completed







[employer]





                                        Description

 

                                        No Information Available







Encounters





           Type       Date       Location   Provider   Dx         Diagnosis

 

                Office Visit    10/15/2021  2:00p [facility] ELMER Kan               R42                       Dizziness and giddiness

 

                          R35.0                     Frequency of micturition

 

                Office Visit    2021 10:00a [facility] ELMER Kan               F33.0                     Major depressive disorder, r

ecurrent, mild

 

                          E78.2                     Mixed hyperlipidemia

 

                          K21.9                     Gastro-esophageal reflux dis

ease without esophagitis

 

                          F03.90                    Unspecified dementia without

 behavioral disturbance

 

                          Z87.11                    Personal history of peptic u

lcer disease

 

                          Z79.899                   Other long term (current) dr roberts therapy

 

                Office Visit    2021  8:30a [facility] ELMER Kan               Z48.02                    Encounter for removal of sut

ures

 

                          W10.8xxA                  Fall (on) (from) other stair

s and steps, initial encounter

 

                          S00.83xA                  Contusion of other part of h

ead, initial encounter

 

                          Z23                       Encounter for immunization

 

                Office Visit    2021  1:30p [facility] ELMER Kan               S01.81xA                  Laceration w/o foreign body 

of oth part of head, init 

encntr

 

                          W10.8xxA                  Fall (on) (from) other stair

s and steps, initial encounter

 

                          S00.83xA                  Contusion of other part of h

ead, initial encounter







Assessments





                Date            Code            Description     Provider

 

                10/15/2021      R42             Dizziness and giddiness ELMER Kan

 

                10/15/2021      R35.0           Frequency of micturition ELMER Kan

 

                2021      F33.0           Major depressive disorder, recur

rent, mild ELMER Kan

 

                2021      E78.2           Mixed hyperlipidemia ELMER Ramos

 

                2021      K21.9           Gastro-esophageal reflux disease

 without esophagitis ELMER Kan

 

                2021      F03.90          Unspecified dementia without beh

avioral disturbance ELMER Kan

 

                2021      Z87.11          Personal history of peptic ulcer

 disease ELMER Kan

 

                2021      Z79.899         Other long term (current) drug t

herapy ELMER Kan

 

                2021      Z48.02          Encounter for removal of sutures

 ELMER Kan

 

                2021      W10.8xxA        Fall (on) (from) other stairs an

d steps, initial encounter 

ELMER Kan

 

                2021      S00.83xA        Contusion of other part of head,

 initial encounter ELMER Kan

 

                2021      Z23             Encounter for immunization ELMER Soliman

 

                    2021          S01.81xA            Laceration without f

oreign body of other part of head, 

initial encounter                       ELMER Kan

 

                2021      W10.8xxA        Fall (on) (from) other stairs an

d steps, initial encounter 

ELMER Kan

 

                2021      S00.83xA        Contusion of other part of head,

 initial encounter ELMER Kan







Plan of Treatment

Future Appointment(s):* 2022 10:00 am - ELMER Kan at [facility]

10/15/2021 - ELMER Kan* R42 Dizziness and giddiness* Comments:* Please
  make sure your drink more fluids and increase your salt intake.





* R35.0 Frequency of micturition* New Medication:* Bactrim -160 mg - take 
  one tablet by mouth every 12 hours for ten days



* Comments:* increased urinary frequency, confusion, headache and will treat 
  presumptively for a UTI









Functional Status





                                        Description

 

                                        No Information Available







Mental Status





                                        Description

 

                                        No Information Available







Referrals





                                        Description

 

                                        No Information Available

## 2021-10-21 NOTE — CCD
Continuity of Care Document (CCD)

                             Created on: 10/19/2021



Helder Elviabar Quintana

External Reference #: [id number]

: 1935

Sex: Female



Demographics





                          Address                   [address] [place]  [address]

 

                          Home Phone                [phone]

 

                          Preferred Language        Unknown

 

                          Marital Status            

 

                          Pentecostal Affiliation     Unknown

 

                          Race                      White

 

                          Ethnic Group              Declined to Specify/Unknown





Author





                          Author                    Elvia TOLENTINO

 

                          Organization              Unknown

 

                          Address                    [place]  [address]



 

                          Phone                     [phone]







Care Team Providers





                    Care Team Member Name Role                Phone

 

                    Brooklyn Flores D.O. AUTM                [phone]

 

                    KALYAN Bartholomew M.D. AUTM                [phone]

 

                    [facility] AUTM                [phone]

 

                    [facility] AUTM     

           [phone]

 

                    Omer Gutierrez M.D. AUTM                [phone]







Problems





                    Active Problems     Provider            Date

 

                    H/O: peptic ulcer   ELMER Kan Onset: 2019

 

                    Mixed hyperlipidemia ELMER Kan Onset: 2019

 

                    Mild recurrent major depression ELMER Kan Onset: 0

2019

 

                    Dementia            ELMER Kan Onset: 10/10/2019

 

                    Gastroesophageal reflux disease ELMER Kan Onset: 1

0/10/2019







Social History





                Type            Date            Description     Comments

 

                Birth Sex                       Unknown          

 

                ETOH Use                        Denies alcohol use  

 

                Tobacco Use     Start: Unknown  Patient has never smoked  

 

                Recreational Drug Use                 Denies Drug Use  

 

                Smoking Status  Reviewed: 21 Patient has never smoked  

 

                Exercise Type/Frequency                 Does not exercise  

 

                Sun Exposure                    Does not use sunscreen  

 

                Seat Belt/Car Seat                 Always uses seat belt  







Allergies and adverse reactions





             Active Allergies Criticality  Reaction | Severity Comments     Date

 

             Iodine       Unable to assess criticality                          

 2018







Medications





           Active Medications SIG        Qnty       Indications Ordering Provide

r Date

 

                          Bactrim DS                     800-160mg Tablets      

             take one 

tablet by mouth every 12 hours for ten days 20tabs              R35.0           

    PETRONA Palacios.O.                                    10/15/2021

 

                                        Calcium 600+D High Potency              

       600-400mg-Unit Tablets           

             1 by mouth twice a day 180tabs                   Brooklyn sherman, D.O. 2021

 

                          Prevagen Extra Strength                     20mg Capsu

les                   take

one capsule by mouth daily. 90caps                          PETRONA Palacios.O. 2021

 

                          Memantine HCL                     10mg Tablets        

           Take One Tablet

By Mouth Twice A Day 180tabs         F03.90          PETRONA Palacios.O. 

10/07/2020

 

                                        Fluticasone Propionate Nasal Spray      

               50mcg/Act Suspension     

                two sprays each nostril once daily 29.7ml          J06.9        

   PETRONA Yee.O.                            2020

 

                          Omeprazole                     40mg Capsules DR       

            take one 

capsule by mouth every day 90caps                          PETRONA Palacios.O. 2019

 

                          Diclofenac Sodium                     1% Gel          

         apply 3 grams to 

painful area of left elbow twice a day as needed 100units            M79.642    

         PETRONA Yee.O.                            04/10/2019

 

                          Citalopram Hydrobromide                     10mg Table

ts                   Take 

One Tablet By Mouth Every Day 90tabs                          Brooklyn sherman, D.O. 

 

                          Vitamin D3                     2000Unit Capsules      

             1 by mouth 

every day                                       Unknown         

 

                          Vitamin B-12                     1000mcg Tablets      

             1 by mouth 

every day                                       Unknown         

 

                          Atorvastatin Calcium                     10mg Tablets 

                  Take One

Tablet By Mouth Every Evening 90tabs                          Brooklyn sherman, D.O. 

 

                          Tylenol                     325mg Tablets             

      take 1-2 tabs daily 

as needed                                       Unknown         

 

                                        History Medications

 

                                        Tetanus/Diphtheria Toxoids-Adsorbed Adul

t                     2-2LF/0.5ML 

Suspension                   administer tetanus with pertussis at pharmacy. 5ml 

                

                          PETRONA Palacios.O. 2021 - 2021







Immunizations





             CPT Code     Status       Date         Vaccine      Lot #

 

                [id number]           Given           2021      Tetanus, Diphthe

aixa Toxoids/Acellular Pertussis Vaccine 7

Or >                                    [id number]

 

             [id number]        Given        2020   Pneumococcal Vaccine 23 [place]

nt 2Yrs Or Older  

 

                [id number]           Given           2019      Pneumococcal Con

jugate Vaccine 13 Valent For 

Intramuscular Use                        

 

             U-Flu        Given        2019   Influenza,Unspecified  







Vital Signs





                Date            Vital           Result          Comment

 

                10/15/2021  2:11pm BP Systolic     92 mmHg          

 

                    BP Diastolic        58 mmHg              

 

                    Height              61.7 inches         5'1.70"

 

                    Heart Rate          80 /min              

 

                    Respiratory Rate    12 /min              

 

                    Body Temperature    98.5 F             

 

                    O2 % BldC Oximetry  95 %                 

 

                    Ideal Body Weight   105 lb               

 

                2021 10:03am BP Systolic     124 mmHg         

 

                    BP Diastolic        74 mmHg              

 

                    Height              61.7 inches         5'1.70"

 

                    Weight              144.25 lb            

 

                    BMI (Body Mass Index) 26.6 kg/m2           

 

                    Heart Rate          74 /min              

 

                    Respiratory Rate    18 /min              

 

                    Body Temperature    97.8 F             

 

                    O2 % BldC Oximetry  95 %                 

 

                    Ideal Body Weight   105 lb               







Results





        Test    Acquired Date Facility Test    Result  H/L     Range   Note

 

                    Ua Routine          10/19/2021          [facility]

ng (Registration)

                                        [address] [place] [address] [phone] Appearance, Urine HAZY       Normal     Clear       

 

             Color, Urine YELLOW       Normal       Yellow        

 

             PH,Urine     5.0 units    Normal       5.0-9.0       

 

             Specific Gravity Urine Auto 1.018        Normal       1.002-1.035  

 

 

             Protein, Urine Auto NEGATIVE mg/dL Normal       Negative      

 

             Glucose, Urine (Ua) Auto NEGATIVE mg/dL Normal       Negative      

 

             Ketone, Urine Auto NEGATIVE mg/dL Normal       Negative      

 

             Urobilinogen, Urine Auto 2.0 mg/dL    High         0.0-2.0       

 

             Bilirubin, Urine Auto NEGATIVE     Normal       Negative      

 

             Nitrite, Urine Auto NEGATIVE     Normal       Negative      

 

             Leukocyte Esterase, Urine Auto NEGATIVE     Normal       Negative  

    

 

             Blood, Urine Blood 1+           High         Negative      

 

             WBC, Urine Auto 2 /HPF       Normal       0-3           

 

             RBC, Urine Auto 7 /HPF       High         0-3           

 

             Bacteria, Urine Auto NEGATIVE     Normal       Negative      

 

             Squamous Epithelial Cell Ur AU 3 /HPF       Normal       0-6       

    

 

             Mucus, Urine SMALL        Normal       Negative      

 

             Hyaline Cast, Urine Auto 0 /LPF       Normal       0-1           

 

                    Comprehensive Metabolic Profil 10/19/2021          [facility]

tient Testing (Registration)

                                        [address] [place] [address] [phone] Glucose, Fasting 79 mg/dL   Normal           

 

             Blood Urea Nitrogen 18 mg/dL     Normal       7-18          

 

             Creatinine For GFR 0.90 mg/dL   Normal       0.55-1.30     

 

             Glomerular Filtration Rate > 60.0       Normal       >32          1

 

             Sodium Level 139 mEq/L    Normal       136-145       

 

             Potassium Serum 4.3 mEq/L    Normal       3.5-5.1       

 

             Chloride Level 108 mEq/L    High                 

 

             Carbon Dioxide Level 27 mEq/L     Normal       21-32         

 

             Anion Gap    4 mEq/L      Low          8-16          

 

             Calcium Level 9.0 mg/dL    Normal       8.8-10.2      

 

             Ast/Sgot     18 U/L       Normal       7-37          

 

             Alt/SGPT     22 U/L       Normal       12-78         

 

             Alkaline Phosphatase 85 U/L       Normal               

 

             Bilirubin,Total 0.3 mg/dL    Normal       0.2-1.0       

 

             Total Protein 6.0 GM/DL    Low          6.4-8.2       

 

             Albumin      3.3 GM/DL    Normal       3.2-5.2       

 

             Albumin/Globulin Ratio 1.2          Normal       1.2-2.2       

 

                    CBC With Differential 10/19/2021          [facility] [place]

ting (Registration)

                                        [address] [place], [place] [id number] [phone] White Blood Count 4.5 10     Normal     4.0-10.0    

 

             Red Blood Count 4.05 10      Normal       4.00-5.40     

 

             Hemoglobin   12.7 g/dL    Normal       12.0-15.5     

 

             Hematocrit   38.1 %       Normal       36.0-47.0     

 

             Mean Corpuscular Volume 94.1 fl      Normal       80.0-96.0     

 

             Mean Corpuscular Hemoglobin 31.4 pg      Normal       27.0-33.0    

 

 

             Mean Corpuscular HGB Conc 33.3 g/dL    Normal       32.0-36.5     

 

             Red Cell Distribution Width 13.0 %       Normal       11.5-14.5    

 

 

             Platelet Count, Automated 147 10       Low          150-450       

 

             Neutrophils % 60.3 %       Normal       36.0-66.0     

 

             Lymph %      24.1 %       Normal       24.0-44.0     

 

             Mono %       9.5 %        High         2.0-8.0       

 

             Eos %        5.3 %        High         0.0-3.0       

 

             Baso %       0.4 %        Normal       0.0-1.0       

 

             Immature Granulocyte % 0.4 %        Normal       0-3.0         

 

             Nucleated Red Blood Cell % 0.0 %        Normal       0-0           

 

             Neutrophils # 2.7 10       Normal       1.5-8.5       

 

             Lymph #      1.1 10       Low          1.5-5.0       

 

             Mono #       0.4 10       Normal       0.0-0.8       

 

             Eos #        0.2 10       Normal       0.0-0.5       

 

             Baso #       0.0 10       Normal       0.0-0.2       

 

                    Laboratory test finding 10/19/2021          [facility]

esting (Registration)

                                        [address] [place] [address] [phone] C Reactive Protein Quantitativ 3.08 mg/dL High       0

.00-0.30   

 

                    Ua Routine          10/18/2021          [facility]

             Ua Specific Gravity <pending>                               

 

             Ua PH Test Strip <pending>                               

 

             Ua Color     <pending>                               

 

             Ua Appearance <pending>                               

 

             Ua WBC       <pending>                               

 

             Ua Protein   <pending>                               

 

             Urine Glucose QL <pending>                               

 

             Ua Ketones   <pending>                               

 

             Ua Bilirubin <pending>                               

 

             Urine Urobilinogen QN TS <pending>                               

 

             Urine Nitrite QL TS <pending>                               

 

             Ua Occult Blood <pending>                               

 

                    [facility]20[facility]          [facility]

             Culture Urine Routine <pending>                               

 

                    CMP                 [facility]18/2021          [facility]

             Albumin Serum/Plasma <pending>                               

 

             Alt - SGPT   <pending>                               

 

             Calcium Ser/Plasma Mass/Vol <pending>                              

 

 

             Carbon Dioxide Ser/Plasm <pending>                               

 

             Chloride Serum/Plasma <pending>                               

 

             Creatinine Serum Mass/Vol <pending>                               

 

             Glucose Serum <pending>                               

 

             Alkaline Phosphatase <pending>                               

 

             Potassium    <pending>                               

 

             Protein Total <pending>                               

 

             Sodium       <pending>                               

 

             Ast - Sgot   <pending>                               

 

             BUN - Urea Nitrogen <pending>                               

 

                    CBC W/Auto Differential 10/18/2021          [facility]

             White Blood Count Ser Auto CNT <pending>                           

    

 

             RBC Red Blood Count <pending>                               

 

             Hemoglobin Blood <pending>                               

 

             Hematocrit   <pending>                               

 

             MCV (Corpuscular Volume) <pending>                               

 

             MCH (Corpuscular Hemoglobin) <pending>                             

  

 

             MCHC (Corpuscular Hemog Conc) <pending>                            

   

 

             RDW          <pending>                               

 

             Platelet Count Blood Auto CNT <pending>                            

   

 

             MPV          <pending>                               

 

             Neutrophils  <pending>                               

 

             Fluid Bands  <pending>                               

 

             Fluid Lymphocytes <pending>                               

 

             Monocytes    <pending>                               

 

             Fluid Body Eosinophils <pending>                               

 

             Basophils %  <pending>                               

 

             Absolute Basophils <pending>                               

 

             Absolute Eosinophils <pending>                               

 

             Absolute Lymphocytes <pending>                               

 

             Absolute Monocytes <pending>                               

 

             Absolute Neutrophils Auto CNT <pending>                            

   

 

                    Laboratory test finding 10/18/2021          [facility]

[facility]

             CRP (High Sensitivity) <pending>                               







                          1                         Units are mL/min/1.73 m2



Chronic Kidney Disease Staging per NKF:



Stage I & II   GFR >=60       Normal to Mildly Decreased

Stage III      GFR 30-59      Moderately Decreased

Stage IV       GFR 15-29      Severely Decreased

Stage V        GFR <15        Very Little GFR Left

ESRD           GFR <15 on RRT









Procedures





                Date            Code            Description     Status

 

                10/15/2021      91243           Office/Outpatient Established Lo

w MDM 20-29 Min Completed

 

                2021      07335           Office/Outpatient Established Mo

d MDM 30-39 Min Completed

 

                2021      76209           Office/Outpatient Established Lo

w MDM 20-29 Min Completed

 

                2021      24742           Office/Outpatient Established Lo

w MDM 20-29 Min Completed

 

                2020      [id number]        Mammogram       Completed

 

                2019      [id number]        Mammogram       Completed







[employer]





                                        Description

 

                                        No Information Available







Encounters





           Type       Date       Location   Provider   Dx         Diagnosis

 

                Office Visit    10/15/2021  2:00p [facility] ELMER Kan               R42                       Dizziness and giddiness

 

                          R35.0                     Frequency of micturition

 

                Office Visit    2021 10:00a [facility] ELMER Kan               F33.0                     Major depressive disorder, r

ecurrent, mild

 

                          E78.2                     Mixed hyperlipidemia

 

                          K21.9                     Gastro-esophageal reflux dis

ease without esophagitis

 

                          F03.90                    Unspecified dementia without

 behavioral disturbance

 

                          Z87.11                    Personal history of peptic u

lcer disease

 

                          Z79.899                   Other long term (current) dr roberts therapy

 

                Office Visit    2021  8:30a [facility] ELMER Kan               Z48.02                    Encounter for removal of sut

ures

 

                          W10.8xxA                  Fall (on) (from) other stair

s and steps, initial encounter

 

                          S00.83xA                  Contusion of other part of h

ead, initial encounter

 

                          Z23                       Encounter for immunization

 

                Office Visit    2021  1:30p [facility] ELMER Kan               S01.81xA                  Laceration w/o foreign body 

of oth part of head, init 

encntr

 

                          W10.8xxA                  Fall (on) (from) other stair

s and steps, initial encounter

 

                          S00.83xA                  Contusion of other part of h

ead, initial encounter







Assessments





                Date            Code            Description     Provider

 

                10/15/2021      R42             Dizziness and giddiness ELMER Kan

 

                10/15/2021      R35.0           Frequency of micturition ELMER Kan

 

                2021      F33.0           Major depressive disorder, recur

rent, mild ELMER Kan

 

                2021      E78.2           Mixed hyperlipidemia ELMER Ramos

 

                2021      K21.9           Gastro-esophageal reflux disease

 without esophagitis ELMER Kan

 

                2021      F03.90          Unspecified dementia without beh

avioral disturbance ELMER Kan

 

                2021      Z87.11          Personal history of peptic ulcer

 disease ELMER Kan

 

                2021      Z79.899         Other long term (current) drug t

herapy ELMER Kan

 

                2021      Z48.02          Encounter for removal of sutures

 ELMER Kan

 

                2021      W10.8xxA        Fall (on) (from) other stairs an

d steps, initial encounter 

ELMER Kan

 

                2021      S00.83xA        Contusion of other part of head,

 initial encounter ELMER Kan

 

                2021      Z23             Encounter for immunization ELMER Soliman

 

                    2021          S01.81xA            Laceration without f

oreign body of other part of head, 

initial encounter                       ELMER Kan

 

                2021      W10.8xxA        Fall (on) (from) other stairs an

d steps, initial encounter 

ELMER Kan

 

                2021      S00.83xA        Contusion of other part of head,

 initial encounter ELMER Kan







Plan of Treatment

Future Appointment(s):* 2022 10:00 am - ELMER Kan at [facility]

10/15/2021 - ELMER Kan* R42 Dizziness and giddiness* Comments:* Please
  make sure your drink more fluids and increase your salt intake.





* R35.0 Frequency of micturition* New Medication:* Bactrim -160 mg - take 
  one tablet by mouth every 12 hours for ten days



* Comments:* increased urinary frequency, confusion, headache and will treat 
  presumptively for a UTI









Functional Status





                                        Description

 

                                        No Information Available







Mental Status





                                        Description

 

                                        No Information Available







Referrals





                                        Description

 

                                        No Information Available

## 2021-10-21 NOTE — REPVR
PROCEDURE INFORMATION: 

Exam: CT Cervical Spine Without Contrast 

Exam date and time: 10/21/2021 11:57 AM 

Age: 86 years old 

Clinical indication: Injury or trauma; Fall; Blunt trauma; Additional info: 

Fall, head injury 



TECHNIQUE: 

Imaging protocol: Computed tomography images of the cervical spine without 

contrast. 

Radiation optimization: All CT scans at this facility use at least one of these 

dose optimization techniques: automated exposure control; mA and/or kV 

adjustment per patient size (includes targeted exams where dose is matched to 

clinical indication); or iterative reconstruction. 



COMPARISON: 

CT Head without contrast 6/5/2021 6:22 PM 



FINDINGS: 

Vertebrae: There are moderate degenerative changes for age most pronounced 

C5-C6 and C6-C7. Reversal lordosis suggests spasm. Multilevel listhesis likely 

degenerative. No displaced fracture line, high-grade compression deformity, or 

worrisome malalignment. 

Epidural space: No epidural fluid. 



Soft tissues: Unremarkable. 

Sinuses: Mild the mucosal thickening in the maxillary sinuses. No air-fluid 

levels. 

Mastoid air cells: The mastoids are well aerated. 

Thyroid: Dominant low-density thyroid lesion on the right measuring 14 mm with 

smaller lesions bilaterally measuring up to about 10 mm. 

Lymph nodes: No confluent lymphadenopathy in the neck. No confluent lymph node 

enlargement. 

Vasculature: Carotid vascular calcifications can be followed with ultrasound. 

Lungs: Lung apices are normal. 

Disc spaces:  Bulging discs at multiple levels with central stenosis probably 

most pronounced at C5-C6 and C6-C7 but still relatively mild. 



IMPRESSION: 

1. Degenerative changes without acute fracture.

2. Carotid vascular calcifications likely follow-up ultrasound.

3. Thyroid lesions which should be managed as outlined below.

COMMENTS: 

Consistent with the American College of Radiology's Incidental Findings 

Committee white paper (J Am Riley Radiol 2015): In patients aged 35 years and 

older with an incidental thyroid nodule equal to or greater than 1.5 cm 

detected on CT, MRI or extrathyroidal US, further evaluation with dedicated 

thyroid US is recommended for patients with normal life expectancy and without 

comorbidities. For smaller nodules without suspicious features, no further 

evaluation or follow up is recommended. 



Electronically signed by: Hamilton Thomas On 10/21/2021  12:25:56 PM

## 2021-11-04 ENCOUNTER — HOSPITAL ENCOUNTER (OUTPATIENT)
Dept: HOSPITAL 53 - M RAD | Age: 86
End: 2021-11-04
Attending: PHYSICIAN ASSISTANT
Payer: MEDICARE

## 2021-11-04 DIAGNOSIS — I65.23: Primary | ICD-10-CM

## 2021-11-04 NOTE — REP
INDICATION:

Assess stenosis



TECHNIQUE:

Carotid ultrasonography was performed bilaterally



FINDINGS:

Right:



CCA systolic: 73.0 centimeters/second      CCA diastolic: 16.5 centimeters/second



ICA systolic: 52.4 centimeters/second      ICA diastolic: 18.5 centimeters/second



ICA CCA ratio: 0.72



Left:



CCA systolic: A 5.4 centimeters/second      CCA diastolic: 18.4 centimeters/second





ICA systolic: 54.1 centimeters/second     ICA diastolic: 21.1 centimeters/second



ICA CCA ratio: 0.63

Antegrade flow

Vertebral artery: Right: Antegrade flow     left:



Echogenic material seen along the carotid arterial walls some of which casts and

acoustic shadow



IMPRESSION:

According to the SRU criteria there is less than 50% stenosis of the internal carotid

artery bilaterally.  This is secondary to both calcified and noncalcified atheromatous

plaque formation.





<Electronically signed by Sina Jha > 11/04/21 0053

## 2021-11-27 ENCOUNTER — HOSPITAL ENCOUNTER (INPATIENT)
Dept: HOSPITAL 53 - M ED | Age: 86
LOS: 10 days | Discharge: NURSING, REHAB OR LONG-TERM CARE | DRG: 481 | End: 2021-12-07
Attending: INTERNAL MEDICINE | Admitting: STUDENT IN AN ORGANIZED HEALTH CARE EDUCATION/TRAINING PROGRAM
Payer: MEDICARE

## 2021-11-27 VITALS — SYSTOLIC BLOOD PRESSURE: 101 MMHG | DIASTOLIC BLOOD PRESSURE: 59 MMHG

## 2021-11-27 VITALS — DIASTOLIC BLOOD PRESSURE: 60 MMHG | SYSTOLIC BLOOD PRESSURE: 102 MMHG

## 2021-11-27 VITALS — HEIGHT: 66 IN | WEIGHT: 146.61 LBS | BODY MASS INDEX: 23.56 KG/M2

## 2021-11-27 VITALS — SYSTOLIC BLOOD PRESSURE: 97 MMHG | DIASTOLIC BLOOD PRESSURE: 56 MMHG

## 2021-11-27 VITALS — DIASTOLIC BLOOD PRESSURE: 61 MMHG | SYSTOLIC BLOOD PRESSURE: 103 MMHG

## 2021-11-27 VITALS — DIASTOLIC BLOOD PRESSURE: 57 MMHG | SYSTOLIC BLOOD PRESSURE: 100 MMHG

## 2021-11-27 VITALS — DIASTOLIC BLOOD PRESSURE: 74 MMHG | SYSTOLIC BLOOD PRESSURE: 117 MMHG
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
## 2021-11-27 DIAGNOSIS — Z91.041: ICD-10-CM

## 2021-11-27 DIAGNOSIS — M19.90: ICD-10-CM

## 2021-11-27 DIAGNOSIS — K21.9: ICD-10-CM

## 2021-11-27 DIAGNOSIS — Z85.038: ICD-10-CM

## 2021-11-27 DIAGNOSIS — E78.5: ICD-10-CM

## 2021-11-27 DIAGNOSIS — Z88.0: ICD-10-CM

## 2021-11-27 DIAGNOSIS — F32.9: ICD-10-CM
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
## 2021-11-27 DIAGNOSIS — Z88.8: ICD-10-CM

## 2021-11-27 DIAGNOSIS — D62: ICD-10-CM

## 2021-11-27 DIAGNOSIS — Z88.6: ICD-10-CM

## 2021-11-27 DIAGNOSIS — K59.00: ICD-10-CM

## 2021-11-27 DIAGNOSIS — F03.90: ICD-10-CM

## 2021-11-27 DIAGNOSIS — Z79.899: ICD-10-CM

## 2021-11-27 DIAGNOSIS — F41.9: ICD-10-CM

## 2021-11-27 DIAGNOSIS — S72.141A: Primary | ICD-10-CM

## 2021-11-27 LAB
APTT BLD: 28.8 SECONDS (ref 25.9–37)
BUN SERPL-MCNC: 21 MG/DL (ref 7–18)
CALCIUM SERPL-MCNC: 9 MG/DL (ref 8.8–10.2)
CHLORIDE SERPL-SCNC: 108 MEQ/L (ref 98–107)
CO2 SERPL-SCNC: 27 MEQ/L (ref 21–32)
CREAT SERPL-MCNC: 0.87 MG/DL (ref 0.55–1.3)
GFR SERPL CREATININE-BSD FRML MDRD: > 60 ML/MIN/{1.73_M2} (ref 32–?)
GLUCOSE SERPL-MCNC: 98 MG/DL (ref 70–100)
HCT VFR BLD AUTO: 40 % (ref 36–47)
HGB BLD-MCNC: 13.2 G/DL (ref 12–15.5)
INR PPP: 1.03
MCH RBC QN AUTO: 31.7 PG (ref 27–33)
MCHC RBC AUTO-ENTMCNC: 33 G/DL (ref 32–36.5)
MCV RBC AUTO: 95.9 FL (ref 80–96)
PLATELET # BLD AUTO: 164 10^3/UL (ref 150–450)
POTASSIUM SERPL-SCNC: 4 MEQ/L (ref 3.5–5.1)
PROTHROMBIN TIME: 13.9 SECONDS (ref 12.7–14.5)
RBC # BLD AUTO: 4.17 10^6/UL (ref 4–5.4)
RSV RNA NPH QL NAA+PROBE: NEGATIVE
SODIUM SERPL-SCNC: 143 MEQ/L (ref 136–145)
WBC # BLD AUTO: 6.1 10^3/UL (ref 4–10)

## 2021-11-27 PROCEDURE — 0QS606Z REPOSITION RIGHT UPPER FEMUR WITH INTRAMEDULLARY INTERNAL FIXATION DEVICE, OPEN APPROACH: ICD-10-PCS | Performed by: ORTHOPAEDIC SURGERY

## 2021-11-27 RX ADMIN — ATORVASTATIN CALCIUM SCH MG: 10 TABLET, FILM COATED ORAL at 20:39

## 2021-11-27 RX ADMIN — MEMANTINE SCH MG: 5 TABLET ORAL at 09:00

## 2021-11-27 RX ADMIN — ACETAMINOPHEN PRN MG: 325 TABLET ORAL at 20:39

## 2021-11-27 RX ADMIN — CYANOCOBALAMIN TAB 500 MCG SCH MCG: 500 TAB at 20:39

## 2021-11-27 RX ADMIN — CITALOPRAM HYDROBROMIDE SCH MG: 10 TABLET ORAL at 09:00

## 2021-11-27 RX ADMIN — MEMANTINE SCH MG: 5 TABLET ORAL at 20:39

## 2021-11-27 NOTE — CCD
Continuity of Care Document (CCD)

                             Created on: 2021



Elvia Pendleton

External Reference #: [id number]

: 1935

Sex: Female



Demographics





                          Address                   [id number] [place] [address] 

[place]  [address]

 

                          Home Phone                [phone]

 

                          Preferred Language        Unknown

 

                          Marital Status            Unknown

 

                          Jehovah's witness Affiliation     Unknown

 

                          Race                      White

 

                          Ethnic Group              Not  or 





Author





                          Author                    Elvia PITT DPM

 

                          Organization              Unknown

 

                          Address                   [address] [place] [address]  [id number]



 

                          Phone                     [phone]







Care Team Providers





                    Care Team Member Name Role                Phone

 

                    Brooklyn Flores                Unavailable







Problems





                    Active Problems     Provider            Date

 

                    Mild major depression                     Onset: 

 

                    Mixed hyperlipidemia                     Onset: 

 

                    Memory impairment                       Onset: 







Social History





                Type            Date            Description     Comments

 

                Birth Sex                       Unknown          

 

                Tobacco Use     Start: Unknown  Never Smoked Cigarettes  

 

                Tobacco Use     Start: Unknown  Never Smoked Cigars  

 

                Tobacco Use     Start: Unknown  Never Smoked A Pipe  

 

                Tobacco Use     Start: Unknown  Never Used Smokeless Tobacco  

 

                Tobacco Use     Start: Unknown  Patient has never smoked  







Allergies and adverse reactions





                                        Description

 

                                        No Known Drug Allergies







Medications





           Active Medications SIG        Qnty       Indications Ordering Provide

r Date

 

                          Calcium 600+D                     600-400mg-Unit Table

ts                   1 by 

mouth twice a day                                 Unknown         

 

                          Prevagen Extra Strength                     20mg Capsu

les                   take

one capsule by mouth daily                                 Unknown         

 

                          Memantine HCL                     10mg Tablets        

           take 1 tablet 

by mouth twice a day                                 Unknown         

 

                                        Fluticasone Propionate                  

   50mcg/Act Suspension                 

                                                    Unknown      

 

           Omeprazole                     40mg Capsules DR                      

                              Unknown    



 

                          Diclofenac Sodium                     1% Gel          

         apply 3 grams to 

painful area of left elbow bid prn                                 Unknown      

   

 

                          Citalopram Hydrobromide                     10mg Table

ts                   1 

tablet by mouth daily.                                 Unknown         

0

 

                                        Vitamin D3 Super Strength               

      50mcg (2000 Ut) Capsules          

             1 by mouth every day                           Unknown      

000

 

                          Vitamin B12                     1000mcg Tablets ER    

               1 by mouth 

every day                                       Unknown         

 

                          Atorvastatin Calcium                     10mg Tablets 

                  1 by 

mouth every day                                 Unknown         

 

                          Tylenol                     325mg Capsules            

       1-2 tabs daily as 

needed                                          Unknown         







Immunizations





                                        Description

 

                                        No Information Available







Vital Signs





                                        Description

 

                                        No Information Available







Results





                                        Description

 

                                        No Information Available







Procedures





                Date            Code            Description     Status

 

                2021      49966           Office/Outpatient [facility] 15-

29 Minutes Completed

 

                2021      33590           Debridement Nails Any Method 1-5

 Completed







Medical Devices





                                        Description

 

                                        No Information Available







Encounters





           Type       Date       Location   Provider   Dx         Diagnosis

 

           Office Visit 2021  2:30p [facility] Erwin Pitt DPM L60.0   

   Ingrowing 

nail

 

                          M79.674                   Pain in right toe(s)







Assessments





                Date            Code            Description     Provider

 

                2021      L60.0           Ingrowing nail  Erwin Pitt DPM

 

                2021      M79.674         Pain in right toe(s) Erwin Pitt DPM







Plan of Treatment





No Information Available



Functional Status





                                        Description

 

                                        No Information Available







Mental Status





                                        Description

 

                                        No Information Available







Referrals





                                        Description

 

                                        No Information Available

## 2021-11-27 NOTE — CCD
Continuity of Care Document (CCD)

                             Created on: 10/25/2021



Helder, Elviabar Quintana

External Reference #: [id number]

: 1935

Sex: Female



Demographics





                          Address                   [address] [place]  [address]

 

                          Home Phone                [phone]

 

                          Preferred Language        Unknown

 

                          Marital Status            

 

                          Baptism Affiliation     Unknown

 

                          Race                      White

 

                          Ethnic Group              Declined to Specify/Unknown





Author





                          Author                    Elvia TOLENTINO

 

                          Organization              Unknown

 

                          Address                    [place]  [address]



 

                          Phone                     [phone]







Care Team Providers





                    Care Team Member Name Role                Phone

 

                    Brooklyn Flores D.O. AUTM                [phone]

 

                    KALYAN Bartholomew M.D. AUTM                [phone]

 

                    [facility] AUTM                [phone]

 

                    [facility] AUTM     

           [phone]

 

                    Omer Gutierrez M.D. AUTM                [phone]







Problems





                    Active Problems     Provider            Date

 

                    H/O: peptic ulcer   ELMER Kan Onset: 2019

 

                    Mixed hyperlipidemia ELMER Kan Onset: 2019

 

                    Mild recurrent major depression ELMER Kan Onset: 0

2019

 

                    Dementia            ELMER Kan Onset: 10/10/2019

 

                    Gastroesophageal reflux disease ELMER Kan Onset: 1

0/10/2019







Social History





                Type            Date            Description     Comments

 

                Birth Sex                       Unknown          

 

                ETOH Use                        Denies alcohol use  

 

                Tobacco Use     Start: Unknown  Patient has never smoked  

 

                Recreational Drug Use                 Denies Drug Use  

 

                Smoking Status  Reviewed: 21 Patient has never smoked  

 

                Exercise Type/Frequency                 Does not exercise  

 

                Sun Exposure                    Does not use sunscreen  

 

                Seat Belt/Car Seat                 Always uses seat belt  







Allergies and adverse reactions





             Active Allergies Criticality  Reaction | Severity Comments     Date

 

             Iodine       Unable to assess criticality                          

 2018







Medications





           Active Medications SIG        Qnty       Indications Ordering Provide

r Date

 

                          Bactrim DS                     800-160mg Tablets      

             take one 

tablet by mouth every 12 hours for ten days 20tabs              R35.0           

    PETRONA Palacios.O.                                    10/15/2021

 

                                        Calcium 600+D High Potency              

       600-400mg-Unit Tablets           

             1 by mouth twice a day 180tabs                   Brooklyn sherman, D.O. 2021

 

                          Prevagen Extra Strength                     20mg Capsu

les                   take

one capsule by mouth daily. 90caps                          PETRONA Palacios.O. 2021

 

                          Memantine HCL                     10mg Tablets        

           Take One Tablet

By Mouth Twice A Day 180tabs         F03.90          PETRONA Palacios.O. 

10/07/2020

 

                                        Fluticasone Propionate Nasal Spray      

               50mcg/Act Suspension     

                two sprays each nostril once daily 29.7ml          J06.9        

   PETRONA Yee.O.                            2020

 

                          Omeprazole                     40mg Capsules DR       

            take one 

capsule by mouth every day 90caps                          PETRONA Palacios.O. 2019

 

                          Diclofenac Sodium                     1% Gel          

         apply 3 grams to 

painful area of left elbow twice a day as needed 100units            M79.642    

         PETRONA Yee.O.                            04/10/2019

 

                          Citalopram Hydrobromide                     10mg Table

ts                   Take 

One Tablet By Mouth Every Day 90tabs                          Brooklyn sherman, D.O. 

 

                          Vitamin D3                     2000Unit Capsules      

             1 by mouth 

every day                                       Unknown         

 

                          Vitamin B-12                     1000mcg Tablets      

             1 by mouth 

every day                                       Unknown         

 

                          Atorvastatin Calcium                     10mg Tablets 

                  Take One

Tablet By Mouth Every Evening 90tabs                          Brooklyn sherman, D.O. 

 

                          Tylenol                     325mg Tablets             

      take 1-2 tabs daily 

as needed                                       Unknown         

 

                                        History Medications

 

                                        Tetanus/Diphtheria Toxoids-Adsorbed Adul

t                     2-2LF/0.5ML 

Suspension                   administer tetanus with pertussis at pharmacy. 5ml 

                

                          PETRONA Palacios.O. 2021 - 2021







Medications Administered in Office





           Medication SIG        Qnty       Indications Ordering Provider Date

 

                          Covid-19 vaccine, Unspecified                      Inj

ection                    

                                                Unknown         10/24/2021

 

                          Covid-19 vaccine, Unspecified                      Inj

ection                    

                                                Unknown         2021

 

                          Covid-19 vaccine, Unspecified                      Inj

ection                    

                                                Unknown         2021







Immunizations





             CPT Code     Status       Date         Vaccine      Lot #

 

                [id number]           Given           2021      Tetanus, Diphthe

aixa Toxoids/Acellular Pertussis Vaccine 7

Or >                                    [id number]

 

             [id number]        Given        2020   Pneumococcal Vaccine 23 [address]

nt 2Yrs Or Older  

 

                [id number]           Given           2019      Pneumococcal Con

jugate Vaccine 13 Valent For 

Intramuscular Use                        

 

             U-Flu        Given        2019   Influenza,Unspecified  







Vital Signs





                Date            Vital           Result          Comment

 

                10/15/2021  2:11pm BP Systolic     92 mmHg          

 

                    BP Diastolic        58 mmHg              

 

                    Height              61.7 inches         5'1.70"

 

                    Heart Rate          80 /min              

 

                    Respiratory Rate    12 /min              

 

                    Body Temperature    98.5 F             

 

                    O2 % BldC Oximetry  95 %                 

 

                    Ideal Body Weight   105 lb               

 

                2021 10:03am BP Systolic     124 mmHg         

 

                    BP Diastolic        74 mmHg              

 

                    Height              61.7 inches         5'1.70"

 

                    Weight              144.25 lb            

 

                    BMI (Body Mass Index) 26.6 kg/m2           

 

                    Heart Rate          74 /min              

 

                    Respiratory Rate    18 /min              

 

                    Body Temperature    97.8 F             

 

                    O2 % BldC Oximetry  95 %                 

 

                    Ideal Body Weight   105 lb               







Results





        Test    Acquired Date Facility Test    Result  H/L     Range   Note

 

                    Comprehensive Metabolic Profil 10/21/2021          [facility]

[facility]ient Testing (Registration)

                                        [address] [place] [address] [phone] Glucose, Fasting 103 mg/dL  High             

 

             Blood Urea Nitrogen 16 mg/dL     Normal       7-18          

 

             Creatinine For GFR 0.92 mg/dL   Normal       0.55-1.30     

 

             Glomerular Filtration Rate > 60.0       Normal       >32          1

 

             Sodium Level 139 mEq/L    Normal       136-145       

 

             Potassium Serum 4.1 mEq/L    Normal       3.5-5.1       

 

             Chloride Level 106 mEq/L    Normal               

 

             Carbon Dioxide Level 27 mEq/L     Normal       21-32         

 

             Anion Gap    6 mEq/L      Low          8-16          

 

             Calcium Level 8.7 mg/dL    Low          8.8-10.2      

 

             Ast/Sgot     21 U/L       Normal       7-37          

 

             Alt/SGPT     24 U/L       Normal       12-78         

 

             Alkaline Phosphatase 96 U/L       Normal               

 

             Bilirubin,Total 0.4 mg/dL    Normal       0.2-1.0       

 

             Total Protein 6.3 GM/DL    Low          6.4-8.2       

 

             Albumin      3.3 GM/DL    Normal       3.2-5.2       

 

             Albumin/Globulin Ratio 1.1          Low          1.2-2.2       

 

                    Influenza A/B RSV Covid Amp 10/21/2021          [facility]

nt Testing (Registration)

                                        [address] [place] [address] [phone] Influenza A Amplification NEGATIVE   Normal     Negati

ve   2

 

             Influenza B Amplification NEGATIVE     Normal       Negative     3

 

             RSV Amplification NEGATIVE     Normal       Negative     4

 

             Sars Covid-19 Amplification NEGATIVE     Normal       Negative     

5

 

                    CBC With Differential 10/21/2021          [facility] [place]

ting (Registration)

                                        [address] [place] [address] [phone] White Blood Count 4.9 10     Normal     4.0-10.0    

 

             Red Blood Count 4.13 10      Normal       4.00-5.40     

 

             Hemoglobin   12.8 g/dL    Normal       12.0-15.5     

 

             Hematocrit   38.3 %       Normal       36.0-47.0     

 

             Mean Corpuscular Volume 92.7 fl      Normal       80.0-96.0     

 

             Mean Corpuscular Hemoglobin 31.0 pg      Normal       27.0-33.0    

 

 

             Mean Corpuscular HGB Conc 33.4 g/dL    Normal       32.0-36.5     

 

             Red Cell Distribution Width 13.0 %       Normal       11.5-14.5    

 

 

             Platelet Count, Automated 162 10       Normal       150-450       

 

             Nucleated Red Blood Cell % 0.0 %        Normal       0-0           

 

                    Differential        10/21/2021          [facility]

ng (Registration)

                                        [address] [place], [place] [id number] [phone] Neutrophils 57 %       Normal     28-66       

 

             Lymphocytes  25 %         Normal       16-44         

 

             Monocytes    3 %          Normal       0-5           

 

             Eosinophils  4 %          High         0-3           

 

             Atypical Lymph 11 %         High         0-5           

 

             Hypochromasia 1+           Normal                     

 

                    Laboratory test finding 10/21/2021          [facility]

esting (Registration)

                                        [address] [place] [address] [phone] Platelet Estimate DECREASED  Normal     Normal      

 

                    Ua Routine          10/21/2021          [facility]

ng (Registration)

                                        [address] [place] [address] [phone] Appearance, Urine HAZY       Normal     Clear       

 

             Color, Urine YELLOW       Normal       Yellow        

 

             PH,Urine     5.0 units    Normal       5.0-9.0       

 

             Specific Gravity Urine Auto 1.013        Normal       1.002-1.035  

 

 

             Protein, Urine Auto NEGATIVE mg/dL Normal       Negative      

 

             Glucose, Urine (Ua) Auto NEGATIVE mg/dL Normal       Negative      

 

             Ketone, Urine Auto TRACE mg/dL  High         Negative      

 

             Urobilinogen, Urine Auto 0.2 mg/dL    Normal       0.0-2.0       

 

             Bilirubin, Urine Auto NEGATIVE     Normal       Negative      

 

             Nitrite, Urine Auto NEGATIVE     Normal       Negative      

 

             Leukocyte Esterase, Urine Auto NEGATIVE     Normal       Negative  

    

 

             Blood, Urine Blood 1+           High         Negative      

 

             WBC, Urine Auto 0 /HPF       Normal       0-3           

 

             RBC, Urine Auto 0 /HPF       Normal       0-3           

 

             Bacteria, Urine Auto NEGATIVE     Normal       Negative      

 

             Squamous Epithelial Cell Ur AU 8 /HPF       Normal       0-6       

    

 

             Hyaline Cast, Urine Auto 0 /LPF       Normal       0-1           

 

                    Ua Routine          10/19/2021          [facility]

ng (Registration)

                                        [address] [place] [id number] [phone] Appearance, Urine HAZY       Normal     Clear       

 

             Color, Urine YELLOW       Normal       Yellow        

 

             PH,Urine     5.0 units    Normal       5.0-9.0       

 

             Specific Gravity Urine Auto 1.018        Normal       1.002-1.035  

 

 

             Protein, Urine Auto NEGATIVE mg/dL Normal       Negative      

 

             Glucose, Urine (Ua) Auto NEGATIVE mg/dL Normal       Negative      

 

             Ketone, Urine Auto NEGATIVE mg/dL Normal       Negative      

 

             Urobilinogen, Urine Auto 2.0 mg/dL    High         0.0-2.0       

 

             Bilirubin, Urine Auto NEGATIVE     Normal       Negative      

 

             Nitrite, Urine Auto NEGATIVE     Normal       Negative      

 

             Leukocyte Esterase, Urine Auto NEGATIVE     Normal       Negative  

    

 

             Blood, Urine Blood 1+           High         Negative      

 

             WBC, Urine Auto 2 /HPF       Normal       0-3           

 

             RBC, Urine Auto 7 /HPF       High         0-3           

 

             Bacteria, Urine Auto NEGATIVE     Normal       Negative      

 

             Squamous Epithelial Cell Ur AU 3 /HPF       Normal       0-6       

    

 

             Mucus, Urine SMALL        Normal       Negative      

 

             Hyaline Cast, Urine Auto 0 /LPF       Normal       0-1           

 

                    Comprehensive Metabolic Profil 10/19/2021          [facility]

tient Testing (Registration)

                                        [address] [place] [address]

           [phone] Glucose, Fasting 79 mg/dL   Normal           

 

             Blood Urea Nitrogen 18 mg/dL     Normal       7-18          

 

             Creatinine For GFR 0.90 mg/dL   Normal       0.55-1.30     

 

             Glomerular Filtration Rate > 60.0       Normal       >32          6

 

             Sodium Level 139 mEq/L    Normal       136-145       

 

             Potassium Serum 4.3 mEq/L    Normal       3.5-5.1       

 

             Chloride Level 108 mEq/L    High                 

 

             Carbon Dioxide Level 27 mEq/L     Normal       21-32         

 

             Anion Gap    4 mEq/L      Low          8-16          

 

             Calcium Level 9.0 mg/dL    Normal       8.8-10.2      

 

             Ast/Sgot     18 U/L       Normal       7-37          

 

             Alt/SGPT     22 U/L       Normal       12-78         

 

             Alkaline Phosphatase 85 U/L       Normal               

 

             Bilirubin,Total 0.3 mg/dL    Normal       0.2-1.0       

 

             Total Protein 6.0 GM/DL    Low          6.4-8.2       

 

             Albumin      3.3 GM/DL    Normal       3.2-5.2       

 

             Albumin/Globulin Ratio 1.2          Normal       1.2-2.2       

 

                    CBC With Differential 10/19/2021          [facility] [place]

ting (Registration)

                                        [address] [place] [address] [phone] White Blood Count 4.5 10     Normal     4.0-10.0    

 

             Red Blood Count 4.05 10      Normal       4.00-5.40     

 

             Hemoglobin   12.7 g/dL    Normal       12.0-15.5     

 

             Hematocrit   38.1 %       Normal       36.0-47.0     

 

             Mean Corpuscular Volume 94.1 fl      Normal       80.0-96.0     

 

             Mean Corpuscular Hemoglobin 31.4 pg      Normal       27.0-33.0    

 

 

             Mean Corpuscular HGB Conc 33.3 g/dL    Normal       32.0-36.5     

 

             Red Cell Distribution Width 13.0 %       Normal       11.5-14.5    

 

 

             Platelet Count, Automated 147 10       Low          150-450       

 

             Neutrophils % 60.3 %       Normal       36.0-66.0     

 

             Lymph %      24.1 %       Normal       24.0-44.0     

 

             Mono %       9.5 %        High         2.0-8.0       

 

             Eos %        5.3 %        High         0.0-3.0       

 

             Baso %       0.4 %        Normal       0.0-1.0       

 

             Immature Granulocyte % 0.4 %        Normal       0-3.0         

 

             Nucleated Red Blood Cell % 0.0 %        Normal       0-0           

 

             Neutrophils # 2.7 10       Normal       1.5-8.5       

 

             Lymph #      1.1 10       Low          1.5-5.0       

 

             Mono #       0.4 10       Normal       0.0-0.8       

 

             Eos #        0.2 10       Normal       0.0-0.5       

 

             Baso #       0.0 10       Normal       0.0-0.2       

 

                    Laboratory test finding 10/19/2021          [facility] T

esting (Registration)

                                        [address] [place] [address] [phone] C Reactive Protein Quantitativ 3.08 mg/dL High       0

.00-0.30   







                          1                         Units are mL/min/1.73 m2



Chronic Kidney Disease Staging per [facility]:



Stage I & II   GFR >=60       Normal to Mildly Decreased

Stage III      GFR 30-59      Moderately Decreased

Stage IV       GFR 15-29      Severely Decreased

Stage V        GFR <15        Very Little GFR Left

ESRD           GFR <15 on RRT



 

                          2                         Negative results do not prec

lude influenza or RSV virus

infection and should not be used as the sole basis for

treatment or other patient management decisions.



 

                          3                         Negative results do not prec

lude influenza or RSV virus

infection and should not be used as the sole basis for

treatment or other patient management decisions.



 

                          4                         Negative results do not prec

lude influenza or RSV virus

infection and should not be used as the sole basis for

treatment or other patient management decisions.



 

                          5                         A false negative result may 

occur if a specimen is

improperly collected, transported or handled. False negative

results may also occur if inadequate numbers of organisms

are present in the specimen.  As with any molecular test,

mutations within the target regions of Xpert Xpress

SARS-CoV-2 could affect primer and/or probe binding

resulting in failure to detect the presence of virus.  This

test cannot rule out diseases caused by other bacterial or

viral pathogens.



DISCLAIMER:

Testing was performed using the [employer] SARS-CoV-2 test.

This test was developed and its performance characteristics

determined by [employer]. This test has not been FDA cleared or

approved. This test has been authorized by FDA under an

Emergency Use Authorization (EUA). This test is only

authorized for the duration of time the declaration that

circumstances exist justifying the authorization of the

emergency use of in vitro diagnostic tests for detection of

SARS-CoV-2 virus and/or diagnosis of COVID-19 infection

under section 564(b)(1) of the Act, 21 U.S.C.

                                        360bbb-3(b)(1), unless the authorization

 is terminated or

revoked sooner.



 

                          6                         Units are mL/min/1.73 m2



Chronic Kidney Disease Staging per [facility]:



Stage I & II   GFR >=60       Normal to Mildly Decreased

Stage III      GFR 30-59      Moderately Decreased

Stage IV       GFR 15-29      Severely Decreased

Stage V        GFR <15        Very Little GFR Left

ESRD           GFR <15 on RRT









Procedures





                Date            Code            Description     Status

 

                10/15/2021      66587           Office/Outpatient Established Lo

w MDM 20-29 Min Completed

 

                2021      18443           Office/Outpatient Established Mo

d MDM 30-39 Min Completed

 

                2021      32300           Office/Outpatient Established Lo

w MDM 20-29 Min Completed

 

                2021      71112           Office/Outpatient Established Lo

w MDM 20-29 Min Completed

 

                2020      [id number]        Mammogram       Completed

 

                2019      [id number]        Mammogram       Completed







Medical Devices





                                        Description

 

                                        No Information Available







Encounters





           Type       Date       Location   Provider   Dx         Diagnosis

 

                Office Visit    10/15/2021  2:00p [facility] ELMER Kan               R42                       Dizziness and giddiness

 

                          R35.0                     Frequency of micturition

 

                Office Visit    2021 10:00a [facility] EL[facility] Kan               F33.0                     Major depressive disorder, r

ecurrent, mild

 

                          E78.2                     Mixed hyperlipidemia

 

                          K21.9                     Gastro-esophageal reflux dis

ease without esophagitis

 

                          F03.90                    Unspecified dementia without

 behavioral disturbance

 

                          Z87.11                    Personal history of peptic u

lcer disease

 

                          Z79.899                   Other long term (current) dr roberts therapy

 

                Office Visit    2021  8:30a [facility] ELMER Kan               Z48.02                    Encounter for removal of sut

ures

 

                          W10.8xxA                  Fall (on) (from) other stair

s and steps, initial encounter

 

                          S00.83xA                  Contusion of other part of h

ead, initial encounter

 

                          Z23                       Encounter for immunization

 

                Office Visit    2021  1:30p [facility] ELMER Kan               S01.81xA                  Laceration w/o foreign body 

of oth part of head, init 

encntr

 

                          W10.8xxA                  Fall (on) (from) other stair

s and steps, initial encounter

 

                          S00.83xA                  Contusion of other part of h

ead, initial encounter







Assessments





                Date            Code            Description     Provider

 

                10/15/2021      R42             Dizziness and giddiness ELMER Kan

 

                10/15/2021      R35.0           Frequency of micturition ELMER Kan

 

                2021      F33.0           Major depressive disorder, recur

rent, mild ELMER Kna

 

                2021      E78.2           Mixed hyperlipidemia ELMER Ramos

 

                2021      K21.9           Gastro-esophageal reflux disease

 without esophagitis ELMER Kan

 

                2021      F03.90          Unspecified dementia without beh

avioral disturbance ELMER Kan

 

                2021      Z87.11          Personal history of peptic ulcer

 disease ELMER Kan

 

                2021      Z79.899         Other long term (current) drug t

herapy ELMER Kan

 

                2021      Z48.02          Encounter for removal of sutures

 ELMER Kan

 

                2021      W10.8xxA        Fall (on) (from) other stairs an

d steps, initial encounter 

ELMER Kan

 

                2021      S00.83xA        Contusion of other part of head,

 initial encounter ELMER Kan

 

                2021      Z23             Encounter for immunization Ramiro

ELMER Morel

 

                    2021          S01.81xA            Laceration without f

oreign body of other part of head, 

initial encounter                       ELMER Kan

 

                2021      W10.8xxA        Fall (on) (from) other stairs an

d steps, initial encounter 

ELMER Kan

 

                2021      S00.83xA        Contusion of other part of head,

 initial encounter ELMER Kan







Plan of Treatment

Future Appointment(s):* 10/26/2021  2:40 pm - ELMER Kan at [facility]

* 2022 10:00 am - ELMER Kan at [facility]





Functional Status





                                        Description

 

                                        No Information Available







Mental Status





                                        Description

 

                                        No Information Available







Referrals





                                        Description

 

                                        No Information Available

## 2021-11-27 NOTE — REP
INDICATION:

trauma



COMPARISON:

09/28/2016



TECHNIQUE:

Portable AP view of the chest



FINDINGS:

The mediastinum and cardiac silhouette are stable and within normal limits for

portable technique.  The lung fields are clear without acute consolidation, effusion,

or pneumothorax.  Skeletal structures are intact.



IMPRESSION:

No acute cardiopulmonary process appreciated.





<Electronically signed by Nitin Velazquez > 11/27/21 0915

## 2021-11-27 NOTE — CCD
Continuity of Care Document (CCD)

                             Created on: 10/27/2021



Helder, Elviabar Quintana

External Reference #: [id number]

: 1935

Sex: Female



Demographics





                          Address                   [address] [place]  [address]

 

                          Home Phone                [phone]

 

                          Preferred Language        Unknown

 

                          Marital Status            

 

                          Mormonism Affiliation     Unknown

 

                          Race                      White

 

                          Ethnic Group              Declined to Specify/Unknown





Author





                          Author                    Elvia TOLENTINO

 

                          Organization              Unknown

 

                          [place]  [address]



 

                          Phone                     [phone]







Care Team Providers





                    Care Team Member Name Role                Phone

 

                    Brooklyn Flores D.O. AUTM                [phone]

 

                    KALYAN Bartholomew M.D. AUTM                [phone]

 

                    [facility] AUTM                [phone]

 

                    [facility] AUTM     

           [phone]

 

                    Omer Gutierrez M.D. AUTM                [phone]







Problems





                    Active Problems     Provider            Date

 

                    H/O: peptic ulcer   ELMER Kan Onset: 2019

 

                    Mixed hyperlipidemia ELMER Kan Onset: 2019

 

                    Mild recurrent major depression ELMER Kan Onset: 0

2019

 

                    Dementia            ELMER Kan Onset: 10/10/2019

 

                    Gastroesophageal reflux disease ELMER Kan Onset: 1

0/10/2019







Social History





                Type            Date            Description     Comments

 

                Birth Sex                       Unknown          

 

                ETOH Use                        Denies alcohol use  

 

                Tobacco Use     Start: Unknown  Patient has never smoked  

 

                Recreational Drug Use                 Denies Drug Use  

 

                Smoking Status  Reviewed: 21 Patient has never smoked  

 

                Exercise Type/Frequency                 Does not exercise  

 

                Sun Exposure                    Does not use sunscreen  

 

                Seat Belt/Car Seat                 Always uses seat belt  







Allergies and adverse reactions





             Active Allergies Criticality  Reaction | Severity Comments     Date

 

             Iodine       Unable to assess criticality                          

 2018







Medications





           Active Medications SIG        Qnty       Indications Ordering Provide

r Date

 

                                        Calcium 600+D High Potency              

       600-400mg-Unit Tablets           

             1 by mouth twice a day 180tabs                   KALYAN KinneyO. 2021

 

                          Prevagen Extra Strength                     20mg Capsu

les                   take

one capsule by mouth daily. 90caps                          KALYAN PalaciosO. 2021

 

                          Memantine HCL                     10mg Tablets        

           Take One Tablet

By Mouth Twice A Day 180tabs         F03.90          PETRONA Palacios.O. 

10/07/2020

 

                                        Fluticasone Propionate Nasal Spray      

               50mcg/Act Suspension     

                two sprays each nostril once daily 29.7ml          J06.9        

   PETRONA Yee.O.                            2020

 

                          Omeprazole                     40mg Capsules DR       

            take one 

capsule by mouth every day 90caps                          PETRONA Palacios.O. 2019

 

                          Diclofenac Sodium                     1% Gel          

         apply 3 grams to 

painful area of left elbow twice a day as needed 100units            M79.642    

         PETRONA Yee.O.                            04/10/2019

 

                          Citalopram Hydrobromide                     10mg Table

ts                   Take 

One Tablet By Mouth Every Day 90tabs                          Brooklyn sherman, D.O. 

 

                          Vitamin D3                     2000Unit Capsules      

             1 by mouth 

every day                                       Unknown         

 

                          Vitamin B-12                     1000mcg Tablets      

             1 by mouth 

every day                                       Unknown         

 

                          Atorvastatin Calcium                     10mg Tablets 

                  Take One

Tablet By Mouth Every Evening 90tabs                          Brooklyn sherman, D.O. 

 

                          Tylenol                     325mg Tablets             

      take 1-2 tabs daily 

as needed                                       Unknown         

 

                                        History Medications

 

                          Bactrim DS                     800-160mg Tablets      

             take one 

tablet by mouth every 12 hours for ten days 20tabs              R35.0           

    PETRONA Palacios.O.                                    10/15/2021 - 10/26/2021

 

                                        Tetanus/Diphtheria Toxoids-Adsorbed Adul

t                     2-2LF/0.5ML 

Suspension                   administer tetanus with pertussis at pharmacy. 5ml 

                

                          PETRONA Palacios.O. 2021 - 2021







Medications Administered in Office





           Medication SIG        Qnty       Indications Ordering Provider Date

 

                          Covid-19 vaccine, Unspecified                      Inj

ection                    

                                                Unknown         10/24/2021

 

                          Covid-19 vaccine, Unspecified                      Inj

ection                    

                                                Unknown         2021

 

                          Covid-19 vaccine, Unspecified                      Inj

ection                    

                                                Unknown         2021







Immunizations





             CPT Code     Status       Date         Vaccine      Lot #

 

                [id number]           Given           2021      Tetanus, Diphthe

aixa Toxoids/Acellular Pertussis Vaccine 7

Or >                                    [id number]

 

             [id number]        Given        2020   Pneumococcal Vaccine 23 [address]

nt 2Yrs Or Older  

 

                [id number]           Given           2019      Pneumococcal Con

jugate Vaccine 13 Valent For 

Intramuscular Use                        

 

             U-Flu        Given        2019   Influenza,Unspecified  







Vital Signs





                Date            Vital           Result          Comment

 

                10/26/2021  2:48pm BP Systolic     124 mmHg         

 

                    BP Diastolic        72 mmHg              

 

                    Height              61.7 inches         5'1.70"

 

                    Weight              134.00 lb            

 

                    BMI (Body Mass Index) 24.7 kg/m2           

 

                    Heart Rate          81 /min              

 

                    Respiratory Rate    18 /min              

 

                    Body Temperature    98.8 F             

 

                    O2 % BldC Oximetry  100 %                

 

                    Ideal Body Weight   105 lb               

 

                10/15/2021  2:11pm BP Systolic     92 mmHg          

 

                    BP Diastolic        58 mmHg              

 

                    Height              61.7 inches         5'1.70"

 

                    Heart Rate          80 /min              

 

                    Respiratory Rate    12 /min              

 

                    Body Temperature    98.5 F             

 

                    O2 % BldC Oximetry  95 %                 

 

                    Ideal Body Weight   105 lb               







Results





        Test    Acquired Date Facility Test    Result  H/L     Range   Note

 

                    Comprehensive Metabolic Profil 10/21/2021          [facility]

[facility] Testing (Registration)

                                        [address] [place] [address] [phone] Glucose, Fasting 103 mg/dL  High             

 

             Blood Urea Nitrogen 16 mg/dL     Normal       7-18          

 

             Creatinine For GFR 0.92 mg/dL   Normal       0.55-1.30     

 

             Glomerular Filtration Rate > 60.0       Normal       >32          1

 

             Sodium Level 139 mEq/L    Normal       136-145       

 

             Potassium Serum 4.1 mEq/L    Normal       3.5-5.1       

 

             Chloride Level 106 mEq/L    Normal               

 

             Carbon Dioxide Level 27 mEq/L     Normal       21-32         

 

             Anion Gap    6 mEq/L      Low          8-16          

 

             Calcium Level 8.7 mg/dL    Low          8.8-10.2      

 

             Ast/Sgot     21 U/L       Normal       7-37          

 

             Alt/SGPT     24 U/L       Normal       12-78         

 

             Alkaline Phosphatase 96 U/L       Normal               

 

             Bilirubin,Total 0.4 mg/dL    Normal       0.2-1.0       

 

             Total Protein 6.3 GM/DL    Low          6.4-8.2       

 

             Albumin      3.3 GM/DL    Normal       3.2-5.2       

 

             Albumin/Globulin Ratio 1.1          Low          1.2-2.2       

 

                    Influenza A/B RSV Covid Amp 10/21/2021          [facility]

nt Testing (Registration)

                                        [address] [place] [address] [phone] Influenza A Amplification NEGATIVE   Normal     Negati

ve   2

 

             Influenza B Amplification NEGATIVE     Normal       Negative     3

 

             RSV Amplification NEGATIVE     Normal       Negative     4

 

             Sars Covid-19 Amplification NEGATIVE     Normal       Negative     

5

 

                    CBC With Differential 10/21/2021          [facility] [place]

ting (Registration)

                                        [address] [place] [address] [phone] White Blood Count 4.9 10     Normal     4.0-10.0    

 

             Red Blood Count 4.13 10      Normal       4.00-5.40     

 

             Hemoglobin   12.8 g/dL    Normal       12.0-15.5     

 

             Hematocrit   38.3 %       Normal       36.0-47.0     

 

             Mean Corpuscular Volume 92.7 fl      Normal       80.0-96.0     

 

             Mean Corpuscular Hemoglobin 31.0 pg      Normal       27.0-33.0    

 

 

             Mean Corpuscular HGB Conc 33.4 g/dL    Normal       32.0-36.5     

 

             Red Cell Distribution Width 13.0 %       Normal       11.5-14.5    

 

 

             Platelet Count, Automated 162 10       Normal       150-450       

 

             Nucleated Red Blood Cell % 0.0 %        Normal       0-0           

 

                    Differential        10/21/2021          [facility]

ng (Registration)

                                        [address] [place], [place] [id number] [phone] Neutrophils 57 %       Normal     28-66       

 

             Lymphocytes  25 %         Normal       16-44         

 

             Monocytes    3 %          Normal       0-5           

 

             Eosinophils  4 %          High         0-3           

 

             Atypical Lymph 11 %         High         0-5           

 

             Hypochromasia 1+           Normal                     

 

                    Laboratory test finding 10/21/2021          [facility]

esting (Registration)

                                        [address] [place] [address] [phone] Platelet Estimate DECREASED  Normal     Normal      

 

                    Ua Routine          10/21/2021          [facility]

ng (Registration)

                                        [address] [place] [address] [phone] Appearance, Urine HAZY       Normal     Clear       

 

             Color, Urine YELLOW       Normal       Yellow        

 

             PH,Urine     5.0 units    Normal       5.0-9.0       

 

             Specific Gravity Urine Auto 1.013        Normal       1.002-1.035  

 

 

             Protein, Urine Auto NEGATIVE mg/dL Normal       Negative      

 

             Glucose, Urine (Ua) Auto NEGATIVE mg/dL Normal       Negative      

 

             Ketone, Urine Auto TRACE mg/dL  High         Negative      

 

             Urobilinogen, Urine Auto 0.2 mg/dL    Normal       0.0-2.0       

 

             Bilirubin, Urine Auto NEGATIVE     Normal       Negative      

 

             Nitrite, Urine Auto NEGATIVE     Normal       Negative      

 

             Leukocyte Esterase, Urine Auto NEGATIVE     Normal       Negative  

    

 

             Blood, Urine Blood 1+           High         Negative      

 

             WBC, Urine Auto 0 /HPF       Normal       0-3           

 

             RBC, Urine Auto 0 /HPF       Normal       0-3           

 

             Bacteria, Urine Auto NEGATIVE     Normal       Negative      

 

             Squamous Epithelial Cell Ur AU 8 /HPF       Normal       0-6       

    

 

             Hyaline Cast, Urine Auto 0 /LPF       Normal       0-1           

 

                    Ua Routine          10/19/2021          [facility]

ng (Registration)

                                        [address] [place] [address] [phone] Appearance, Urine HAZY       Normal     Clear       

 

             Color, Urine YELLOW       Normal       Yellow        

 

             PH,Urine     5.0 units    Normal       5.0-9.0       

 

             Specific Gravity Urine Auto 1.018        Normal       1.002-1.035  

 

 

             Protein, Urine Auto NEGATIVE mg/dL Normal       Negative      

 

             Glucose, Urine (Ua) Auto NEGATIVE mg/dL Normal       Negative      

 

             Ketone, Urine Auto NEGATIVE mg/dL Normal       Negative      

 

             Urobilinogen, Urine Auto 2.0 mg/dL    High         0.0-2.0       

 

             Bilirubin, Urine Auto NEGATIVE     Normal       Negative      

 

             Nitrite, Urine Auto NEGATIVE     Normal       Negative      

 

             Leukocyte Esterase, Urine Auto NEGATIVE     Normal       Negative  

    

 

             Blood, Urine Blood 1+           High         Negative      

 

             WBC, Urine Auto 2 /HPF       Normal       0-3           

 

             RBC, Urine Auto 7 /HPF       High         0-3           

 

             Bacteria, Urine Auto NEGATIVE     Normal       Negative      

 

             Squamous Epithelial Cell Ur AU 3 /HPF       Normal       0-6       

    

 

             Mucus, Urine SMALL        Normal       Negative      

 

             Hyaline Cast, Urine Auto 0 /LPF       Normal       0-1           

 

                    Comprehensive Metabolic Profil 10/19/2021          [facility]

tient Testing (Registration)

                                        [address] [place] [id number] [phone] Glucose, Fasting 79 mg/dL   Normal           

 

             Blood Urea Nitrogen 18 mg/dL     Normal       7-18          

 

             Creatinine For GFR 0.90 mg/dL   Normal       0.55-1.30     

 

             Glomerular Filtration Rate > 60.0       Normal       >32          6

 

             Sodium Level 139 mEq/L    Normal       136-145       

 

             Potassium Serum 4.3 mEq/L    Normal       3.5-5.1       

 

             Chloride Level 108 mEq/L    High                 

 

             Carbon Dioxide Level 27 mEq/L     Normal       21-32         

 

             Anion Gap    4 mEq/L      Low          8-16          

 

             Calcium Level 9.0 mg/dL    Normal       8.8-10.2      

 

             Ast/Sgot     18 U/L       Normal       7-37          

 

             Alt/SGPT     22 U/L       Normal       12-78         

 

             Alkaline Phosphatase 85 U/L       Normal               

 

             Bilirubin,Total 0.3 mg/dL    Normal       0.2-1.0       

 

             Total Protein 6.0 GM/DL    Low          6.4-8.2       

 

             Albumin      3.3 GM/DL    Normal       3.2-5.2       

 

             Albumin/Globulin Ratio 1.2          Normal       1.2-2.2       

 

                    CBC With Differential 10/19/2021          [facility] [place]

ting (Registration)

                                        [address] [place] [address] [phone] White Blood Count 4.5 10     Normal     4.0-10.0    

 

             Red Blood Count 4.05 10      Normal       4.00-5.40     

 

             Hemoglobin   12.7 g/dL    Normal       12.0-15.5     

 

             Hematocrit   38.1 %       Normal       36.0-47.0     

 

             Mean Corpuscular Volume 94.1 fl      Normal       80.0-96.0     

 

             Mean Corpuscular Hemoglobin 31.4 pg      Normal       27.0-33.0    

 

 

             Mean Corpuscular HGB Conc 33.3 g/dL    Normal       32.0-36.5     

 

             Red Cell Distribution Width 13.0 %       Normal       11.5-14.5    

 

 

             Platelet Count, Automated 147 10       Low          150-450       

 

             Neutrophils % 60.3 %       Normal       36.0-66.0     

 

             Lymph %      24.1 %       Normal       24.0-44.0     

 

             Mono %       9.5 %        High         2.0-8.0       

 

             Eos %        5.3 %        High         0.0-3.0       

 

             Baso %       0.4 %        Normal       0.0-1.0       

 

             Immature Granulocyte % 0.4 %        Normal       0-3.0         

 

             Nucleated Red Blood Cell % 0.0 %        Normal       0-0           

 

             Neutrophils # 2.7 10       Normal       1.5-8.5       

 

             Lymph #      1.1 10       Low          1.5-5.0       

 

             Mono #       0.4 10       Normal       0.0-0.8       

 

             Eos #        0.2 10       Normal       0.0-0.5       

 

             Baso #       0.0 10       Normal       0.0-0.2       

 

                    Laboratory test finding 10/19/2021          [facility]

esting (Registration)

                                        [address] [place] [address] [phone] C Reactive Protein Quantitativ 3.08 mg/dL High       0

.00-0.30   







                          1                         Units are mL/min/1.73 m2



Chronic Kidney Disease Staging per NKF:



Stage I & II   GFR >=60       Normal to Mildly Decreased

Stage III      GFR 30-59      Moderately Decreased

Stage IV       GFR 15-29      Severely Decreased

Stage V        GFR <15        Very Little GFR Left

ESRD           GFR <15 on RRT



 

                          2                         Negative results do not prec

lude influenza or RSV virus

infection and should not be used as the sole basis for

treatment or other patient management decisions.



 

                          3                         Negative results do not prec

lude influenza or RSV virus

infection and should not be used as the sole basis for

treatment or other patient management decisions.



 

                          4                         Negative results do not prec

lude influenza or RSV virus

infection and should not be used as the sole basis for

treatment or other patient management decisions.



 

                          5                         A false negative result may 

occur if a specimen is

improperly collected, transported or handled. False negative

results may also occur if inadequate numbers of organisms

are present in the specimen.  As with any molecular test,

mutations within the target regions of [employer]

SARS-CoV-2 could affect primer and/or probe binding

resulting in failure to detect the presence of virus.  This

test cannot rule out diseases caused by other bacterial or

viral pathogens.



DISCLAIMER:

Testing was performed using the [employer] SARS-CoV-2 test.

This test was developed and its performance characteristics

determined by [employer]. This test has not been FDA cleared or

approved. This test has been authorized by FDA under an

Emergency Use Authorization (EUA). This test is only

authorized for the duration of time the declaration that

circumstances exist justifying the authorization of the

emergency use of in vitro diagnostic tests for detection of

SARS-CoV-2 virus and/or diagnosis of COVID-19 infection

under section 564(b)(1) of the Act, 21 U.S.C.

                                        360bbb-3(b)(1), unless the authorization

 is terminated or

revoked sooner.



 

                          6                         Units are mL/min/1.73 m2



Chronic Kidney Disease Staging per NKF:



Stage I & II   GFR >=60       Normal to Mildly Decreased

Stage III      GFR 30-59      Moderately Decreased

Stage IV       GFR 15-29      Severely Decreased

Stage V        GFR <15        Very Little GFR Left

ESRD           GFR <15 on RRT









Procedures





                Date            Code            Description     Status

 

                10/26/2021      31958           Office/Outpatient Established Lo

w MDM 20-29 Min Completed

 

                10/15/2021      19955           Office/Outpatient Established Lo

w MDM 20-29 Min Completed

 

                2021      05697           Office/Outpatient Established Mo

d MDM 30-39 Min Completed

 

                2021      07738           Office/Outpatient Established Lo

w MDM 20-29 Min Completed

 

                2021      08085           Office/Outpatient Established Lo

w MDM 20-29 Min Completed

 

                2020      [id number]        Mammogram       Completed

 

                2019      [id number]        Mammogram       Completed







[employer]





                                        Description

 

                                        No Information Available







Encounters





           Type       Date       Location   Provider   Dx         Diagnosis

 

                Office Visit    10/26/2021  2:40p [facility] ELMER Kan               R09.89                    Oth symptoms and signs invol

ving the circ and resp systems

 

                          D72.89                    Other specified disorders of

 white blood cells

 

                Office Visit    10/15/2021  2:00p [facility] ELMER Kan               R42                       Dizziness and giddiness

 

                          R35.0                     Frequency of micturition

 

                Office Visit    2021 10:00a [facility] ELMER Kan               F33.0                     Major depressive disorder, r

ecurrent, mild

 

                          E78.2                     Mixed hyperlipidemia

 

                          K21.9                     Gastro-esophageal reflux dis

ease without esophagitis

 

                          F03.90                    Unspecified dementia without

 behavioral disturbance

 

                          Z87.11                    Personal history of peptic u

lcer disease

 

                          Z79.899                   Other long term (current) dr roberts therapy

 

                Office Visit    2021  8:30a [facility] ELMER Kan               Z48.02                    Encounter for removal of sut

ures

 

                          W10.8xxA                  Fall (on) (from) other stair

s and steps, initial encounter

 

                          S00.83xA                  Contusion of other part of h

ead, initial encounter

 

                          Z23                       Encounter for immunization

 

                Office Visit    2021  1:30p [facility] ELMER Kan               S01.81xA                  Laceration w/o foreign body 

of oth part of head, init 

encntr

 

                          W10.8xxA                  Fall (on) (from) other stair

s and steps, initial encounter

 

                          S00.83xA                  Contusion of other part of h

ead, initial encounter







Assessments





                Date            Code            Description     Provider

 

                    10/26/2021          R09.89              Other specified symp

toms and signs involving the circulatory 

and respiratory systems                 ELMER Kan

 

                10/26/2021      D72.89          Other specified disorders of [facility]

te blood cells ELMER Kan

 

                10/15/2021      R42             Dizziness and giddiness ELMER Kan

 

                10/15/2021      R35.0           Frequency of micturition ELMER Kan

 

                2021      F33.0           Major depressive disorder, recur

rent, mild ELMER Kan

 

                2021      E78.2           Mixed hyperlipidemia ELMER Ramos

 

                2021      K21.9           Gastro-esophageal reflux disease

 without esophagitis ELMER Kan

 

                2021      F03.90          Unspecified dementia without beh

avioral disturbance ELMER Kan

 

                2021      Z87.11          Personal history of peptic ulcer

 disease ELMER Kan

 

                2021      Z79.899         Other long term (current) drug t

herapy ELMER Kan

 

                2021      Z48.02          Encounter for removal of sutures

 ELMER Kan

 

                2021      W10.8xxA        Fall (on) (from) other stairs an

d steps, initial encounter 

ELMER Kan

 

                2021      S00.83xA        Contusion of other part of head,

 initial encounter ELMER Kan

 

                2021      Z23             Encounter for immunization ELMER Soliman

 

                    2021          S01.81xA            Laceration without f

oreign body of other part of head, 

initial encounter                       ELMER Kan

 

                2021      W10.8xxA        Fall (on) (from) other stairs an

d steps, initial encounter 

ELMER Kan

 

                2021      S00.83xA        Contusion of other part of head,

 initial encounter ELMER Kan







Plan of Treatment

Future Appointment(s):* 2022 10:00 am - ELMER Kan at [facility]





Functional Status





                                        Description

 

                                        No Information Available







Mental Status





                                        Description

 

                                        No Information Available







Referrals





                                        Description

 

                                        No Information Available

## 2021-11-27 NOTE — CCD
Summarization Of Episode

                             Created on: 2021



AMY OLIVERA

External Reference #: [id number]

: 1935

Sex: Undifferentiated



Demographics





                          Address                   [address] [place] [address] 

[place]  [address]

 

                          Home Phone                [phone]

 

                          Preferred Language        English

 

                          Marital Status            Unknown

 

                          Alevism Affiliation     Unknown

 

                          Race                      Unknown

 

                          Ethnic Group              Not  or 





Author





                          Author                    [facility]

 

                          Organization              [facility]

 

                          Address                   Unknown

 

                          Phone                     Unavailable







Support





                Name            Relationship    Address         Phone

 

                    NETO COE   Next Of Kin         [address] [place]  [address]                     [phone]

 

                    GRACY [place]  [address]                      [phone]

 

                    JAROD,          Next Of Kin         [id number] [place] [address] [place]

[place]  [address]                    [phone]

 

                RE              Next Of Kin     Unknown         [phone]

 

                    JAROD  MADHUDINA   Next Of Kin         [id number] [place] [address] 

[place] [address]                    [phone]

 

                    GRACY COE   ECON                [facility] [place]  [address]                      Unavailable

 

                    Irvine J Anthony  ECON                [address] [facility] 

[place]  [address]                    Unavailable

 

                    IrvineAnthony jackson   ECON                [address] [place] [address] 

[place] [address]                    Unavailable







Care Team Providers





                    Care Team Member Name Role                Phone

 

                    Fish, B Saúl RODRIGUEZ   Unavailable         Unavailable

 

                    Fish, B Saúl RODRIGUEZ   Unavailable         Unavailable

 

                    Fish, B Saúl RODRIGUEZ   Unavailable         Unavailable

 

                    Fish, B Saúl RODRIGUEZ   Unavailable         Unavailable

 

                    Fish, B Saúl RODRIGUEZ   Unavailable         Unavailable

 

                    Fish, B Saúl RODRIGUEZ   Unavailable         Unavailable

 

                    Fish, B Saúl RODRIGUEZ   Unavailable         Unavailable

 

                    Fish, B Saúl RODRIGUEZ   Unavailable         Unavailable

 

                    Fish, B Saúl RODRIGUEZ   Unavailable         Unavailable

 

                    Fish, B Saúl RODRIGUEZ   Unavailable         Unavailable

 

                    Fish, B Saúl RODRIGUEZ   Unavailable         Unavailable

 

                    Fish, B Saúl RODRIGUEZ   Unavailable         Unavailable

 

                    Fish, B Saúl RODRIGUEZ   Unavailable         Unavailable

 

                    Fish, B Saúl RODRIGUEZ   Unavailable         Unavailable

 

                    Fish, B Saúl RODRIGUEZ   Unavailable         Unavailable

 

                    Fish, B Saúl RODRIGUEZ   Unavailable         Unavailable

 

                    Fish, B Saúl RODRIGUEZ   Unavailable         Unavailable

 

                    Fish, B Saúl RODRIGUEZ   Unavailable         Unavailable

 

                    Fish, B Saúl RODRIGUEZ   Unavailable         Unavailable

 

                    Fish, B Saúl RODRIGUEZ   Unavailable         Unavailable

 

                    Fish, B Saúl RODRIGUEZ   Unavailable         Unavailable

 

                    Fish, B Saúl RODRIGUEZ   Unavailable         Unavailable

 

                    Fish, B Saúl RODRIGUEZ   Unavailable         Unavailable

 

                    Fish, B Saúl RODRIGUEZ   Unavailable         Unavailable

 

                    Fish, B Saúl RODRIGUEZ   Unavailable         Unavailable

 

                    Fish, B Saúl RODRIGUEZ   Unavailable         Unavailable

 

                    Fish, B Saúl RODRIGUEZ   Unavailable         Unavailable

 

                    Fish, B Saúl RODRIGUEZ   Unavailable         Unavailable

 

                    Fish, B Saúl RODRIGUEZ   Unavailable         Unavailable

 

                    Fish, B Saúl RODRIGUEZ   Unavailable         Unavailable

 

                    Fish, B Saúl RODRIGUEZ   Unavailable         Unavailable

 

                    Fish, B Saúl RODRIGUEZ   Unavailable         Unavailable

 

                    Fish, B Saúl RODRIGUEZ   Unavailable         Unavailable

 

                    Fish, B Saúl RODRIGUEZ   Unavailable         Unavailable

 

                    Fish, B Saúl RODRIGUEZ   Unavailable         Unavailable

 

                    Fish, B Saúl RODRIGUEZ   Unavailable         Unavailable

 

                    Fish, B Saúl RODRIGUEZ   Unavailable         Unavailable

 

                    Fish, B Saúl RODRIGUEZ   Unavailable         Unavailable

 

                    Fish, B Saúl RODRIGUEZ   Unavailable         Unavailable

 

                    Fish, B Saúl RODRIGUEZ   Unavailable         Unavailable

 

                    Fish, B Saúl RODRIGUEZ   Unavailable         Unavailable

 

                    Fish, B Saúl RODRIGUEZ   Unavailable         Unavailable

 

                    Fish, B Saúl RODRIGUEZ   Unavailable         Unavailable

 

                    Fish, B Saúl RODRIGUEZ   Unavailable         Unavailable

 

                    Fish, B Saúl RODRIGUEZ   Unavailable         Unavailable

 

                    Fish, B Saúl RODRIGUEZ   Unavailable         Unavailable

 

                    Fish, B Saúl RODRIGUEZ   Unavailable         Unavailable

 

                    Fish, B Saúl RODRIGUEZ   Unavailable         Unavailable

 

                    Fish, B Saúl RODRIGUEZ   Unavailable         Unavailable

 

                    Fish, B Saúl RODRIGUEZ   Unavailable         Unavailable

 

                    Fish, B Saúl RODRIGUEZ   Unavailable         Unavailable

 

                    Fish, B Saúl RODRIGUEZ   Unavailable         Unavailable

 

                    Fish, B Saúl RODRIGUEZ   Unavailable         Unavailable

 

                    Fish, B Saúl RODRIGUEZ   Unavailable         Unavailable

 

                    Fish, B Saúl RODRIGUEZ   Unavailable         Unavailable

 

                    Fish, B Saúl RODRIGUEZ   Unavailable         Unavailable

 

                    MCELHERAN,  MINGO PA Unavailable         Unavailable

 

                    MCELHERAN,  MINGO PA Unavailable         Unavailable

 

                    MCELHERAN,  MINGO PA Unavailable         Unavailable

 

                    MCELHERAN,  MINGO PA Unavailable         Unavailable

 

                    MCELHERAN,  MINGO PA Unavailable         Unavailable

 

                    MCELHERAN,  MINGO PA Unavailable         Unavailable

 

                    MCELHERAN,  MINGO PA Unavailable         Unavailable

 

                    MCELHERAN,  MINGO PA Unavailable         Unavailable

 

                    MCELHERAN,  MINGO PA Unavailable         Unavailable

 

                    MCELHERAN,  MINGO PA Unavailable         Unavailable

 

                    MCELHERAN,  MINGO PA Unavailable         Unavailable

 

                    MCELHERAN,  MINGO PA Unavailable         Unavailable

 

                    MCELHERAN,  MINGO PA Unavailable         Unavailable

 

                    MCELHERAN,  MINGO PA Unavailable         Unavailable

 

                    MCELHERAN,  MINGO PA Unavailable         Unavailable

 

                    MCELAN,  MINGO PA Unavailable         Unavailable

 

                    MCELAN,  MINGO PA Unavailable         Unavailable

 

                    MCELAN,  MINGO PA Unavailable         Unavailable

 

                    MCELHERAN,  MINGO PA Unavailable         Unavailable

 

                    MCELHERAN,  MINGO PA Unavailable         Unavailable

 

                    MCELHERAN,  MINGO PA Unavailable         Unavailable

 

                    MCELHERAN,  MINGO PA Unavailable         Unavailable

 

                    MCELHERAN,  MINGO PA Unavailable         Unavailable

 

                    MCELHERAN,  MINGO PA Unavailable         Unavailable

 

                    MCELHERAN,  MINGO PA Unavailable         Unavailable

 

                    MCELHERAN,  MINGO PA Unavailable         Unavailable

 

                    MCELHERAN,  MINGO PA Unavailable         Unavailable

 

                    MCELHERAN,  MINGO PA Unavailable         Unavailable

 

                    MCELHERAN,  MINGO PA Unavailable         Unavailable

 

                    SOLEDAD-REENA,  VÍCTOR DO Unavailable         Unavailable

 

                    SOLEDAD-REENA,  VÍCTOR DO Unavailable         Unavailable

 

                    SOLEDAD-REENA,  VÍCTOR DO Unavailable         Unavailable

 

                    SOLEDAD-REENA,  VÍCTOR DO Unavailable         Unavailable

 

                    SOLEDAD-REENA,  VÍCTOR DO Unavailable         Unavailable

 

                    SOLEDAD-REENA,  VÍCTOR DO Unavailable         Unavailable

 

                    SOLEDAD-REENA,  VÍCTOR DO Unavailable         Unavailable

 

                    SOLEDAD-REENA,  VÍCTOR DO Unavailable         Unavailable

 

                    SOLEDAD-REENA,  VÍCTOR DO Unavailable         Unavailable

 

                    SOLEDAD-REENA,  VÍCTOR DO Unavailable         Unavailable

 

                    SOLEDAD-REENA,  VÍCTOR DO Unavailable         Unavailable

 

                    SOLEDAD-REENA,  VÍCTOR DO Unavailable         Unavailable

 

                    SOLEDAD-REENA,  VÍCTOR DO Unavailable         Unavailable

 

                    SOLEDAD-REENA,  VÍCTRO DO Unavailable         Unavailable

 

                    SOLEDAD-REENA,  VÍCTOR DO Unavailable         Unavailable

 

                    SOLEDAD-REENA,  VÍCTOR DO Unavailable         Unavailable

 

                    SOLEDAD-REENA,  VÍCTOR DO Unavailable         Unavailable

 

                    SOLEDAD-REENA,  VÍCTOR DO Unavailable         Unavailable

 

                    SOLEDAD-REENA,  VÍCTOR DO Unavailable         Unavailable

 

                    SOLEDAD-REENA,  VÍCTOR DO Unavailable         Unavailable

 

                    SOLEDAD-REENA,  VÍCTOR DO Unavailable         Unavailable

 

                    SOLEDAD-REENA,  VÍCTOR DO Unavailable         Unavailable

 

                    SOLEDAD-REENA,  VÍCTOR DO Unavailable         Unavailable

 

                    SOLEDAD-REENA,  VÍCTOR DO Unavailable         Unavailable

 

                    SOLEDAD-REENA,  VÍCTOR DO Unavailable         Unavailable

 

                    SOLEDAD-REENA,  VÍCTOR DO Unavailable         Unavailable

 

                    SOLEDAD-REENA,  VÍCTOR DO Unavailable         Unavailable

 

                    SOLEDAD-REENA,  VÍCTOR DO Unavailable         Unavailable

 

                    SOLEDAD-REENA,  VÍCTOR DO Unavailable         Unavailable

 

                    SOLEDAD-REENA,  VÍCTOR DO Unavailable         Unavailable

 

                    SOLEDAD-REENA,  VÍCTOR DO Unavailable         Unavailable

 

                    SOLEDAD-REENA,  VÍCTOR DO Unavailable         Unavailable

 

                    SOLEDAD-REENA,  VÍCTOR DO Unavailable         Unavailable

 

                    SOLEDAD-REENA,  VÍCTOR DO Unavailable         Unavailable

 

                    SOLEDAD-REENA,  VÍCTOR DO Unavailable         Unavailable

 

                    SOLEDAD-REENA,  VÍCTOR DO Unavailable         Unavailable

 

                    SOLEDAD-REENA,  VÍCTOR DO Unavailable         Unavailable

 

                    SOLEDAD-REENA,  VÍCTOR DO Unavailable         Unavailable

 

                    SOLEDAD-REENA,  VÍCTOR DO Unavailable         Unavailable

 

                    SOLEDAD-REENA,  VÍCTOR DO Unavailable         Unavailable

 

                    SOLEDAD-REENA,  VÍCTOR DO Unavailable         Unavailable

 

                    SOLEDAD-REENA,  VÍCTOR DO Unavailable         Unavailable

 

                    SOLEDAD-REENA,  VÍCTOR DO Unavailable         Unavailable

 

                    SOLEDAD-REENA,  VÍCTOR DO Unavailable         Unavailable

 

                    SOLEDAD-REENA,  VÍCTOR DO Unavailable         Unavailable

 

                    SOLEDAD-REENA,  VÍCTOR DO Unavailable         Unavailable

 

                    SOLEDAD-REENA,  VÍCTOR DO Unavailable         Unavailable

 

                    SOLEDAD-REENA,  VÍCTOR DO Unavailable         Unavailable

 

                    SOLEDAD-REENA,  VÍCTOR DO Unavailable         Unavailable

 

                    SOLEDAD-REENA,  VÍCTOR DO Unavailable         Unavailable

 

                    SOLEDAD-REENA,  VÍCTOR DO Unavailable         Unavailable

 

                    SOLEDAD-REENA,  VÍCTOR DO Unavailable         Unavailable

 

                    SOLEDAD-REENA,  VÍCTOR DO Unavailable         Unavailable

 

                    SOLEDAD-REENA,  VÍCTOR DO Unavailable         Unavailable

 

                    SOLEDAD-REENA,  VÍCTOR DO Unavailable         Unavailable

 

                    SOLEDAD-REENA,  VÍCTOR DO Unavailable         Unavailable

 

                    SOLEDAD-REENA,  VÍCTOR DO Unavailable         Unavailable

 

                    SOLEDAD-REENA,  VÍCTOR DO Unavailable         Unavailable

 

                    SOLEDAD-REENA,  VÍCTOR DO Unavailable         Unavailable

 

                    SOLEDAD-REENA,  VÍCTOR DO Unavailable         Unavailable

 

                    SOLEDAD-REENA,  VÍCTOR DO Unavailable         Unavailable

 

                    SOLEDAD-REENA,  VÍCTOR DO Unavailable         Unavailable

 

                    SOLEDAD-REENA,  VÍCTOR DO Unavailable         Unavailable

 

                    SOLEDAD-REENA,  VÍCTOR DO Unavailable         Unavailable

 

                    SOLEDAD-REENA,  VÍCTOR DO Unavailable         Unavailable

 

                    SOLEDAD-REENA,  VÍCTOR DO Unavailable         Unavailable

 

                    SOLEDAD-REENA,  VÍCTOR DO Unavailable         Unavailable

 

                    SOLEDAD-REENA,  VÍCTOR DO Unavailable         Unavailable

 

                    SOLEDAD-REENA,  VÍCTOR DO Unavailable         Unavailable

 

                    SOLEDAD-REENA,  VÍCTOR DO Unavailable         Unavailable

 

                    SOLEDAD-REENA,  VÍCTOR DO Unavailable         Unavailable

 

                    SOLEDAD-REENA,  VÍCTOR DO Unavailable         Unavailable

 

                    SOLEDAD-REENA,  VÍCTOR DO Unavailable         Unavailable

 

                    SOLEDAD-REENA,  VÍCTOR DO Unavailable         Unavailable

 

                    SOLEDAD-REENA,  VÍCTOR DO Unavailable         Unavailable

 

                    SOLEDAD-REENA,  VÍCTOR DO Unavailable         Unavailable

 

                    SOLEDAD-RENEA,  VÍCTOR DO Unavailable         Unavailable

 

                    SOLEDAD-REENA,  VÍCTOR DO Unavailable         Unavailable

 

                    SOLEDAD-REENA,  VÍCTOR DO Unavailable         Unavailable

 

                    SOLEDAD-REENA,  VÍCTOR DO Unavailable         Unavailable

 

                    SOLEDAD-REENA,  VÍCTOR DO Unavailable         Unavailable

 

                    SOLEDAD-REENA,  VÍCTOR DO Unavailable         Unavailable

 

                    SOLEDAD-REENA,  VÍCTOR DO Unavailable         Unavailable

 

                    SOLEDAD-REENA,  VÍCTOR DO Unavailable         Unavailable

 

                    O'norbert, A Mingo PA Unavailable         Unavailable

 

                    O'norbert, A Mingo PA Unavailable         Unavailable

 

                    O'norbert, A Mingo PA Unavailable         Unavailable

 

                    O'norbert, A Mingo PA Unavailable         Unavailable

 

                    O'norbert, A Mingo PA Unavailable         Unavailable

 

                    O'norbert, A Mingo PA Unavailable         Unavailable

 

                    O'norbert, A Mingo PA Unavailable         Unavailable

 

                    O'norbert, A Mingo PA Unavailable         Unavailable

 

                    O'norbert, A Mingo PA Unavailable         Unavailable

 

                    O'norbert, A Mingo PA Unavailable         Unavailable

 

                    O'norbert, A Mingo PA Unavailable         Unavailable

 

                    O'norbert, A Mingo PA Unavailable         Unavailable

 

                    O'norbert, A Mingo PA Unavailable         Unavailable

 

                    O'norbert, A Mingo PA Unavailable         Unavailable

 

                    O'norbert, A Mingo PA Unavailable         Unavailable

 

                    O'norbert, A Mingo PA Unavailable         Unavailable

 

                    O'norbert, A Mingo PA Unavailable         Unavailable

 

                    O'norbert, A Mingo PA Unavailable         Unavailable

 

                    O'norbert, A Mingo PA Unavailable         Unavailable

 

                    O'norbert, A Mingo PA Unavailable         Unavailable

 

                    O'norbert, A Mingo PA Unavailable         Unavailable

 

                    O'norbert, A Mingo PA Unavailable         Unavailable

 

                    O'norbert, A Mingo PA Unavailable         Unavailable

 

                    O'norbert, A Mingo PA Unavailable         Unavailable

 

                    O'norbert, A Mingo PA Unavailable         Unavailable

 

                    O'norbert, A Mingo PA Unavailable         Unavailable

 

                    O'norbert, A Mingo PA Unavailable         Unavailable

 

                    O'norbert, A Mingo PA Unavailable         Unavailable

 

                    O'norbert, A Mingo PA Unavailable         Unavailable

 

                    O'norbert, A Mingo PA Unavailable         Unavailable

 

                    O'norbert, A Mingo PA Unavailable         Unavailable

 

                    O'norbert, A Mingo PA Unavailable         Unavailable

 

                    O'norbert, A Mingo PA Unavailable         Unavailable

 

                    FORNI, R AFRICA DPM  Unavailable         Unavailable

 

                    FORNI, R AFRICA DPM  Unavailable         Unavailable

 

                    FORNI, R AFRICA DPM  Unavailable         Unavailable

 

                    FORNI, R AFRICA DPM  Unavailable         Unavailable

 

                    FORNI, R AFRICA DPM  Unavailable         Unavailable

 

                    FORNI, R AFRICA DPM  Unavailable         Unavailable

 

                    FORNI, R AFRICA DPM  Unavailable         Unavailable

 

                    FORNI, R AFRICA DPM  Unavailable         Unavailable

 

                    FORNI, R AFRICA DPM  Unavailable         Unavailable

 

                    FORNI, R AFRICA DPM  Unavailable         Unavailable

 

                    FORNI, R AFRICA DPM  Unavailable         Unavailable

 

                    FORNI, R AFRICA DPM  Unavailable         Unavailable

 

                    FORNI, R AFRICA DPM  Unavailable         Unavailable

 

                    FORNI, R AFRICA DPM  Unavailable         Unavailable

 

                    FORNI, R AFRICA DPM  Unavailable         Unavailable

 

                    FORNI, R AFRICA DPM  Unavailable         Unavailable

 

                    FORNI, R AFRICA DPM  Unavailable         Unavailable

 

                    FORNI, R AFRICA DPM  Unavailable         Unavailable

 

                    FORNI, R AFRICA DPM  Unavailable         Unavailable

 

                    FORNI, R AFRICA DPM  Unavailable         Unavailable

 

                    CARMENCITA, A DINORAH DO Unavailable         Unavailable

 

                    CARMENCITA, A DINORAH DO Unavailable         Unavailable

 

                    CARMENCITA, A DINORAH DO Unavailable         Unavailable

 

                    CARMENCITA, A DINORAH DO Unavailable         Unavailable

 

                    CARMENCITA, A DINORAH DO Unavailable         Unavailable

 

                    CARMENCITA, A DINORAH DO Unavailable         Unavailable

 

                    CARMENCITA, A DINORAH DO Unavailable         Unavailable

 

                    CARMENCITA, A DINORAH DO Unavailable         Unavailable

 

                    CARMENCITA, A DINORAH DO Unavailable         Unavailable

 

                    CARMENCITA, A DINORAH DO Unavailable         Unavailable

 

                    CARMENCITA, A DINORAH DO Unavailable         Unavailable

 

                    CARMENICTA, A DINORAH DO Unavailable         Unavailable

 

                    CARMENCITA, A DINORAH DO Unavailable         Unavailable

 

                    CARMENCITA, A DINORAH DO Unavailable         Unavailable

 

                    CARMENCITA, A DINORAH DO Unavailable         Unavailable

 

                    CARMENCITA, A DINORAH DO Unavailable         Unavailable

 

                    CARMENCITA, A DINORAH DO Unavailable         Unavailable

 

                    CARMENCITA, A DINORAH DO Unavailable         Unavailable

 

                    CARMENICTA, A DINORAH DO Unavailable         Unavailable

 

                    CARMENCITA, A DINORAH DO Unavailable         Unavailable

 

                    CARMENCITA, A DINORAH DO Unavailable         Unavailable

 

                    CARMENCITA, A DINORAH DO Unavailable         Unavailable

 

                    CARMENCITA, A DINORAH DO Unavailable         Unavailable



                                  



Re-disclosure Warning

          The records that you are about to access may contain information from 
federally-assisted alcohol or drug abuse programs. If such information is 
present, then the following federally mandated warning applies: This information
has been disclosed to you from records protected by federal confidentiality 
rules (42 CFR part 2). The federal rules prohibit you from making any further 
disclosure of this information unless further disclosure is expressly permitted 
by the written consent of the person to whom it pertains or as otherwise 
permitted by 42 CFR part 2. A general authorization for the release of medical 
or other information is NOT sufficient for this purpose. The Federal rules 
restrict any use of the information to criminally investigate or prosecute any 
alcohol or drug abuse patient.The records that you are about to access may 
contain highly sensitive health information, the redisclosure of which is 
protected by Article 27-F of the [facility] Public Health law. If you 
continue you may have access to information: Regarding HIV / AIDS; Provided by 
facilities licensed or operated by the [facility] Office of Mental Health; 
or Provided by the [facility] Office for People With Developmental 
Disabilities. If such information is present, then the following [place] 
mandated warning applies: This information has been disclosed to you from 
confidential records which are protected by state law. State law prohibits you 
from making any further disclosure of this information without the specific 
written consent of the person to whom it pertains, or as otherwise permitted by 
law. Any unauthorized further disclosure in violation of state law may result in
a fine or halfway sentence or both. A general authorization for the release of 
medical or other information is NOT sufficient authorization for further disc
losure.                                                                         
    



Family History

          



             Family Member Name Family Member Gender Family Member Status Date o

f Status 

Description                             Data Source(s)

 

           Unknown    Male       Problem                          [facility] ([facility])

 

           Unknown    Male       Problem                          [facility] ([facility])

 

                                        () 

 

           Unknown    Female     Problem                          [facility] ([facility])



                                                                                
                           



Encounters

          



           Encounter  Providers  Location   Date       Indications Data Source(s

)

 

                Outpatient      Attender: AFRICA PITT DPM                 2021 02:30:00 PM EDT - 2021 

02:30:00 PM EDT                                     [facility]

 

             Outpatient   Attender: AFRICA PITT DPM Family Practice 2021 0

2:30:00 PM EDT 

                                        [facility] ([facility])

 

                Outpatient      Attender: Mingo PAYNE [facility] 

10/26/2021 02:40:00 PM EDT                           [facility] ([facility])

 

                Unknown                         [address] [place] [address] 10/20/2021 12:00:00 AM 

EDT                                                 [id number] ([facility])

 

                Outpatient      Attender: Mingo PAYNE [facility] 

10/15/2021 02:00:00 PM EDT                           [facility] ([facility])

 

             Outpatient   Attender: Saúl Tejada MD Physical Therapy 2021 0

2:45:00 PM EDT 

                                        [facility] ([facility])

 

                Outpatient      Attender: Mingo PAYNE [facility] 

2021 10:00:00 AM EDT                           [facility] ([facility])

 

                                        <td ID="encounterTypeDescriptionID0">TRI

AGE NON URGENT</td><td>Dinorah Dixon DO</td><td>Jax Schrader MD 
[facility]</td><td>2021</td><td>1:58PM</td><td>3:54PM</td><td></td>Outpatient 

Attender: DINORAH Villafuerte MD [facility]  2021 01:58:00 PM 

EDT - 2021 03:54:00 PM EDT                           LENNY (Jax Perez MD [facility])

 

                Outpatient      Attender: Mingo PAYNE [facility] 

2021 08:30:00 AM EDT                           [facility] ([facility])

 

                Outpatient      Attender: Mingo PAYNE [facility] 

2021 01:30:00 PM EDT                           [facility] ([facility])

 

                Office Visit    Attender: Mingo PAYNE [facility] 

2021 11:00:00 AM EDT                           [facility] ([facility])

 

                    Outpatient          Attender: VÍCTOR SANDERSON DO [facility]                2021 09:20:00 AM EST                     [facility] ([facility]

[facility])

 

                Outpatient      Attender: MINGO PAYNE Physical Therapy 

2020 10:15:00 

AM EST                                              [facility] ([facility]

 

             Outpatient   Attender: Saúl Tejada MD Physical Therapy 2020 0

1:15:00 PM EST 

                                        [facility] ([facility])

 

                                        Outpatient<td ID="encounterTypeDescripti

onID1">3 - 4 WK Post CAT 

SX</td><td>Dinorah Dixon DO</td><td>Jax Schrader MD 
[facility]</td><td>2020</td><td>2:07PM</td><td>4:00PM</td><td><content 
ID="encounterDiagnosisID1-0">Pseudophakia</content></td> Attender: DINORAH Villafuerte MD [facility]  2020 02:07:00 PM EST -

 2020 

04:00:00 PM EST                         

PseudophakiaPseudophakiaPseudophakiaPseudophakiaPseudophakiaPseudophakia 

LENNY (Jax Perez MD [facility])

 

                                        Pseudophakia 

 

                                        Pseudophakia 

 

                                        Pseudophakia 

 

                                        Pseudophakia 

 

                                        Pseudophakia 

 

                                        Pseudophakia 

 

                Outpatient      Attender: Mingo PAYNE [facility] 

10/07/2020 10:40:00 AM EDT                           [facility]

 

                                        <td ID="encounterTypeDescriptionID2">1 W

[place] Post OP</td><td>Dinorah Dixon 

DO</td><td>Jax Schrader MD 
[facility]</td><td>10/02/2020</td><td>12:17PM</td><td>1:27PM</td><td><content 
ID="encounterDiagnosisID2-0">Pseudophakia</content></td>Outpatient Attender: 

DINORAH Villafuerte MD [facility]  10/02/2020 12:17:00 PM EDT -

 

10/02/2020 01:27:00 PM EDT              

PseudophakiaPseudophakiaPseudophakiaPseudophakiaPseudophakiaPseudophakia 

LENNY (Jax Perez MD [facility])

 

                                        Pseudophakia 

 

                                        Pseudophakia 

 

                                        Pseudophakia 

 

                                        Pseudophakia 

 

                                        Pseudophakia 

 

                                        Pseudophakia 



                                                                                
                                                                                
                                                                                
     



Immunizations

          



             Vaccine      Date         Status       Description  Data Source(s)

 

             COVID-19 VACCINE Moderna 10/24/2021 12:00:00 AM EDT completed      

           [facility]

 

                                        Vaccine Series Complete: YESThis Data wa

s Submitted to [facility] [employer]. 

 

             Tdap         2021 09:07:00 AM EDT completed                 M

KAREEN ([facility])

 

                    COVID-19 VACCINE, MRNA-1273, LNP-S (MODERNA)/PF 2021 1

2:00:00 AM EST 

completed                                           [facility]

 

             COVID-19 VACCINE Moderna 2021 12:00:00 AM EST completed      

           [facility]

 

                                        Vaccine Series Complete: NOThis Data was

 Submitted to [facility] [employer]. 

 

                    COVID-19 VACCINE, MRNA-1273, LNP-S (MODERNA)/PF 2021 1

2:00:00 AM EST 

completed                                           [facility]

 

             pneumococcal polysaccharide PPV23 2020 07:41:00 AM EST comple

robin                 [facility] 

([facility])



                                                                                
                                                         



Medications

          



          Medication Brand Name Start Date Product Form Dose      Route     Admi

nistrative 

Instructions Pharmacy Instructions Status     Indications Reaction   Description

 Data 

Source(s)

 

       Covid-19 vaccine, Unspecified        10/24/2021 12:00:00 AM EDT          

                          completed  

                                                            [facility] ([facility])

 

                                        Medication administered onsite 

 

                    Sulfamethoxazole 800 MG / Trimethoprim 160 MG Oral Tablet [B

actrim] Bactrim DS          

10/15/2021 12:00:00 AM EDT               ORAL                 completed         

             [facility] ([facility])

 

                                        diphtheria toxoid vaccine, inactivated 4

 UNT/ML / tetanus toxoid vaccine, 

inactivated 4 UNT/ML Injectable Suspension Tetanus/Diphtheria Toxoids-Adsorbed 

Adult 2021 12:00:00 AM EDT                               completed        

           [facility] ([facility])

 

                          Calcium Carbonate 1500 MG / Cholecalciferol 400 UNT Or

al Tablet Calcium 600+D 

High Potency 2021 12:00:00 AM EDT             ORAL              active    

               [facility] ([facility])

 

     Prevagen Extra Strength      2021 12:00:00 AM EDT           ORAL     

      active                

[facility] ([facility])

 

       Covid-19 vaccine, Unspecified        2021 12:00:00 AM EST          

                          completed  

                                                            [facility] ([facility])

 

                                        Medication administered onsite 

 

       Covid-19 vaccine, Unspecified        2021 12:00:00 AM EST          

                          completed  

                                                            [facility] ([facility])

 

                                        Medication administered onsite 

 

                    Memantine hydrochloride 10 MG Oral Tablet Memantine HCL     

  10/07/2020 12:00:00 AM 

EDT                                       active                      [facility] ([facility])

 

                                        0.5 ML Streptococcus pneumoniae serotype

 1 capsular antigen diphtheria [id number] 

protein conjugate vaccine 0.0044 MG/ML / Streptococcus pneumoniae serotype 14 
capsular antigen diphtheria CRK211 protein conjugate vaccine 0.0044 MG/ML / 
Streptococcus pneumonia Prevnar 13 10/07/2020 12:00:00 AM EDT                   

                      

completed                                                       [facility] ([facility])

 

                                        0.5 ML pneumococcal capsular polysacchar

cristine type 1 vaccine 0.05 MG/ML / 

pneumococcal capsular polysaccharide type 10A vaccine 0.05 MG/ML / pneumococcal 
capsular polysaccharide type 11A vaccine 0.05 MG/ML / pneumococcal capsular 
polysaccharide type 12F vac Pneumovax 23 10/07/2020 12:00:00 AM EDT             

                            

active                                                          [facility] ([facility])

 

                                        besifloxacin 6 MG/ML Ophthalmic Suspensi

on [Besivance] Besivance 0.6% Ophthalmic

 Suspension  Besivance 0.6% Ophthalmic Suspension 09/10/2020 12:00:00 AM EDT    

                       

                              aborted                       besifloxacin 6 MG/ML

 Ophthalmic Suspension [Besivance] 

LENNY (Jax Perez MD [facility])

 

                    Memantine hydrochloride 5 MG Oral Tablet Memantine HCL      

 2020 12:00:00 AM 

EDT                                       completed                      [facility] 

([facility])



                                                                                
                                                                                
                  



Insurance Providers

          



             Payer name   Policy type / Coverage type Policy ID    Covered party

 ID Covered 

party's relationship to holcomb Policy [place]             Plan Information

 

          [address] [employer]           [id number]        

                 

 

          [place] Medigap Part B           [id number]    Self            

     

 

          MEDICARE COMPLETE           [id number]           SP                  [id number]

[id number]

 

          MEDICARE COMPLETE           [id number]           SP                  91

[id number]

 

          MEDICARE COMPLETE           [id number]           SP                  91

[id number]

 

          [employer]ETNA MEDICARE           [id number]            SP                  MEBRM

[id number]

 

          [employer] MEDICARE           [id number]            SP                  MEBRM

8MG

 

          MEDICARE COMPLETE           [id number]           SP                  

[id number]

 

          [employer]ETNA MEDICARE           [id number]            SP                  MEBRM

8MG

 

          [employer] O         [id number]           S                   VYM

[id number]

 

          [facility] COMMUNITY PLAN MCDO           [id number]           SP        

          [id number]

 

          MEDICARE COMPLETE           [id number]           SP                 

 [id number]

 

          [facility]           [id number]            [id number]                 

[id number]

 

          [facility]  [employer] P         [id number] P       

            [id number]

 

          [facility] KELLY           UNAVAILABLE                             

  UNAVAILABLE

 

          [facility] KELLY           [id number]           SP               

   [id number]

 

          [employer] P         [id number] S                   VYM

[id number]

 

          MEDICARE BLUE PPO      [id number]           [id number]           SP        

          [id number]

 

          [facility] COMMUNITY PLAN MCDO           [id number]           SP         

         [id number]

 

          [facility] O         [id number] S            

       [id number]

 

                              [id number]                               [id number]

 

          MEDICARE COMPLETE-[facility] O         [id number]           S               

    [id number]

 

          AETNA MEDICARE O         [id number]  [id number] S                   MEBRM

[id number]

 

          AETNA MEDICARE CO        [id number]            [id number]                  MEBRM

8MG

 

          MEDICARE COMPLETE           [id number]           SP                  91

[id number]

 

          [facility]         [id number] S                 

  [id number]

 

                [facility] Medicare Commercial      [id number]    

[id number] Self                                    

[id number]

 

                    ANSI-Medicare Part B [id number]   

                            

[id number]

 

                    ANSI-Medicare Part B [id number]   

                            

[id number]

 

                    ANSI-Medicare Part B [id number]   

                            

[id number]

 

                    ANSI-Medicare Part B [id number]   

                            

[id number]

 

          MEDICARE COMPLETE           [id number]           SP                  

[id number]

 

          AETNA MEDICARE O         [id number]  [id number] S                   MEBRM

[id number]

 

          [employer]     Commercial [id number]  [id number] S

elf                [id number]

 

          [employer]     Commercial [id number]  [id number]

[id number] Self                

[id number]

 

          [employer]           [id number]           SP                  1

[id number]

 

          MEDICARE            [id number]                             [id number]

A



                                                                                
                                                                                
                                                                                
                                                                                
                                                                                
                                                



Problems, Conditions, and Diagnoses

          



           Code       Display Name Description Problem Type Effective Dates Data

 Source(s)

 

             Z67747       Pain in right toe(s) Pain in right toe(s) Diagnosis   

 2021 02:30:00 

PM EDT                                  [facility]

 

           L600       Ingrowing nail Ingrowing nail Diagnosis  2021 02:30:

00 PM EDT [facility]

 

             366.16       Cataract Senile Nuclear Cataract Senile Nuclear Proble

      2020 

12:00:00 AM EDT - 10/02/2020 12:00:00 AM EDT LENNY (Jax Perez MD 

[facility])

 

             366.16       Cataract Senile Nuclear Cataract Senile Nuclear Proble

      2020 

12:00:00 AM EDT - 10/02/2020 12:00:00 AM EDT LENNY (Jax Perez MD 

[facility])

 

             366.16       Cataract Senile Nuclear Cataract Senile Nuclear Proble

      2020 

12:00:00 AM EDT - 10/02/2020 12:00:00 AM EDT LENNY (Jax Perez MD 

[facility])

 

             366.16       Cataract Senile Nuclear Cataract Senile Nuclear Proble

      2020 

12:00:00 AM EDT - 10/02/2020 12:00:00 AM EDT LENNY (Jax Perez MD 

[facility])

 

             366.16       Cataract Senile Nuclear Cataract Senile Nuclear Proble

      2020 

12:00:00 AM EDT - 10/02/2020 12:00:00 AM EDT LENNY (Jax Perez MD 

[facility])

 

             366.16       Cataract Senile Nuclear Cataract Senile Nuclear Proble

      2020 

12:00:00 AM EDT - 10/02/2020 12:00:00 AM EDT LENNY (Jax Perez MD 

[facility])



                                                                                
                                                                                
        



Surgeries/Procedures

          



             Procedure    Description  Date         Indications  Data Source(s)

 

             Debridement Nails Any Method 1-5              2021 12:00:00 A

M EDT              [facility]

 

             OFFICE OUTPATIENT NEW 20 MINUTES              2021 12:00:00 A

M EDT              [facility] ([facility])

 

             OFFICE OUTPATIENT VISIT 15 MINUTES              10/26/2021 12:00:00

 AM EDT              [facility]

 

             OFFICE OUTPATIENT VISIT 15 MINUTES              10/15/2021 12:00:00

 AM EDT              [facility]

 

             ARTHROCENTESIS ASPIR&/INJECTION MAJOR JT/BURSA              

021 12:00:00 AM EDT              

[facility] ([facility])

 

             OFFICE OUTPATIENT VISIT 25 MINUTES              2021 12:00:00

 AM EDT              [facility] ([facility])

 

             OFFICE OUTPATIENT VISIT 25 MINUTES              2021 12:00:00

 AM EDT              [facility] ([facility])

 

                          Extracapsular extraction of lens (procedure) History o

f extracapsular cataract 

extraction  PCIOL OS by Dr. Dixon 2020 ~PCIOL OD by Dr. Dixon 
2021 12:00:00 AM EDT                     LENNY (Young Perez MD [facility])

 

             OFFICE OUTPATIENT VISIT 15 MINUTES              2021 12:00:00

 AM EDT              [facility] ([facility]

 

             OFFICE OUTPATIENT VISIT 15 MINUTES              2021 12:00:00

 AM EDT              [facility] ([facility])

 

             THERAPEUTIC PX 1/> AREAS EACH 15 MIN EXERCISES               12:00:00 AM EST              

[facility] ([facility])

 

             MANUAL THERAPY TQS 1/> REGIONS EACH 15 MINUTES               12:00:00 AM EST              

[facility] ([facility])

 

             APPLICATION MODALITY 1/> AREAS HOT/COLD PACKS              20

20 12:00:00 AM EST              

[facility] ([facility])

 

             Physical Therapy Eval - Mod Complexity              2020 12:0

0:00 AM EST              [facility] 

([facility])

 

             ARTHROCENTESIS ASPIR&/INJECTION MAJOR JT/BURSA              

020 12:00:00 AM EST              

[facility] ([facility])

 

                          Extracapsular extraction of lens (procedure) History o

f extracapsular cataract 

extraction  PCIOL OS by Dr. Dixon 2020 ~PCIOL OD by Dr. Dixon 
2020 12:00:00 AM EST                     LENNY (Young Perez MD [facility])

 

             Mammography (procedure)              2020 12:00:00 AM EST    

          [facility] ([facility])

 

                    Surgical / procedural history Surgical / procedural history 

10/02/2020 12:00:00 

AM VIANCA GALVEZ (Jax Perez MD [facility])

 

                          Extracapsular extraction of lens (procedure) History o

f extracapsular cataract 

extraction  PCIOL OS by Dr. Dixon 2020 ~PCIOL OD by Dr. Dixon 
2020           10/02/2020 12:00:00 AM EDT                     LENNY (Young

id LESLEE Perez MD [facility])



                                                                                
                                                                                
                                                                                
                            



Results

          



                    ID                  Date                Data Source

 

                    [id number]            10/21/2021 12:33:00 PM EDT [facility] ([facility])









          Name      Value     Range     Interpretation Code Description Data Kamla

rce(s) Supporting 

Document(s)

 

                Platelets [#/volume] in Blood by Estimate Laboratory test result

                 Normal 

(applies to non-numeric results)                           [facility] ([facility])

                                         









                    ID                  Date                Data Source

 

                    [id number]            10/21/2021 12:33:00 PM EDT [facility] ([facility])









          Name      Value     Range     Interpretation Code Description Data Kamla

rce(s) Supporting 

Document(s)

 

           Neutrophils 57 %       28-66      Normal (applies to non-numeric resu

lts)            [facility] ([facility])           

 

           Lymphocytes 25 %       16-44      Normal (applies to non-numeric resu

lts)            [facility] ([facility])           

 

           Atypical Lymph 11 %       0-5        Above high normal            MED

ENT ([facility])                                

 

           Eosinophils 4 %        0-3        Above high normal            [facility]

 ([facility])                                    

 

           Monocytes  3 %        0-5        Normal (applies to non-numeric resul

ts)            [facility] ([facility])           

 

             Hypochromasia Laboratory test result              Normal (applies t

o non-numeric results)  

                          [facility]  









                    ID                  Date                Data Source

 

                    [id number]            10/21/2021 12:33:00 PM EDT [facility] ([facility])









          Name      Value     Range     Interpretation Code Description Data Kamla

rce(s) Supporting 

Document(s)

 

           White Blood Count 4.9 10     4.0-10.0   Normal (applies to non-numeri

c results)            

[facility] ([facility])  

 

           Red Blood Count 4.13 10    4.00-5.40  Normal (applies to non-numeric 

results)            

[facility] ([facility])  

 

           Hemoglobin 12.8 g/dL  12.0-15.5  Normal (applies to non-numeric resul

ts)            [facility] 

([facility])   

 

                Mean Corpuscular Hemoglobin 31.0 pg         27.0-33.0       Norm

al (applies to non-numeric 

results)                                [facility] ([facility])  

 

           Hematocrit 38.3 %     36.0-47.0  Normal (applies to non-numeric resul

ts)            [facility] 

([facility])   

 

                Mean Corpuscular Volume 92.7 fl         80.0-96.0       Normal (

applies to non-numeric 

results)                                [facility] ([facility])  

 

                Mean Corpuscular HGB Conc 33.4 g/dL       32.0-36.5       Normal

 (applies to non-numeric 

results)                                [facility] ([facility])  

 

                Red Cell Distribution Width 13.0 %          11.5-14.5       Norm

al (applies to non-numeric 

results)                                [facility] ([facility])  

 

           Nucleated Red Blood Cell % 0.0 %      0-0        Normal (applies to n

on-numeric results)            

[facility] ([facility])  

 

                Platelet Count, Automated 162 10          150-450         Normal

 (applies to non-numeric results)

                                        [facility])  









                    ID                  Date                Data Source

 

                    [id number]            10/21/2021 12:33:00 PM EDT [facility] ([facility])









          Name      Value     Range     Interpretation Code Description Data Kamla

rce(s) Supporting 

Document(s)

 

                Influenza A Amplification Laboratory test result                

 Normal (applies to non-

numeric results)                        [facility])  

 

                                        Negative results do not preclude influen

za or RSV virus

infection and should not be used as the sole basis for

treatment or other patient management decisions.

 

 

                Influenza B Amplification Laboratory test result                

 Normal (applies to non-

numeric results)                        [facility])  

 

                                        Negative results do not preclude influen

za or RSV virus

infection and should not be used as the sole basis for

treatment or other patient management decisions.

 

 

                RSV Amplification Laboratory test result                 Normal 

(applies to non-numeric 

results)                                [facility])  

 

                                        Negative results do not preclude influen

za or RSV virus

infection and should not be used as the sole basis for

treatment or other patient management decisions.

 

 

                Laboratory test finding (navigational concept) Laboratory test r

esult                 Normal 

(applies to non-numeric results)                           [facility])

                                         

 

                                        A false negative result may occur if a s

pecimen is

improperly collected, transported or handled. False negative

results may also occur if inadequate numbers of organisms

are present in the specimen.  As with any molecular test,

mutations within the target regions of Xpert Xpress

SARS-CoV-2 could affect primer and/or probe binding

resulting in failure to detect the presence of virus.  This

test cannot rule out diseases caused by other bacterial or

viral pathogens.



DISCLAIMER:

Testing was performed using the [employer] SARS-CoV-2 test.

This test was developed and its performance characteristics

determined by [employer]. This test has not been FDA cleared or

approved. This test has been authorized by FDA under an

Emergency Use Authorization (EUA). This test is only

authorized for the duration of time the declaration that

circumstances exist justifying the authorization of the

emergency use of in vitro diagnostic tests for detection of

SARS-CoV-2 virus and/or diagnosis of COVID-19 infection

under section 564(b)(1) of the Act, 21 U.S.C.

                                        360bbb-3(b)(1), unless the authorization

 is terminated or

revoked sooner.

 









                    ID                  Date                Data Source

 

                    [id number]            10/21/2021 12:33:00 PM EDT [facility] ([facility])









          Name      Value     Range     Interpretation Code Description Data Kamla

rce(s) Supporting 

Document(s)

 

           Glucose, Fasting 103 mg/dL       Above high normal            [facility]                      

 

             Creatinine For GFR 0.92 mg/dL   0.55-1.30    Normal (applies to non

-numeric results) 

                          [facility] ([facility])  

 

           Blood Urea Nitrogen 16 mg/dL   7-18       Normal (applies to non-nume

vlad results)            

[facility] ([facility])  

 

                Glomerular Filtration Rate Laboratory test result               

  Normal (applies to non-

numeric results)                        [facility] ([facility])  

 

                                        <content>Units are mL/min/1.73 

m2</content><br/><content></content><br/><content>Chronic Kidney Disease Staging
 per NKF:</content><br/><content></content><br/><content>Stage I & II   GFR >=60
       Normal to Mildly Decreased</content><br/><content>Stage III      GFR 30-
59      Moderately Decreased</content><br/><content>Stage IV       GFR 15-29    
  Severely Decreased</content><br/><content>Stage V        GFR <15        Very 
Little GFR Left</content><br/><content>ESRD           GFR <15 on 
RRT</content><br/><content></content> 

 

           Sodium Level 139 meq/L  136-145    Normal (applies to non-numeric res

ults)            [facility] 

([facility])   

 

           Carbon Dioxide Level 27 meq/L   21-32      Normal (applies to non-num

nehemiah results)            

[facility] ([facility])  

 

           Potassium Serum 4.1 meq/L  3.5-5.1    Normal (applies to non-numeric 

results)            

[facility] ([facility])  

 

           Chloride Level 106 meq/L       Normal (applies to non-numeric r

esults)            [facility]

 ([facility])  

 

           Anion Gap  6 meq/L    8-16       Below low normal            [facility] (

[facility])                                

 

           Calcium Level 8.7 mg/dL  8.8-10.2   Below low normal            [facility]

T ([facility])                       

 

           Alt/SGPT   24 U/L     12-78      Normal (applies to non-numeric resul

ts)            [facility] ([facility])           

 

           Ast/Sgot   21 U/L     7-37       Normal (applies to non-numeric resul

ts)            [facility] ([facility])           

 

           Bilirubin,Total 0.4 mg/dL  0.2-1.0    Normal (applies to non-numeric 

results)            

[facility] ([facility])  

 

           Alkaline Phosphatase 96 U/L          Normal (applies to non-num

nehemiah results)            

[facility] ([facility])  

 

           Albumin/Globulin Ratio 1.1        1.2-2.2    Below low normal        

    [facility] ([facility])                    

 

           Albumin    3.3 GM/DL  3.2-5.2    Normal (applies to non-numeric resul

ts)            [facility] 

([facility])   

 

           Total Protein 6.3 GM/DL  6.4-8.2    Below low normal            [facility]

[facility] ([facility]                       









                    ID                  Date                Data Source

 

                    [id number]            10[facility]21/2021 12:33:[facility]









          Name      Value     Range     Interpretation Code Description Data Kamla

rce(s) Supporting 

Document(s)

 

                                        SARS coronavirus 2 RNA [Presence] in Res

piratory specimen by [facility] with probe 

detection  NEGATIVE                                    [facility]      

 

                                        This lab was ordered by [facility] a

nd reported by [facility].

 









                    ID                  Date                Data Source

 

                    [id number]            10/21/2021 12:32:00 PM EDT [facility] ([facility])









          Name      Value     Range     Interpretation Code Description Data Kamla

rce(s) Supporting 

Document(s)

 

                Appearance, Urine Laboratory test result                 Normal 

(applies to non-numeric 

results)                                [facility] ([facility])  

 

           PH,Urine   5.0 units  5.0-9.0    Normal (applies to non-numeric resul

ts)            [facility] 

([facility])   

 

           Color, Urine Laboratory test result            Normal (applies to non

-numeric results)            

[facility] ([facility])  

 

                Protein, Urine Auto Laboratory test result                 [place]

l (applies to non-numeric 

results)                                [facility] ([facility])  

 

                Specific Gravity Urine Auto 1.013           1.002-1.035     Norm

al (applies to non-numeric 

results)                                [facility]  

 

           Ketone, Urine Auto Laboratory test result            Above high kinga

l            [facility]           

 

                Glucose, Urine (Ua) Auto Laboratory test result                 

Normal (applies to non-numeric

 results)                               [facility] ([facility])  

 

                Urobilinogen, Urine Auto 0.2 mg/dL       0.0-2.0         Normal 

(applies to non-numeric 

results)                                [facility]  

 

                Bilirubin, Urine Auto Laboratory test result                 Nor

mal (applies to non-numeric 

results)                                [facility]  

 

                Nitrite, Urine Auto Laboratory test result                 [place]

l (applies to non-numeric 

results)                                [facility]  

 

           Blood, Urine Blood Laboratory test result            Above high kinga

l            [facility] ([facility])           

 

                Leukocyte Esterase, Urine Auto Laboratory test result           

      Normal (applies to non-

numeric results)                        [facility] ([facility])  

 

           WBC, Urine Auto 0 /HPF     0-3        Normal (applies to non-numeric 

results)            [facility] 

([facility])   

 

           RBC, Urine Auto 0 /HPF     0-3        Normal (applies to non-numeric 

results)            [facility] 

([facility])   

 

           Hyaline Cast, Urine Auto 0 /LPF     0-1        Normal (applies to non

-numeric results)            

[facility]  

 

                Bacteria, Urine Auto Laboratory test result                 Norm

al (applies to non-numeric 

results)                                [facility] ([facility])  

 

                Squamous Epithelial Cell Ur AU 8 /HPF          0-6             N

ormal (applies to non-numeric 

results)                                [facility]  









                    ID                  Date                Data Source

 

                    [id number]            10/19/2021 11:03:00 AM EDT [facility] ([facility])









          Name      Value     Range     Interpretation Code Description Data Kamla

rce(s) Supporting 

Document(s)

 

                Appearance, Urine Laboratory test result                 Normal 

(applies to non-numeric 

results)                                [facility] ([facility])  

 

           Color, Urine Laboratory test result            Normal (applies to non

-numeric results)            

[facility]  

 

                Specific Gravity Urine Auto 1.018           1.002-1.035     Norm

al (applies to non-numeric 

results)                                [facility]  

 

           PH,Urine   5.0 units  5.0-9.0    Normal (applies to non-numeric resul

ts)            [facility] 

([facility])   

 

                Protein, Urine Auto Laboratory test result                 [place]

l (applies to non-numeric 

results)                                [facility] ([facility])  

 

                Ketone, Urine Auto Laboratory test result                 Normal

 (applies to non-numeric 

results)                                [facility]  

 

                Glucose, Urine (Ua) Auto Laboratory test result                 

Normal (applies to non-numeric

 results)                               [facility] ([facility])  

 

           Urobilinogen, Urine Auto 2.0 mg/dL  0.0-2.0    Above high normal     

       [facility] ([facility])           

 

                Bilirubin, Urine Auto Laboratory test result                 Nor

mal (applies to non-numeric 

results)                                [facility] ([facility])  

 

                Nitrite, Urine Auto Laboratory test result                 [place]

l (applies to non-numeric 

results)                                [facility] ([facility])  

 

                Leukocyte Esterase, Urine Auto Laboratory test result           

      Normal (applies to non-

numeric results)                        [facility]  

 

           Blood, Urine Blood Laboratory test result            Above high kinga

l            [facility] ([facility])           

 

           RBC, Urine Auto 7 /HPF     0-3        Above high normal            [facility] ([facility])                       

 

           WBC, Urine Auto 2 /HPF     0-3        Normal (applies to non-numeric 

results)            [facility] 

([facility])   

 

                Bacteria, Urine Auto Laboratory test result                 Norm

al (applies to non-numeric 

results)                                [facility]  

 

                Squamous Epithelial Cell Ur AU 3 /HPF          0-6             N

ormal (applies to non-numeric 

results)                                [facility]  

 

           Mucus, Urine Laboratory test result            Normal (applies to non

-numeric results)            

[facility] ([facility])  

 

           Hyaline Cast, Urine Auto 0 /LPF     0-1        Normal (applies to non

-numeric results)            

[facility]  









                    ID                  Date                Data Source

 

                    [id number]            10/19/2021 10:54:00 AM EDT [facility] ([facility])









          Name      Value     Range     Interpretation Code Description Data Kamla

rce(s) Supporting 

Document(s)

 

                          C reactive protein [Mass/volume] in Serum or Plasma by

 High sensitivity method 

3.08 mg/dL      0.00-0.30       Above high normal                 [facility] ([facility])                                   









                    ID                  Date                Data Source

 

                    [id number]            10/19/2021 10:54:00 AM EDT [facility]









          Name      Value     Range     Interpretation Code Description Data Kamla

rce(s) Supporting 

Document(s)

 

           White Blood Count 4.5 10     4.0-10.0   Normal (applies to non-numeri

c results)            

[facility] ([facility])  

 

           Red Blood Count 4.05 10    4.00-5.40  Normal (applies to non-numeric 

results)            

[facility] ([facility])  

 

           Hemoglobin 12.7 g/dL  12.0-15.5  Normal (applies to non-numeric resul

ts)            [facility] 

([facility])   

 

                Mean Corpuscular Volume 94.1 fl         80.0-96.0       Normal (

applies to non-numeric 

results)                                [facility] ([facility])  

 

           Hematocrit 38.1 %     36.0-47.0  Normal (applies to non-numeric resul

ts)            [facility] 

([facility])   

 

                Mean Corpuscular Hemoglobin 31.4 pg         27.0-33.0       Norm

al (applies to non-numeric 

results)                                [facility]  

 

                Mean Corpuscular HGB Conc 33.3 g/dL       32.0-36.5       Normal

 (applies to non-numeric 

results)                                [facility] ([facility])  

 

           Platelet Count, Automated 147 10     150-450    Below low normal     

       [facility] ([facility])           

 

           Neutrophils % 60.3 %     36.0-66.0  Normal (applies to non-numeric re

sults)            [facility] 

([facility])   

 

                Red Cell Distribution Width 13.0 %          11.5-14.5       Norm

al (applies to non-numeric 

results)                                [facility] ([facility])  

 

           Lymph %    24.1 %     24.0-44.0  Normal (applies to non-numeric resul

ts)            [facility] 

([facility])   

 

           Mono %     9.5 %      2.0-8.0    Above high normal            [facility] 

([facility])                                   

 

           Eos %      5.3 %      0.0-3.0    Above high normal            [facility] 

([facility])                                    

 

           Baso %     0.4 %      0.0-1.0    Normal (applies to non-numeric resul

ts)            [facility] ([facility])           

 

           Nucleated Red Blood Cell % 0.0 %      0-0        Normal (applies to n

on-numeric results)            

[facility] ([facility])  

 

           Immature Granulocyte % 0.4 %      0-3.0      Normal (applies to non-n

umeric results)            

[facility] ([facility])  

 

           Neutrophils # 2.7 10     1.5-8.5    Normal (applies to non-numeric re

sults)            [facility] 

([facility])   

 

           Lymph #    1.1 10     1.5-5.0    Below low normal            [facility] (

[facility])                                

 

           Eos #      0.2 10     0.0-0.5    Normal (applies to non-numeric resul

ts)            [facility] ([facility])           

 

           Mono #     0.4 10     0.0-0.8    Normal (applies to non-numeric resul

ts)            [facility] ([facility])           

 

           Baso #     0.0 10     0.0-0.2    Normal (applies to non-numeric resul

ts)            [facility] ([facility])           









                    ID                  Date                Data Source

 

                    [id number]            10/19/2021 10:54:00 AM EDT [facility] ([facility]

[facility])









          Name      Value     Range     Interpretation Code Description Data Kamla

rce(s) Supporting 

Document(s)

 

           Glucose, Fasting 79 mg/dL        Normal (applies to non-numeric

 results)            

[facility]  

 

             Creatinine For GFR 0.90 mg/dL   0.55-1.30    Normal (applies to non

-numeric results) 

                          [facility] ([facility])  

 

           Blood Urea Nitrogen 18 mg/dL   7-18       Normal (applies to non-nume

vlad results)            

[facility] ([facility])  

 

                Glomerular Filtration Rate Laboratory test result               

  Normal (applies to non-

numeric results)                        [facility] ([facility])  

 

                                        <content>Units are mL/min/1.73 

m2</content><br/><content></content><br/><content>Chronic Kidney Disease Staging
 per NKF:</content><br/><content></content><br/><content>Stage I & II   GFR >=60
       Normal to Mildly Decreased</content><br/><content>Stage III      GFR 30-
59      Moderately Decreased</content><br/><content>Stage IV       GFR 15-29    
  Severely Decreased</content><br/><content>Stage V        GFR <15        Very 
Little GFR Left</content><br/><content>ESRD           GFR <15 on 
RRT</content><br/><content></content> 

 

           Sodium Level 139 meq/L  136-145    Normal (applies to non-numeric res

ults)            [facility] 

([facility])   

 

           Potassium Serum 4.3 meq/L  3.5-5.1    Normal (applies to non-numeric 

results)            

[facility] ([facility])  

 

           Chloride Level 108 meq/L       Above high normal            [facility] ([facility])                       

 

           Carbon Dioxide Level 27 meq/L   21-32      Normal (applies to non-num

nehemiah results)            

[facility] ([facility])  

 

           Anion Gap  4 meq/L    8-16       Below low normal            [facility] (

[facility])                                

 

           Calcium Level 9.0 mg/dL  8.8-10.2   Normal (applies to non-numeric re

sults)            

[facility] ([facility])  

 

           Ast/Sgot   18 U/L     7-37       Normal (applies to non-numeric resul

ts)            [facility] ([facility])           

 

           Bilirubin,Total 0.3 mg/dL  0.2-1.0    Normal (applies to non-numeric 

results)            

[facility] ([facility])  

 

           Alt/SGPT   22 U/L     12-78      Normal (applies to non-numeric resul

ts)            [facility] ([facility])           

 

           Alkaline Phosphatase 85 U/L          Normal (applies to non-num

nehemiah results)            

[facility] ([facility])  

 

           Total Protein 6.0 GM/DL  6.4-8.2    Below low normal            [facility]

T ([facility])                       

 

           Albumin    3.3 GM/DL  3.2-5.2    Normal (applies to non-numeric resul

ts)            [facility] 

([facility])   

 

           Albumin/Globulin Ratio 1.2        1.2-2.2    Normal (applies to non-n

umeric results)            

[facility] ([facility])  









                    ID                  Date                Data Source

 

                    [id number]            10/18/2021 03:35:00 PM EDT [facility] ([facility])









          Name      Value     Range     Interpretation Code Description Data Kamla

rce(s) Supporting 

Document(s)

 

           Albumin [Mass/volume] in Serum or Plasma Laboratory test result      

                            [facility] 

([facility])   

 

           Calcium [Mass/volume] in Serum or Plasma Laboratory test result      

                            [facility] 

([facility])   

 

                          Alanine aminotransferase [Enzymatic activity/volume] i

n Serum or Plasma 

Laboratory test result                                        [facility] ([facility])  

 

                    Carbon dioxide, total [Moles/volume] in Serum or Plasma Labo

ratory test result  

                                                [facility] ([facility])  

 

           Creatinine [Mass/volume] in Serum or Plasma Laboratory test result   

                               [facility] 

([facility])   

 

           Chloride [Moles/volume] in Serum or Plasma Laboratory test result    

                              [facility] 

([facility])   

 

           Glucose [Mass/volume] in Serum or Plasma Laboratory test result      

                            [facility] 

([facility])   

 

                          Alkaline phosphatase [Enzymatic activity/volume] in Se

rum or Plasma Laboratory 

test result                                         [facility] ([facility])  

 

           Potassium [Moles/volume] in Serum or Plasma Laboratory test result   

                               [facility] 

([facility])   

 

           Sodium [Moles/volume] in Serum or Plasma Laboratory test result      

                            [facility] 

([facility])   

 

           Protein [Mass/volume] in Serum or Plasma Laboratory test result      

                            [facility] 

([facility])   

 

           Urea nitrogen [Mass/volume] in Serum or Plasma Laboratory test result

                                  

[facility] ([facility])  

 

                          Aspartate aminotransferase [Enzymatic activity/volume]

 in Serum or Plasma 

Laboratory test result                                        [facility] ([facility])  









                    ID                  Date                Data Source

 

                    [id number]            10/18/2021 03:35:00 PM EDT [facility] ([facility])









          Name      Value     Range     Interpretation Code Description Data Kamla

rce(s) Supporting 

Document(s)

 

           Bacteria identified in Urine by Culture Laboratory test result       

                           [facility] 

([facility])   









                    ID                  Date                Data Source

 

                    [id number]            10/18/2021 03:35:00 PM EDT [facility] ([facility])









          Name      Value     Range     Interpretation Code Description Data Kamla

rce(s) Supporting 

Document(s)

 

           Specific gravity of Urine Laboratory test result                     

             [facility] ([facility])                    

 

           pH of Urine by Test strip Laboratory test result                     

             [facility] ([facility])                    

 

           Color of Urine Laboratory test result                                

  [facility] ([facility])                                

 

           Appearance of Urine Laboratory test result                           

       [facility] ([facility])                       

 

                          Leukocytes [#/area] in Urine sediment by Microscopy hi

gh power field Laboratory 

test result                                         [facility] ([facility])  

 

           Protein [Presence] in Urine by Test strip Laboratory test result     

                             [facility] 

([facility])   

 

           Ketones [Presence] in Urine by Test strip Laboratory test result     

                             [facility] 

([facility])   

 

           Glucose [Presence] in Urine Laboratory test result                   

               [facility] ([facility])                   

 

           Bilirubin.total [Presence] in Urine by Test strip Laboratory test res

ult                                  

[facility] ([facility])  

 

           Urobilinogen [Mass/volume] in Urine by Test strip Laboratory test res

ult                                  

[facility] ([facility])  

 

           Nitrite [Presence] in Urine by Test strip Laboratory test result     

                             [facility] 

([facility])   

 

           Ua Occult Blood Laboratory test result                               

   [facility] ([facility])                               









                    ID                  Date                Data Source

 

                    [id number]            10/18/2021 03:35:00 PM EDT [facility] ([facility])









          Name      Value     Range     Interpretation Code Description Data Kamla

rce(s) Supporting 

Document(s)

 

                          C reactive protein [Mass/volume] in Serum or Plasma by

 High sensitivity method 

Laboratory test result                                        [facility] ([facility])  









                    ID                  Date                Data Source

 

                    [id number]            10/18/2021 03:35:00 PM EDT [facility] ([facility])









          Name      Value     Range     Interpretation Code Description Data Kamla

rce(s) Supporting 

Document(s)

 

           Leukocytes [#/volume] in Blood by Automated count Laboratory test res

ult                                  

[facility] ([facility])  

 

           Hemoglobin [Mass/volume] in Blood Laboratory test result             

                     [facility] ([facility])           

 

             Erythrocytes [#/volume] in Blood by Automated count Laboratory test

 result                            

                          [facility] ([facility])  

 

                          Erythrocyte mean corpuscular volume [Entitic volume] b

y Automated count 

Laboratory test result                                        [facility] ([facility])  

 

                    Hematocrit [Volume Fraction] of Blood by Automated count Lab

oratory test result 

                                                [facility] ([facility])  

 

                          Erythrocyte mean corpuscular hemoglobin [Entitic mass]

 by Automated count 

Laboratory test result                                        [facility] ([facility])  

 

                                        Erythrocyte mean corpuscular hemoglobin 

concentration [Mass/volume] by Automated

 count       Laboratory test result                                        [facility]

T ([facility])                                    

 

           Platelets [#/volume] in Blood by Automated count Laboratory test resu

lt                                  

[facility] ([facility])  

 

                          Erythrocyte distribution width [Ratio] by Automated co

unt Laboratory test result

                                                    [facility] ([facility])  

 

                          Platelet mean volume [Entitic volume] in Blood by Donald Mcfadden Laboratory test 

result                                              [facility] ([facility])  

 

                          Band form neutrophils/100 leukocytes in Body fluid by 

Manual count Laboratory 

test result                                         [facility] ([facility])  

 

           Neutrophils Laboratory test result                                  M

[facility] ([facility])                                    

 

           Monocytes  Laboratory test result                                  ME

[facility] ([facility])                                    

 

                    Eosinophils/100 leukocytes in Body fluid by Manual count Lab

oratory test result 

                                                [facility] ([facility])  

 

                    Lymphocytes/100 leukocytes in Body fluid by Manual count Lab

oratory test result 

                                                [facility] ([facility])  

 

           Basophils [#/volume] in Blood by Automated count Laboratory test resu

lt                                  

[facility] ([facility]  

 

           Basophils/100 leukocytes in Blood Laboratory test result             

                     [facility] ([facility])           

 

           Lymphocytes [#/volume] in Blood Laboratory test result               

                   [facility] ([facility])           

 

           Eosinophils [#/volume] in Blood by Automated count Laboratory test re

sult                                  

[facility] ([facility])  

 

           Neutrophils [#/volume] in Blood by Automated count Laboratory test re

sult                                  

[facility]  

 

           Monocytes [#/volume] in Blood Laboratory test result                 

                 [facility] ([facility])           









                    ID                  Date                Data Source

 

                    [id number]             2020 11:08:00 AM EST [facility]









          Name      Value     Range     Interpretation Code Description Data Kamla

rce(s) Supporting 

Document(s)

 

                Thyroxine (T4) [Mass/volume] in Serum or Plasma 7.5 ug/dL       

4.5-12.0        Normal 

(applies to non-numeric results)                           [facility]

                                         

 

                Thyrotropin [Units/volume] in Serum or Plasma 2.460 uIU/ML    0.

358-3.740     Normal 

(applies to non-numeric results)                           [facility] ([facility])

                                         

 

                Calcidiol [Mass/volume] in Serum or Plasma 60.9 ng/mL      30.0-

100.0      Normal (applies

 to non-numeric results)                     [facility]  









                    ID                  Date                Data Source

 

                    [id number]             2020 11:08:00 AM EST [facility] ([facility])









          Name      Value     Range     Interpretation Code Description Data Kamla

rce(s) Supporting 

Document(s)

 

           Triglycerides Level 133 mg/dL             Normal (applies to non-nume

vlad results)            [facility]

 ([facility])  

 

           Cholesterol Level 165 mg/dL             Normal (applies to non-numeri

c results)            [facility] 

([facility])   

 

           HDL Cholesterol 59 mg/dL              Normal (applies to non-numeric 

results)            [facility] 

([facility])   

 

           LDL Cholesterol 79 mg/dL              Normal (applies to non-numeric 

results)            [facility] 

([facility])   

 

           Non-HDL-C  106 mg/dL             Normal (applies to non-numeric resul

ts)            [facility] ([facility])           

 

           Cholesterol Risk Ratio 2.796                 Normal (applies to non-n

umeric results)            [facility]

 ([facility])  









                    ID                  Date                Data Source

 

                    [id number]             2020 11:08:00 AM EST [facility] ([facility])









          Name      Value     Range     Interpretation Code Description Data Kamla

rce(s) Supporting 

Document(s)

 

           Glucose, Fasting 92 mg/dL        Normal (applies to non-numeric

 results)            

[facility] ([facility])  

 

                Glomerular Filtration Rate Laboratory test result               

  Normal (applies to non-

numeric results)                        [facility] ([facility])  

 

                                        <content>Units are mL/min/1.73 

m2</content><br/><content></content><br/><content>Chronic Kidney Disease Staging
 per NKF:</content><br/><content></content><br/><content>Stage I & II   GFR >=60
       Normal to Mildly Decreased</content><br/><content>Stage III      GFR 30-
59      Moderately Decreased</content><br/><content>Stage IV       GFR 15-29    
  Severely Decreased</content><br/><content>Stage V        GFR <15        Very 
Little GFR Left</content><br/><content>ESRD           GFR <15 on 
RRT</content><br/><content></content> 

 

             Creatinine For GFR 0.74 mg/dL   0.55-1.30    Normal (applies to non

-numeric results) 

                          [facility] ([facility])  

 

           Blood Urea Nitrogen 16 mg/dL   7-18       Normal (applies to non-nume

vlad results)            

[facility] ([facility])  

 

           Sodium Level 144 meq/L  136-145    Normal (applies to non-numeric res

ults)            [facility] 

([facility])   

 

           Potassium Serum 4.4 meq/L  3.5-5.1    Normal (applies to non-numeric 

results)            

[facility]  

 

           Carbon Dioxide Level 31 meq/L   21-32      Normal (applies to non-num

nehemiah results)            

[facility] ([facility])  

 

           Chloride Level 107 meq/L       Normal (applies to non-numeric r

esults)            [facility]

 ([facility])  

 

           Calcium Level 8.7 mg/dL  8.8-10.2   Below low normal            [facility]

T ([facility])                       

 

           Anion Gap  6 meq/L    8-16       Below low normal            [facility] (

[facility])                                

 

           Ast/Sgot   20 U/L     7-37       Normal (applies to non-numeric resul

ts)            [facility] ([facility])           

 

           Alt/SGPT   25 U/L     12-78      Normal (applies to non-numeric resul

ts)            [facility] ([facility])           

 

           Bilirubin,Total 0.7 mg/dL  0.2-1.0    Normal (applies to non-numeric 

results)            

[facility] ([facility])  

 

           Total Protein 6.0 GM/DL  6.4-8.2    Below low normal            [facility]

T ([facility])                       

 

           Alkaline Phosphatase 89 U/L          Normal (applies to non-num

nehemiah results)            

[facility] ([facility])  

 

           Albumin    3.6 GM/DL  3.2-5.2    Normal (applies to non-numeric resul

ts)            [facility] 

([facility])   

 

           Albumin/Globulin Ratio 1.5        1.2-2.2    Normal (applies to non-n

umeric results)            

[facility]  









                    ID                  Date                Data Source

 

                    [id number]             2020 11:08:00 AM EST [facility] ([facility])









          Name      Value     Range     Interpretation Code Description Data Kamla

rce(s) Supporting 

Document(s)

 

           White Blood Count 6.2 10     4.0-10.0   Normal (applies to non-numeri

c results)            

[facility] ([facility])  

 

           Hemoglobin 13.6 g/dL  12.0-15.5  Normal (applies to non-numeric resul

ts)            [facility] 

([facility])   

 

           Red Blood Count 4.42 10    4.00-5.40  Normal (applies to non-numeric 

results)            

[facility]  

 

           Mean Corpuscular Volume 96.2 fl    80.0-96.0  Above high normal      

      [facility]           

 

           Hematocrit 42.5 %     36.0-47.0  Normal (applies to non-numeric resul

ts)            [facility] 

([facility])   

 

                Mean Corpuscular Hemoglobin 30.8 pg         27.0-33.0       Norm

al (applies to non-numeric 

results)                                [facility] ([facility]  

 

                Mean Corpuscular HGB Conc 32.0 g/dL       32.0-36.5       Normal

 (applies to non-numeric 

results)                                [facility] ([facility])  

 

                Red Cell Distribution Width 13.2 %          11.5-14.5       Norm

al (applies to non-numeric 

results)                                [facility] ([facility])  

 

                Platelet Count, Automated 153 10          150-450         Normal

 (applies to non-numeric results)

                                        [facility] ([facility])  

 

           Neutrophils % 56.7 %     36.0-66.0  Normal (applies to non-numeric re

sults)            [facility] 

([facility])   

 

           Lymph %    36.2 %     24.0-44.0  Normal (applies to non-numeric resul

ts)            [facility] 

([facility])   

 

           Eos %      1.4 %      0.0-3.0    Normal (applies to non-numeric resul

ts)            [facility] ([facility])           

 

           Mono %     5.1 %      0.0-5.0    Above high normal            [facility] 

([facility])                                   

 

           Immature Granulocyte % 0.3 %      0-3.0      Normal (applies to non-n

umeric results)            

[facility] ([facility])  

 

           Baso %     0.3 %      0.0-1.0    Normal (applies to non-numeric resul

ts)            [facility] ([facility])           

 

           Nucleated Red Blood Cell % 0.0 %      0-0        Normal (applies to n

on-numeric results)            

[facility] ([facility])  

 

           Neutrophils # 3.5 10     1.5-8.5    Normal (applies to non-numeric re

sults)            [facility] 

([facility])   

 

           Mono #     0.3 10     0.0-0.8    Normal (applies to non-numeric resul

ts)            [facility] ([facility])           

 

           Lymph #    2.3 10     1.5-5.0    Normal (applies to non-numeric resul

ts)            [facility] ([facility])           

 

           Eos #      0.1 10     0.0-0.5    Normal (applies to non-numeric resul

ts)            [facility] ([facility])           

 

           Baso #     0.0 10     0.0-0.2    Normal (applies to non-numeric resul

ts)            [facility] ([facility])           









                    ID                  Date                Data Source

 

                    [id number]          2020 12:00:00 AM EST [facility]

ology Imaging

 

                                        

________________________________________________________________________________

Saúl Tejada MD               Patient Name: BRYCE OLIVERA[facility]       
         YOB: 1935[place]  [address]              Date of 
Exam: 2020#: [phone]: 
[phone]______________________________________________________________________

__________EXAM: MRI LUMBAR SPINE WITHOUT CONTRASTPROCEDURE INFORMATION:Exam: MR 
Lumbar Spine Without Contrast.Exam date and time: 2020 12:24 PM Age: 85 
years oldClinical indication: Low back painTECHNIQUE: Imaging protocol: 
Multiplanar magnetic resonance images of thelumbar spine without intravenous 
contrast.COMPARISON: LUMBOSACRAL SPINE COMPLETE WITH BENDING XRAY 2020 
11:51 AMFINDINGS:Vertebrae: There is a mild levoscoliosis. There is mild 
degenerativeanterolisthesis of L2 on L3. Otherwise,The lumbar vertebral bodies 
arenormal in height,signal intensity and alignment.No acute fracture 
ordislocation is seen.Spinal epidural space: There is no evidence of epidural 
masses orhemorrhage.Spinal cord: The conus medullaris is normal. The cauda 
equina nerve rootsdemonstrate no crowding or displacement.L1-L2: Mildly reduced 
in height and T2 signal indicating degeneration.Thereis a mild diffuse posterior
 bulge causing mild effacement of the thecalsac.The facet joints demonstrate 
mild degenerative hypertrophy andsclerosis.The spinal canal and neural foramina 
are patent and withoutsignificant stenosis.L2-L3: Markedly reduced in height and
 T2 signal indicating degeneration.Moderate adjacent degenerative endplate 
changes. Small diffuse posteriorherniation. Moderate facet arthropathy.There is 
thickening of theligamentum flavum.There is mild spinal canal narrowing, with an
 AP canaldimension of 10 mm.There is moderate bilateral foraminal stenosis.L3-
L4: Moderately reduced in height and T2 signal indicating degeneration.Small 
diffuse posterior herniation. Mild facet arthropathy.There isthickening of the 
ligamentum flavum.There is mild spinal canal narrowing,with an AP canal 
dimension of 10 mm.There is mild bilateral foraminalstenosis. L4-L5: Moderately 
reduced in height and T2 signal indicatingdegeneration. Moderate adjacent 
degenerative endplate changes. Smalldiffuse posterior herniation. Moderate facet
 arthropathy. There is noevidence of spinal canal narrowing. There is mild 
bilateral foraminalstenosis.L5-S1: Disc fusion is noted. There is diffuse 
endplate spurring.The facetjoints demonstrate mild degenerative hypertrophy and 
sclerosis.There is noevidence of spinal canal narrowing.There is mild right 
foraminalstenosis.There is moderate left foraminal stenosis.Soft tissues: The 
prevertebral soft tissues appear normal.IMPRESSION:1. MRI of the lumbar spine 
reveals multilevel degenerative spondyliticchanges and degenerative disc disease
 as described above.2. There is a mild levoscoliosis. There is mild 
degenerativeanterolisthesis of L2 on L3. Otherwise,The lumbar vertebral bodies 
arenormal in height,signal intensity and alignment.No acute fracture 
ordislocation is seen.Thank you for allowing us to participate in the care of 
your patient.Dictated and Authenticated by: Darryl Richey MD 2020 3:42 
PMEastern Time ([place] & [place])VradV/louiecTannita you for referring AMY OLIVERA to our
 office.   Electronically Signed - [facility]  20 15:47   









          Name      Value     Range     Interpretation Code Description Data Kamla

rce(s) Supporting 

Document(s)

 

                                                                       







                                        Procedure

 

                                          



                                                                                
                                                                                
                                                                                
                                                          



Social History

          



           Code       Duration   Value      Status     Description Data Source(s

)

 

           Smoking    10/16/2021 12:00:00 AM EDT Former Smoker completed  Former

 Smoker [id number] 

([facility])

 

             Smoking      2021 02:16:15 PM EDT Never smoked tobacco (findi

axnder) completed    

Never smoked tobacco (finding)          LENNY (Jax Perez MD [facility])

 

             Smoking      2021 02:14:04 PM EDT Never smoked tobacco (findi

ng) completed    

Never smoked tobacco (finding)          LENNY (Jax Perez MD [facility])

 

             Smoking      2021 02:12:59 PM EDT Never smoked tobacco (findi

ng) completed    

Never smoked tobacco (finding)          LENNY (Jax Perez MD [facility])

 

             Smoking      2021 07:45:43 AM EDT Never smoked tobacco (findi

ng) completed    

Never smoked tobacco (finding)          LENNY (Jax Perez [facility])

 

             Smoking      2021 12:00:00 AM EST Patient has never smoked co

mpleted    Patient 

has never smoked                        [facility] ([facility])



                                                                                
                                                                    



Vital Signs

          



                    ID                  Date                Data Source

 

                    UNK                                      









           Name       Value      Range      Interpretation Code Description Data

 Source(s)

 

           Systolic blood pressure 124 mm[Hg]                       124 mm[Hg] M

[facility] ([facility])

 

           Diastolic blood pressure 72 mm[Hg]                        72 mm[facility]Hg[facility]  

[facility]

 

           Body height 61.7 [in_i]                       61.7 [in_i] [facility] ([facility])

 

                                        5'70" 

 

           Body weight 134.00 [lb_av]                       134.00 [lb_av] [facility]

T ([facility])

 

           Body mass index (BMI) [Ratio] 24.7 kg/m2                       24.7 k

g/m2 [facility] ([facility])

 

           Heart rate 81 /min                          81 /min    [facility] ([facility])

 

           Respiratory rate 18 /min                          18 /min    [facility] (

[facility])

 

           Body temperature 98.8 [degF]                       98.8 [degF] [facility]

 ([facility])

 

           Oxygen saturation in Arterial blood by Pulse oximetry 100 %          

                  100 %      [facility] 

([facility])

 

           Ideal body weight 105 [lb_av]                       105 [lb_av] [facility]

T ([facility])

 

           Systolic blood pressure 92 mm[Hg]                        92 mm[Hg]  M

[facility] ([facility])

 

           Diastolic blood pressure 58 mm[Hg]                        58 mm[Hg]  

[facility] ([facility])

 

           Body height 61.7 [in_i]                       61.7 [in_i] [facility] ([facility])

 

                                        5'70" 

 

           Heart rate 80 /min                          80 /min    [facility] ([facility])

 

           Respiratory rate 12 /min                          12 /min    [facility] (

[facility])

 

           Body temperature 98.5 [degF]                       98.5 [degF] [facility]

 ([facility])

 

           Oxygen saturation in Arterial blood by Pulse oximetry 95 %           

                  95 %       [facility] 

([facility])

 

           Ideal body weight 105 [lb_av]                       105 [lb_av] [facility]

T ([facility])

 

           Oxygen saturation in Arterial blood by Pulse oximetry 95 %           

                  95 %       [facility] 

([facility])

 

           Body weight 144.25 [lb_av]                       144.25 [lb_av] [facility]

T ([facility])

 

           Body mass index (BMI) [Ratio] 26.6 kg/m2                       26.6 k

g/m2 [facility] ([facility])

 

           Heart rate 74 /min                          74 /min    [facility] ([facility])

 

           Systolic blood pressure 124 mm[Hg]                       124 mm[Hg] 

[facility] ([facility])

 

           Diastolic blood pressure 74 mm[Hg]                        74 mm[Hg]  

[facility] ([facility])

 

           Body height 61.7 [in_i]                       61.7 [in_i] [facility] ([facility])

 

                                        " 

 

           Respiratory rate 18 /min                          18 /min    [facility] (

[facility])

 

           Body temperature 97.8 [degF]                       97.8 [degF] [facility]

 ([facility])

 

           Ideal body weight 105 [lb_av]                       105 [lb_av] [facility]

T ([facility])

 

           Body height 61.7 [in_i]                       61.7 [in_i] [facility] ([facility])

 

                                        " 

 

           Body weight 144.00 [lb_av]                       144.00 [lb_av] [facility]

T ([facility])

 

           Oxygen saturation in Arterial blood by Pulse oximetry 98 %           

                  98 %       [facility] 

([facility])

 

           Ideal body weight 105 [lb_av]                       105 [lb_av] [facility]

T ([facility])

 

           Systolic blood pressure 126 mm[Hg]                       126 mm[Hg] M

[facility] ([facility])

 

           Diastolic blood pressure 72 mm[Hg]                        72 mm[Hg]  

[facility] ([facility])

 

           Body mass index (BMI) [Ratio] 26.6 kg/m2                       26.6 k

g/m2 [facility] ([facility])

 

           Heart rate 77 /min                          77 /min    [facility] ([facility])

 

           Respiratory rate 18 /min                          18 /min    [facility] (

[facility])

 

           Body temperature 97.1 [degF]                       97.1 [degF] [facility]

 ([facility])

 

           Body weight 142.00 [lb_av]                       142.00 [lb_av] [facility]

T ([facility])

 

           Systolic blood pressure 142 mm[Hg]                       142 mm[Hg] M

[facility] ([facility])

 

           Heart rate 74 /min                          74 /min    [facility] ([facility])

 

           Respiratory rate 78 /min                          78 /min    [facility] (

[facility])

 

           Body temperature 98.3 [degF]                       98.3 [degF] [facility]

 ([facility])

 

           Diastolic blood pressure 82 mm[Hg]                        82 mm[Hg]  

[facility] ([facility])

 

           Body height 61.7 [in_i]                       61.7 [in_i] [facility]

 

                                        5'70" 

 

           Body mass index (BMI) [Ratio] 26.2 kg/m2                       26.2 k

g/m2 [facility]

 

           Oxygen saturation in Arterial blood by Pulse oximetry 97 %           

                  97 %       [facility]

 

           Ideal body weight 105 [lb_av]                       105 [lb_av] [facility]

T ([facility])

 

           Systolic blood pressure 126 mm[Hg]                       126 mm[Hg] M

[facility] ([facility])

 

           Body height 61.7 [in_i]                       61.7 [in_i] [facility] ([facility])

 

                                        5'.70" 

 

           Body weight 140.12 [lb_av]                       140.12 [lb_av] [facility]

T ([facility])

 

           Body mass index (BMI) [Ratio] 25.9 kg/m2                       25.9 k

g/m2 [facility] ([facility])

 

           Heart rate 72 /min                          72 /min    [facility] ([facility])

 

           Respiratory rate 18 /min                          18 /min    [facility] (

[facility])

 

           Body temperature 98.2 [degF]                       98.2 [degF] [facility]

 ([facility])

 

           Oxygen saturation in Arterial blood by Pulse oximetry 99 %           

                  99 %       [facility] 

([facility])

 

           Ideal body weight 105 [lb_av]                       105 [lb_av] [facility]

T ([facility])

 

           Diastolic blood pressure 68 mm[Hg]                        68 mm[Hg]  

[facility] ([facility])

 

           Oxygen saturation in Arterial blood by Pulse oximetry 97 %           

                  97 %       [facility] 

([facility])

 

           Systolic blood pressure 124 mm[Hg]                       124 mm[Hg] M

[facility] ([facility])

 

           Diastolic blood pressure 72 mm[Hg]                        72 mm[Hg]  

[facility] ([facility])

 

           Body height 61.7 [in_i]                       61.7 [in_i] [facility] ([facility])

 

                                        5'1.70" 

 

           Body weight 139.12 [lb_av]                       139.12 [lb_av] [facility]

T ([facility])

 

           Body mass index (BMI) [Ratio] 25.7 kg/m2                       25.7 k

g/m2 [facility] ([facility])

 

           Heart rate 5 /min                           5 /min     [facility] ([facility])

 

           Respiratory rate 18 /min                          18 /min    [facility] (

[facility])

 

           Body temperature 98.6 [degF]                       98.6 [degF] [facility]

 ([facility])

 

           Ideal body weight 105 [lb_av]                       105 [lb_av] [facility]

T ([facility])

 

           Ideal body weight 105 [lb_av]                       105 [lb_av] [facility]

T ([facility])

 

           Oxygen saturation in Arterial blood by Pulse oximetry 97 %           

                  97 %       [facility] 

([facility])

 

           Systolic blood pressure 124 mm[Hg]                       124 mm[Hg] M

[facility] ([facility])

 

           Diastolic blood pressure 74 mm[Hg]                        74 mm[Hg]  

[facility] ([facility])

 

           Body height 61.7 [in_i]                       61.7 [in_i] [facility] ([facility])

 

                                        5'1.70" 

 

           Body weight 141.00 [lb_av]                       141.00 [lb_av] [facility]

T ([facility])

 

           Body mass index (BMI) [Ratio] 26.0 kg/m2                       26.0 k

g/m2 [facility] ([facility])

 

           Heart rate 79 /min                          79 /min    [facility] ([facility])

 

           Respiratory rate 18 /min                          18 /min    [facility]

[facility]

 

           Body temperature 98.3 [degF]                       98.3 [degF] [facility]

 ([facility])



                                                                                
                  



Patient Treatment Plan of Care

          



             Planned Activity Planned Date Details      Description  Data Source

(s)

 

                          besifloxacin 6 MG/ML Ophthalmic Suspension [Besivance]

 09/10/2020 12:00:00 AM 

VIANCA GALVEZ (Jax Perez MD [facility])

## 2021-11-27 NOTE — CCD
Continuity of Care Document (CCD)

                             Created on: 10/26/2021



Helder, Elviabar Quintana

External Reference #: [id number]

: 1935

Sex: Female



Demographics





                          Address                   [address] [place]  [address]

 

                          Home Phone                [phone]

 

                          Preferred Language        Unknown

 

                          Marital Status            

 

                          Orthodoxy Affiliation     Unknown

 

                          Race                      White

 

                          Ethnic Group              Declined to Specify/Unknown





Author





                          Author                    Elvia TOLENTINO

 

                          Organization              Unknown

 

                          Address                    [place]  [address]



 

                          Phone                     [phone]







Care Team Providers





                    Care Team Member Name Role                Phone

 

                    Brooklyn Flores D.O. AUTM                [phone]

 

                    KALYAN Bartholomew M.D. AUTM                [phone]

 

                    [facility] AUTM                [phone]

 

                    [facility] AUTM     

           [phone]

 

                    Omer Gutierrez M.D. AUTM                [phone]







Problems





                    Active Problems     Provider            Date

 

                    H/O: peptic ulcer   ELMER Kan Onset: 2019

 

                    Mixed hyperlipidemia ELMER Kan Onset: 2019

 

                    Mild recurrent major depression ELMER Kan Onset: 0

2019

 

                    Dementia            ELMER Kan Onset: 10/10/2019

 

                    Gastroesophageal reflux disease ELMER Kan Onset: 1

0/10/2019







Social History





                Type            Date            Description     Comments

 

                Birth Sex                       Unknown          

 

                ETOH Use                        Denies alcohol use  

 

                Tobacco Use     Start: Unknown  Patient has never smoked  

 

                Recreational Drug Use                 Denies Drug Use  

 

                Smoking Status  Reviewed: 21 Patient has never smoked  

 

                Exercise Type/Frequency                 Does not exercise  

 

                Sun Exposure                    Does not use sunscreen  

 

                Seat Belt/Car Seat                 Always uses seat belt  







Allergies and adverse reactions





             Active Allergies Criticality  Reaction | Severity Comments     Date

 

             Iodine       Unable to assess criticality                          

 2018







Medications





           Active Medications SIG        Qnty       Indications Ordering Provide

r Date

 

                                        Calcium 600+D High Potency              

       600-400mg-Unit Tablets           

             1 by mouth twice a day 180tabs                   KALYAN KinneyO. 2021

 

                          Prevagen Extra Strength                     20mg Capsu

les                   take

one capsule by mouth daily. 90caps                          KALYAN PalaciosO. 2021

 

                          Memantine HCL                     10mg Tablets        

           Take One Tablet

By Mouth Twice A Day 180tabs         F03.90          PETRONA Palacios.O. 

10/07/2020

 

                                        Fluticasone Propionate Nasal Spray      

               50mcg/Act Suspension     

                two sprays each nostril once daily 29.7ml          J06.9        

   PETRONA Yee.O.                            2020

 

                          Omeprazole                     40mg Capsules DR       

            take one 

capsule by mouth every day 90caps                          PETRONA Palacios.O. 2019

 

                          Diclofenac Sodium                     1% Gel          

         apply 3 grams to 

painful area of left elbow twice a day as needed 100units            M79.642    

         PETRONA Yee.O.                            04/10/2019

 

                          Citalopram Hydrobromide                     10mg Table

ts                   Take 

One Tablet By Mouth Every Day 90tabs                          Brooklyn sherman, D.O. 

 

                          Vitamin D3                     2000Unit Capsules      

             1 by mouth 

every day                                       Unknown         

 

                          Vitamin B-12                     1000mcg Tablets      

             1 by mouth 

every day                                       Unknown         

 

                          Atorvastatin Calcium                     10mg Tablets 

                  Take One

Tablet By Mouth Every Evening 90tabs                          Brooklyn sherman, D.O. 

 

                          Tylenol                     325mg Tablets             

      take 1-2 tabs daily 

as needed                                       Unknown         

 

                                        History Medications

 

                          Bactrim DS                     800-160mg Tablets      

             take one 

tablet by mouth every 12 hours for ten days 20tabs              R35.0           

    PETRONA Palacios.O.                                    10/15/2021 - 10/26/2021

 

                                        Tetanus/Diphtheria Toxoids-Adsorbed Adul

t                     2-2LF/0.5ML 

Suspension                   administer tetanus with pertussis at pharmacy. 5ml 

                

                          PETRONA Palacios.O. 2021 - 2021







Medications Administered in Office





           Medication SIG        Qnty       Indications Ordering Provider Date

 

                          Covid-19 vaccine, Unspecified                      Inj

ection                    

                                                Unknown         10/24/2021

 

                          Covid-19 vaccine, Unspecified                      Inj

ection                    

                                                Unknown         2021

 

                          Covid-19 vaccine, Unspecified                      Inj

ection                    

                                                Unknown         2021







Immunizations





             CPT Code     Status       Date         Vaccine      Lot #

 

                [id number]           Given           2021      Tetanus, Diphthe

aixa Toxoids/Acellular Pertussis Vaccine 7

Or >                                    [id number]

 

             [id number]        Given        2020   Pneumococcal Vaccine 23 [address]

nt 2Yrs Or Older  

 

                [id number]           Given           2019      Pneumococcal Con

jugate Vaccine 13 Valent For 

Intramuscular Use                        

 

             U-Flu        Given        2019   Influenza,Unspecified  







Vital Signs





                Date            Vital           Result          Comment

 

                10/26/2021  2:48pm BP Systolic     124 mmHg         

 

                    BP Diastolic        72 mmHg              

 

                    Height              61.7 inches         5'1.70"

 

                    Weight              134.00 lb            

 

                    BMI (Body Mass Index) 24.7 kg/m2           

 

                    Heart Rate          81 /min              

 

                    Respiratory Rate    18 /min              

 

                    Body Temperature    98.8 F             

 

                    O2 % BldC Oximetry  100 %                

 

                    Ideal Body Weight   105 lb               

 

                10/15/2021  2:11pm BP Systolic     92 mmHg          

 

                    BP Diastolic        58 mmHg              

 

                    Height              61.7 inches         5'1.70"

 

                    Heart Rate          80 /min              

 

                    Respiratory Rate    12 /min              

 

                    Body Temperature    98.5 F             

 

                    O2 % BldC Oximetry  95 %                 

 

                    Ideal Body Weight   105 lb               







Results





        Test    Acquired Date Facility Test    Result  H/L     Range   Note

 

                    Comprehensive Metabolic Profil 10/21/2021          [facility] Out[facility]

tient Testing (Registration)

                                        [address] [place] [address] [phone] Glucose, Fasting 103 mg/dL  High             

 

             Blood Urea Nitrogen 16 mg/dL     Normal       7-18          

 

             Creatinine For GFR 0.92 mg/dL   Normal       0.55-1.30     

 

             Glomerular Filtration Rate > 60.0       Normal       >32          1

 

             Sodium Level 139 mEq/L    Normal       136-145       

 

             Potassium Serum 4.1 mEq/L    Normal       3.5-5.1       

 

             Chloride Level 106 mEq/L    Normal               

 

             Carbon Dioxide Level 27 mEq/L     Normal       21-32         

 

             Anion Gap    6 mEq/L      Low          8-16          

 

             Calcium Level 8.7 mg/dL    Low          8.8-10.2      

 

             Ast/Sgot     21 U/L       Normal       7-37          

 

             Alt/SGPT     24 U/L       Normal       12-78         

 

             Alkaline Phosphatase 96 U/L       Normal               

 

             Bilirubin,Total 0.4 mg/dL    Normal       0.2-1.0       

 

             Total Protein 6.3 GM/DL    Low          6.4-8.2       

 

             Albumin      3.3 GM/DL    Normal       3.2-5.2       

 

             Albumin/Globulin Ratio 1.1          Low          1.2-2.2       

 

                    Influenza A/B RSV Covid Amp 10/21/2021          [facility] Outpatie

nt Testing (Registration)

                                        [address] [place] [phone] Influenza A Amplification NEGATIVE   Normal     Negati

ve   2

 

             Influenza B Amplification NEGATIVE     Normal       Negative     3

 

             RSV Amplification NEGATIVE     Normal       Negative     4

 

             Sars Covid-19 Amplification NEGATIVE     Normal       Negative     

5

 

                    CBC With Differential 10/21/2021          [facility] [place]

ting (Registration)

                                        [address] [place] [address] [phone] White Blood Count 4.9 10     Normal     4.0-10.0    

 

             Red Blood Count 4.13 10      Normal       4.00-5.40     

 

             Hemoglobin   12.8 g/dL    Normal       12.0-15.5     

 

             Hematocrit   38.3 %       Normal       36.0-47.0     

 

             Mean Corpuscular Volume 92.7 fl      Normal       80.0-96.0     

 

             Mean Corpuscular Hemoglobin 31.0 pg      Normal       27.0-33.0    

 

 

             Mean Corpuscular HGB Conc 33.4 g/dL    Normal       32.0-36.5     

 

             Red Cell Distribution Width 13.0 %       Normal       11.5-14.5    

 

 

             Platelet Count, Automated 162 10       Normal       150-450       

 

             Nucleated Red Blood Cell % 0.0 %        Normal       0-0           

 

                    Differential        10/21/2021          [facility]patient Testi

ng (Registration)

                                        [address] [place], [place] [id number] [phone] Neutrophils 57 %       Normal     28-66       

 

             Lymphocytes  25 %         Normal       16-44         

 

             Monocytes    3 %          Normal       0-5           

 

             Eosinophils  4 %          High         0-3           

 

             Atypical Lymph 11 %         High         0-5           

 

             Hypochromasia 1+           Normal                     

 

                    Laboratory test finding 10/21/2021          [facility] T

esting (Registration)

                                        [address] [place] [address] [phone] Platelet Estimate DECREASED  Normal     Normal      

 

                    Ua Routine          10/21/2021          [facility] Outpatient Testi

ng (Registration)

                                        [address] [place] [phone] Appearance, Urine HAZY       Normal     Clear       

 

             Color, Urine YELLOW       Normal       Yellow        

 

             PH,Urine     5.0 units    Normal       5.0-9.0       

 

             Specific Gravity Urine Auto 1.013        Normal       1.002-1.035  

 

 

             Protein, Urine Auto NEGATIVE mg/dL Normal       Negative      

 

             Glucose, Urine (Ua) Auto NEGATIVE mg/dL Normal       Negative      

 

             Ketone, Urine Auto TRACE mg/dL  High         Negative      

 

             Urobilinogen, Urine Auto 0.2 mg/dL    Normal       0.0-2.0       

 

             Bilirubin, Urine Auto NEGATIVE     Normal       Negative      

 

             Nitrite, Urine Auto NEGATIVE     Normal       Negative      

 

             Leukocyte Esterase, Urine Auto NEGATIVE     Normal       Negative  

    

 

             Blood, Urine Blood 1+           High         Negative      

 

             WBC, Urine Auto 0 /HPF       Normal       0-3           

 

             RBC, Urine Auto 0 /HPF       Normal       0-3           

 

             Bacteria, Urine Auto NEGATIVE     Normal       Negative      

 

             Squamous Epithelial Cell Ur AU 8 /HPF       Normal       0-6       

    

 

             Hyaline Cast, Urine Auto 0 /LPF       Normal       0-1           

 

                    Ua Routine          10/19/2021          [facility] Outpatient Testi

ng (Registration)

                                        [address] [place] [phone] Appearance, Urine HAZY       Normal     Clear       

 

             Color, Urine YELLOW       Normal       Yellow        

 

             PH,Urine     5.0 units    Normal       5.0-9.0       

 

             Specific Gravity Urine Auto 1.018        Normal       1.002-1.035  

 

 

             Protein, Urine Auto NEGATIVE mg/dL Normal       Negative      

 

             Glucose, Urine (Ua) Auto NEGATIVE mg/dL Normal       Negative      

 

             Ketone, Urine Auto NEGATIVE mg/dL Normal       Negative      

 

             Urobilinogen, Urine Auto 2.0 mg/dL    High         0.0-2.0       

 

             Bilirubin, Urine Auto NEGATIVE     Normal       Negative      

 

             Nitrite, Urine Auto NEGATIVE     Normal       Negative      

 

             Leukocyte Esterase, Urine Auto NEGATIVE     Normal       Negative  

    

 

             Blood, Urine Blood 1+           High         Negative      

 

             WBC, Urine Auto 2 /HPF       Normal       0-3           

 

             RBC, Urine Auto 7 /HPF       High         0-3           

 

             Bacteria, Urine Auto NEGATIVE     Normal       Negative      

 

             Squamous Epithelial Cell Ur AU 3 /HPF       Normal       0-6       

    

 

             Mucus, Urine SMALL        Normal       Negative      

 

             Hyaline Cast, Urine Auto 0 /LPF       Normal       0-1           

 

                    Comprehensive Metabolic Profil 10/19/2021          [facility] Outpa

tient Testing (Registration)

                                        [address] [place] [phone] Glucose, Fasting 79 mg/dL   Normal           

 

             Blood Urea Nitrogen 18 mg/dL     Normal       7-18          

 

             Creatinine For GFR 0.90 mg/dL   Normal       0.55-1.30     

 

             Glomerular Filtration Rate > 60.0       Normal       >32          6

 

             Sodium Level 139 mEq/L    Normal       136-145       

 

             Potassium Serum 4.3 mEq/L    Normal       3.5-5.1       

 

             Chloride Level 108 mEq/L    High                 

 

             Carbon Dioxide Level 27 mEq/L     Normal       21-32         

 

             Anion Gap    4 mEq/L      Low          8-16          

 

             Calcium Level 9.0 mg/dL    Normal       8.8-10.2      

 

             Ast/Sgot     18 U/L       Normal       7-37          

 

             Alt/SGPT     22 U/L       Normal       12-78         

 

             Alkaline Phosphatase 85 U/L       Normal               

 

             Bilirubin,Total 0.3 mg/dL    Normal       0.2-1.0       

 

             Total Protein 6.0 GM/DL    Low          6.4-8.2       

 

             Albumin      3.3 GM/DL    Normal       3.2-5.2       

 

             Albumin/Globulin Ratio 1.2          Normal       1.2-2.2       

 

                    CBC With Differential 10/19/2021          [facility] [place]

ting (Registration)

                                        [address] [place] [address] [phone] White Blood Count 4.5 10     Normal     4.0-10.0    

 

             Red Blood Count 4.05 10      Normal       4.00-5.40     

 

             Hemoglobin   12.7 g/dL    Normal       12.0-15.5     

 

             Hematocrit   38.1 %       Normal       36.0-47.0     

 

             Mean Corpuscular Volume 94.1 fl      Normal       80.0-96.0     

 

             Mean Corpuscular Hemoglobin 31.4 pg      Normal       27.0-33.0    

 

 

             Mean Corpuscular HGB Conc 33.3 g/dL    Normal       32.0-36.5     

 

             Red Cell Distribution Width 13.0 %       Normal       11.5-14.5    

 

 

             Platelet Count, Automated 147 10       Low          150-450       

 

             Neutrophils % 60.3 %       Normal       36.0-66.0     

 

             Lymph %      24.1 %       Normal       24.0-44.0     

 

             Mono %       9.5 %        High         2.0-8.0       

 

             Eos %        5.3 %        High         0.0-3.0       

 

             Baso %       0.4 %        Normal       0.0-1.0       

 

             Immature Granulocyte % 0.4 %        Normal       0-3.0         

 

             Nucleated Red Blood Cell % 0.0 %        Normal       0-0           

 

             Neutrophils # 2.7 10       Normal       1.5-8.5       

 

             Lymph #      1.1 10       Low          1.5-5.0       

 

             Mono #       0.4 10       Normal       0.0-0.8       

 

             Eos #        0.2 10       Normal       0.0-0.5       

 

             Baso #       0.0 10       Normal       0.0-0.2       

 

                    Laboratory test finding 10/19/2021          [facility] Outpatient T

esting (Registration)

                                        [address] [place] [phone] C Reactive Protein Quantitativ 3.08 mg/dL High       0

.00-0.30   







                          1                         Units are mL/min/1.73 m2



Chronic Kidney Disease Staging per NKF:



Stage I & II   GFR >=60       Normal to Mildly Decreased

Stage III      GFR 30-59      Moderately Decreased

Stage IV       GFR 15-29      Severely Decreased

Stage V        GFR <15        Very Little GFR Left

ESRD           GFR <15 on RRT



 

                          2                         Negative results do not prec

lude influenza or RSV virus

infection and should not be used as the sole basis for

treatment or other patient management decisions.



 

                          3                         Negative results do not prec

lude influenza or RSV virus

infection and should not be used as the sole basis for

treatment or other patient management decisions.



 

                          4                         Negative results do not prec

lude influenza or RSV virus

infection and should not be used as the sole basis for

treatment or other patient management decisions.



 

                          5                         A false negative result may 

occur if a specimen is

improperly collected, transported or handled. False negative

results may also occur if inadequate numbers of organisms

are present in the specimen.  As with any molecular test,

mutations within the target regions of [employer]

SARS-CoV-2 could affect primer and/or probe binding

resulting in failure to detect the presence of virus.  This

test cannot rule out diseases caused by other bacterial or

viral pathogens.



DISCLAIMER:

Testing was performed using the [employer] SARS-CoV-2 test.

This test was developed and its performance characteristics

determined by [employer]. This test has not been FDA cleared or

approved. This test has been authorized by FDA under an

Emergency Use Authorization (EUA). This test is only

authorized for the duration of time the declaration that

circumstances exist justifying the authorization of the

emergency use of in vitro diagnostic tests for detection of

SARS-CoV-2 virus and/or diagnosis of COVID-19 infection

under section 564(b)(1) of the Act, 21 U.S.C.

                                        360bbb-3(b)(1), unless the authorization

 is terminated or

revoked sooner.



 

                          6                         Units are mL/min/1.73 m2



Chronic Kidney Disease Staging per NKF:



Stage I & II   GFR >=60       Normal to Mildly Decreased

Stage III      GFR 30-59      Moderately Decreased

Stage IV       GFR 15-29      Severely Decreased

Stage V        GFR <15        Very Little GFR Left

ESRD           GFR <15 on RRT









Procedures





                Date            Code            Description     Status

 

                10/26/2021      20017           Office/Outpatient Established Lo

w MDM 20-29 Min Completed

 

                10/15/2021      98708           Office/Outpatient Established Lo

w MDM 20-29 Min Completed

 

                2021      42952           Office/Outpatient Established Mo

d MDM 30-39 Min Completed

 

                2021      09888           Office/Outpatient Established Lo

w MDM 20-29 Min Completed

 

                2021      63591           Office/Outpatient Established Lo

w MDM 20-29 Min Completed

 

                2020      [id number]        Mammogram       Completed

 

                2019      [id number]        Mammogram       Completed







[employer] Devices





                                        Description

 

                                        No Information Available







Encounters





           Type       Date       Location   Provider   Dx         Diagnosis

 

                Office Visit    10/26/2021  2:40p [facility] ELMER Kan               R09.89                    Oth symptoms and signs invol

ving the circ and resp systems

 

                          D72.89                    Other specified disorders of

 white blood cells

 

                Office Visit    10/15/2021  2:00p [facility] ELMER Kan               R42                       Dizziness and giddiness

 

                          R35.0                     Frequency of micturition

 

                Office Visit    2021 10:00a [facility] LEMER Kan               F33.0                     Major depressive disorder, r

ecurrent, mild

 

                          E78.2                     Mixed hyperlipidemia

 

                          K21.9                     Gastro-esophageal reflux dis

ease without esophagitis

 

                          F03.90                    Unspecified dementia without

 behavioral disturbance

 

                          Z87.11                    Personal history of peptic u

lcer disease

 

                          Z79.899                   Other long term (current) dr roberts therapy

 

                Office Visit    2021  8:30a [facility] EL[place] Kan               Z48.02                    Encounter for removal of sut

ures

 

                          W10.8xxA                  Fall (on) (from) other stair

s and steps, initial encounter

 

                          S00.83xA                  Contusion of other part of h

ead, initial encounter

 

                          Z23                       Encounter for immunization

 

                Office Visit    2021  1:30p [facility] [place]               S01.81xA                  Laceration w/o foreign body 

of oth part of head, init 

encntr

 

                          W10.8xxA                  Fall (on) (from) other stair

s and steps, initial encounter

 

                          S00.83xA                  Contusion of other part of h

ead, initial encounter







Assessments





                Date            Code            Description     Provider

 

                    10/26/2021          R09.89              Other specified symp

toms and signs involving the circulatory 

and respiratory systems                 ELMER Kan

 

                10/26/2021      D72.89          Other specified disorders of [facility]

te blood cells ELMER Kan

 

                10/15/2021      R42             Dizziness and giddiness ELMER Kan

 

                10/15/2021      R35.0           Frequency of micturition ELMER Kan

 

                2021      F33.0           Major depressive disorder, recur

rent, mild ELMER Kan

 

                2021      E78.2           Mixed hyperlipidemia ELMER Ramos

 

                2021      K21.9           Gastro-esophageal reflux disease

 without esophagitis ELMER Kan

 

                2021      F03.90          Unspecified dementia without beh

avioral disturbance ELMER Kan

 

                2021      Z87.11          Personal history of peptic ulcer

 disease ELMER Kan

 

                2021      Z79.899         Other long term (current) drug t

herapy ELMER Kan

 

                2021      Z48.02          Encounter for removal of sutures

 ELMER Kan

 

                2021      W10.8xxA        Fall (on) (from) other stairs an

d steps, initial encounter 

ELMER Kan

 

                2021      S00.83xA        Contusion of other part of head,

 initial encounter ELMER Kan

 

                2021      Z23             Encounter for immunization ELMER Soliman

 

                    2021          S01.81xA            Laceration without f

oreign body of other part of head, 

initial encounter                       ELMER Kan

 

                2021      W10.8xxA        Fall (on) (from) other stairs an

d steps, initial encounter 

ELMER Kan

 

                2021      S00.83xA        Contusion of other part of head,

 initial encounter ELMER Kan







Plan of Treatment

Future Appointment(s):* 2022 10:00 am - EL[facility] Kan at [facility]





Functional Status





                                        Description

 

                                        No Information Available







Mental Status





                                        Description

 

                                        No Information Available







Referrals





                                        Description

 

                                        No Information Available

## 2021-11-27 NOTE — HPE
HISTORY AND PHYSICAL



DATE OF ADMISSION:  11/27/2021



CHIEF COMPLAINT: Right hip intertrochanteric hip fracture.



HISTORY OF PRESENT ILLNESS:  This is an 86-year-old female who fell this

morning. She was getting out of bed. Normally she gets up with her  but

she tried this on her own. She tripped, fell over her cane, was unable to

ambulate. She presented to the [facility] with a right hip

fracture. 



PAST MEDICAL HISTORY:  Per the hospitalist includes dementia, GERD,

hyperlipidemia, depression, colon cancer, post-sigmoid colectomy, hepatic and

renal cysts, multi-joint osteoarthritis, lumbar degenerative disc disease,

right adrenal hematoma, peptic ulcer disease.



MEDICATIONS: Atorvastatin, vitamin D3, citalopram, vitamin B12, memantine,

omeprazole, Prevagen.



ALLERGIES:  Iodine, amoxicillin, NSAIDs, clavulanic acid, Naproxen, cephalexin. 



PAST SURGICAL HISTORY: IRVIN and BSO, laparotomy with colostomy reversal.



SOCIAL HISTORY: She lives with her . No smoking. Requires assistance

with ADLs by her , uses a cane.



PHYSICAL EXAMINATION:  She is a well appearing female. She is little bit

confused but orients well with her , communicates appropriately. Vital

signs stable. She is lying slightly on her left side. A Leal has been placed.

It is a closed injury. Leg a little bit short and externally rotated. Normal

sensation to the dorsum and plantar aspect of the foot. The foot is warm and

well perfused. Strong dorsalis pedis pulse. She is able to dorsiflex and

plantarflex her foot as well as wiggle her toes. 



IMAGING STUDIES: Radiographs were reviewed of the right hip and femur. This

shows what appears to be a two-part intertrochanteric hip fracture.



LABORATORY DATA: Reveals a hemoglobin of 13.2. Coagulation factors appear

normal. She is COVID negative.



ASSESSMENT AND PLAN: This is an 86-year-old female with right hip

intertrochanteric hip fracture. I discussed with her  pros and cons,

risks and benefits of nonsurgical management versus surgery. Nonsurgical

management has increased risks of DVT, pneumonia and other secondary

complications. Hip fracture surgery also has its own set of unique risks but

overall it is recommended in this situation. The risks include but are not

limited to infection, pain, stiffness, bleeding, damage to surrounding

structures, neurovascular injury, delayed, mal or nonunion, limp, abductor

weakness, anesthetic complications, neurovascular injury, blood clots, death

and other risks as well as need for further surgery. She understands, signed

the consent form for surgery. I marked the right lower extremity. We also

discussed pros and cons, risks and benefits of possible need for blood products

which include but are not limited to fever, allergic reaction, transmission of

bacteria or viruses. She understood, signed the consent form for possible need

for blood transfusion. I have communicated directly to the OR staff. The

patient has been seen by the hospitalist service and will be pending right hip

open reduction and internal fixation with a long intramedullary aicha.

## 2021-11-27 NOTE — CCD
Continuity of Care Document (CCD)

                             Created on: 10/22/2021



[place]aga, Mona Lilian

External Reference #: [id number]

: 1935

Sex: Female



Demographics





                          Address                   [address] [place] [address]  [address]

 

                          Home Phone                [phone]

 

                          Preferred Language        Unknown

 

                          Marital Status            

 

                          Episcopalian Affiliation     Unknown

 

                          Race                      White

 

                          Ethnic Group              Declined to Specify/Unknown





Author





                          Author                    Elvia FLORES D.O.

 

                          Organization              Unknown

 

                          Address                   [address] [facility] [address] [facility] [address]

[place]  [address]



 

                          Phone                     [phone]







Care Team Providers





                    Care Team Member Name Role                Phone

 

                    Brooklyn Flores D.O. AUTM                [phone]

 

                    KALYAN Bartholomew M.D. AUTM                [phone]

 

                    [facility] AUTM                [phone]

 

                    [facility] AUTM     

           [phone]

 

                    Omer Gutierrez M.D. AUTM                [phone]







Problems





                    Active Problems     Provider            Date

 

                    H/O: peptic ulcer   ELMER Kan Onset: 2019

 

                    Mixed hyperlipidemia ELMER Kan Onset: 2019

 

                    Mild recurrent major depression ELMER Kan Onset: 0

2019

 

                    Dementia            ELMER Kan Onset: 10/10/2019

 

                    Gastroesophageal reflux disease ELMER Kan Onset: 1

0/10/2019







Social History





                Type            Date            Description     Comments

 

                Birth Sex                       Unknown          

 

                ETOH Use                        Denies alcohol use  

 

                Tobacco Use     Start: Unknown  Patient has never smoked  

 

                Recreational Drug Use                 Denies Drug Use  

 

                Smoking Status  Reviewed: 21 Patient has never smoked  

 

                Exercise Type/Frequency                 Does not exercise  

 

                Sun Exposure                    Does not use sunscreen  

 

                Seat Belt/Car Seat                 Always uses seat belt  







Allergies and adverse reactions





             Active Allergies Criticality  Reaction | Severity Comments     Date

 

             Iodine       Unable to assess criticality                          

 2018







Medications





           Active Medications SIG        Qnty       Indications Ordering Provide

r Date

 

                          Bactrim DS                     800-160mg Tablets      

             take one 

tablet by mouth every 12 hours for ten days 20tabs              R35.0           

    PETRONA Palacios.O.                                    10/15/2021

 

                                        Calcium 600+D High Potency              

       600-400mg-Unit Tablets           

             1 by mouth twice a day 180tabs                   Brooklyn sherman, D.O. 2021

 

                          Prevagen Extra Strength                     20mg Capsu

les                   take

one capsule by mouth daily. 90caps                          PETRONA Palacios.O. 2021

 

                          Memantine HCL                     10mg Tablets        

           Take One Tablet

By Mouth Twice A Day 180tabs         F03.90          PETRONA Palacios.O. 

10/07/2020

 

                                        Fluticasone Propionate Nasal Spray      

               50mcg/Act Suspension     

                two sprays each nostril once daily 29.7ml          J06.9        

   PETRONA Yee.O.                            2020

 

                          Omeprazole                     40mg Capsules DR       

            take one 

capsule by mouth every day 90caps                          PETRONA Palacios.O. 2019

 

                          Diclofenac Sodium                     1% Gel          

         apply 3 grams to 

painful area of left elbow twice a day as needed 100units            M79.642    

         PETRONA Yee.O.                            04/10/2019

 

                          Citalopram Hydrobromide                     10mg Table

ts                   Take 

One Tablet By Mouth Every Day 90tabs                          Brooklyn sherman, D.O. 

 

                          Vitamin D3                     2000Unit Capsules      

             1 by mouth 

every day                                       Unknown         

 

                          Vitamin B-12                     1000mcg Tablets      

             1 by mouth 

every day                                       Unknown         

 

                          Atorvastatin Calcium                     10mg Tablets 

                  Take One

Tablet By Mouth Every Evening 90tabs                          Brooklyn sherman, D.O. 

 

                          Tylenol                     325mg Tablets             

      take 1-2 tabs daily 

as needed                                       Unknown         

 

                                        History Medications

 

                                        Tetanus/Diphtheria Toxoids-Adsorbed Adul

t                     2-2LF/0.5ML 

Suspension                   administer tetanus with pertussis at pharmacy. 5ml 

                

                          PETRONA Palacios.O. 2021 - 2021







Immunizations





             CPT Code     Status       Date         Vaccine      Lot #

 

                [id number]           Given           2021      Tetanus, Diphthe

aixa Toxoids/Acellular Pertussis Vaccine 7

Or >                                    [id number]

 

             [id number]        Given        2020   Pneumococcal Vaccine 23 [address]

nt 2Yrs Or Older  

 

                [id number]           Given           2019      Pneumococcal Con

jugate Vaccine 13 Valent For 

Intramuscular Use                        

 

             U-Flu        Given        2019   Influenza,Unspecified  







Vital Signs





                Date            Vital           Result          Comment

 

                10/15/2021  2:11pm BP Systolic     92 mmHg          

 

                    BP Diastolic        58 mmHg              

 

                    Height              61.7 inches         5'1.70"

 

                    Heart Rate          80 /min              

 

                    Respiratory Rate    12 /min              

 

                    Body Temperature    98.5 F             

 

                    O2 % BldC Oximetry  95 %                 

 

                    Ideal Body Weight   105 lb               

 

                2021 10:03am BP Systolic     124 mmHg         

 

                    BP Diastolic        74 mmHg              

 

                    Height              61.7 inches         5'1.70"

 

                    Weight              144.25 lb            

 

                    BMI (Body Mass Index) 26.6 kg/m2           

 

                    Heart Rate          74 /min              

 

                    Respiratory Rate    18 /min              

 

                    Body Temperature    97.8 F             

 

                    O2 % BldC Oximetry  95 %                 

 

                    Ideal Body Weight   105 lb               







Results





        Test    Acquired Date Facility Test    Result  H/L     Range   Note

 

                    Influenza A/B RSV Covid Amp 10/21/2021          [facility]

[facility] Testing (Registration)

                                        [address] [place] [address] [phone] Influenza A Amplification NEGATIVE   Normal     Negati

ve   1

 

             Influenza B Amplification NEGATIVE     Normal       Negative     2

 

             RSV Amplification NEGATIVE     Normal       Negative     3

 

             Sars Covid-19 Amplification NEGATIVE     Normal       Negative     

4

 

                    CBC With Differential 10/21/2021          [facility] [place]

ting (Registration)

                                        [address] [place] [address] [phone] White Blood Count 4.9 10     Normal     4.0-10.0    

 

             Red Blood Count 4.13 10      Normal       4.00-5.40     

 

             Hemoglobin   12.8 g/dL    Normal       12.0-15.5     

 

             Hematocrit   38.3 %       Normal       36.0-47.0     

 

             Mean Corpuscular Volume 92.7 fl      Normal       80.0-96.0     

 

             Mean Corpuscular Hemoglobin 31.0 pg      Normal       27.0-33.0    

 

 

             Mean Corpuscular HGB Conc 33.4 g/dL    Normal       32.0-36.5     

 

             Red Cell Distribution Width 13.0 %       Normal       11.5-14.5    

 

 

             Platelet Count, Automated 162 10       Normal       150-450       

 

             Nucleated Red Blood Cell % 0.0 %        Normal       0-0           

 

                    Differential        10/21/2021          [facility]

ng (Registration)

                                        [address] [place] [address] [phone] Neutrophils 57 %       Normal     28-66       

 

             Lymphocytes  25 %         Normal       16-44         

 

             Monocytes    3 %          Normal       0-5           

 

             Eosinophils  4 %          High         0-3           

 

             Atypical Lymph 11 %         High         0-5           

 

             Hypochromasia 1+           Normal                     

 

                    Laboratory test finding 10/21/2021          [facility]

esting (Registration)

                                        [address] [place] [address] [phone] Platelet Estimate DECREASED  Normal     Normal      

 

                    Ua Routine          10/21/2021          [facility]

ng (Registration)

                                        [address] [place] [address] [phone] Appearance, Urine HAZY       Normal     Clear       

 

             Color, Urine YELLOW       Normal       Yellow        

 

             PH,Urine     5.0 units    Normal       5.0-9.0       

 

             Specific Gravity Urine Auto 1.013        Normal       1.002-1.035  

 

 

             Protein, Urine Auto NEGATIVE mg/dL Normal       Negative      

 

             Glucose, Urine (Ua) Auto NEGATIVE mg/dL Normal       Negative      

 

             Ketone, Urine Auto TRACE mg/dL  High         Negative      

 

             Urobilinogen, Urine Auto 0.2 mg/dL    Normal       0.0-2.0       

 

             Bilirubin, Urine Auto NEGATIVE     Normal       Negative      

 

             Nitrite, Urine Auto NEGATIVE     Normal       Negative      

 

             Leukocyte Esterase, Urine Auto NEGATIVE     Normal       Negative  

    

 

             Blood, Urine Blood 1+           High         Negative      

 

             WBC, Urine Auto 0 /HPF       Normal       0-3           

 

             RBC, Urine Auto 0 /HPF       Normal       0-3           

 

             Bacteria, Urine Auto NEGATIVE     Normal       Negative      

 

             Squamous Epithelial Cell Ur AU 8 /HPF       Normal       0-6       

    

 

             Hyaline Cast, Urine Auto 0 /LPF       Normal       0-1           

 

                    Comprehensive Metabolic Profil 10/21/2021          [facility]

[facility] Testing (Registration)

                                        [address] [place] [address] [phone] Glucose, Fasting 103 mg/dL  High             

 

             Blood Urea Nitrogen 16 mg/dL     Normal       7-18          

 

             Creatinine For GFR 0.92 mg/dL   Normal       0.55-1.30     

 

             Glomerular Filtration Rate > 60.0       Normal       >32          5

 

             Sodium Level 139 mEq/L    Normal       136-145       

 

             Potassium Serum 4.1 mEq/L    Normal       3.5-5.1       

 

             Chloride Level 106 mEq/L    Normal               

 

             Carbon Dioxide Level 27 mEq/L     Normal       21-32         

 

             Anion Gap    6 mEq/L      Low          8-16          

 

             Calcium Level 8.7 mg/dL    Low          8.8-10.2      

 

             Ast/Sgot     21 U/L       Normal       7-37          

 

             Alt/SGPT     24 U/L       Normal       12-78         

 

             Alkaline Phosphatase 96 U/L       Normal               

 

             Bilirubin,Total 0.4 mg/dL    Normal       0.2-1.0       

 

             Total Protein 6.3 GM/DL    Low          6.4-8.2       

 

             Albumin      3.3 GM/DL    Normal       3.2-5.2       

 

             Albumin/Globulin Ratio 1.1          Low          1.2-2.2       

 

                    CBC With Differential 10/19/2021          [facility]

ting (Registration)

                                        [address] [place] [address] [phone] White Blood Count 4.5 10     Normal     4.0-10.0    

 

             Red Blood Count 4.05 10      Normal       4.00-5.40     

 

             Hemoglobin   12.7 g/dL    Normal       12.0-15.5     

 

             Hematocrit   38.1 %       Normal       36.0-47.0     

 

             Mean Corpuscular Volume 94.1 fl      Normal       80.0-96.0     

 

             Mean Corpuscular Hemoglobin 31.4 pg      Normal       27.0-33.0    

 

 

             Mean Corpuscular HGB Conc 33.3 g/dL    Normal       32.0-36.5     

 

             Red Cell Distribution Width 13.0 %       Normal       11.5-14.5    

 

 

             Platelet Count, Automated 147 10       Low          150-450       

 

             Neutrophils % 60.3 %       Normal       36.0-66.0     

 

             Lymph %      24.1 %       Normal       24.0-44.0     

 

             Mono %       9.5 %        High         2.0-8.0       

 

             Eos %        5.3 %        High         0.0-3.0       

 

             Baso %       0.4 %        Normal       0.0-1.0       

 

             Immature Granulocyte % 0.4 %        Normal       0-3.0         

 

             Nucleated Red Blood Cell % 0.0 %        Normal       0-0           

 

             Neutrophils # 2.7 10       Normal       1.5-8.5       

 

             Lymph #      1.1 10       Low          1.5-5.0       

 

             Mono #       0.4 10       Normal       0.0-0.8       

 

             Eos #        0.2 10       Normal       0.0-0.5       

 

             Baso #       0.0 10       Normal       0.0-0.2       

 

                    Laboratory test finding 10/19/2021          [facility]

esting (Registration)

                                        [address] [place] [address] [phone] C Reactive Protein Quantitativ 3.08 mg/dL High       0

.00-0.30   

 

                    Comprehensive Metabolic Profil 10/19/2021          [facility]

[facility] Testing (Registration)

                                        [address] [place] [address] [phone] Glucose, Fasting 79 mg/dL   Normal           

 

             Blood Urea Nitrogen 18 mg/dL     Normal       7-18          

 

             Creatinine For GFR 0.90 mg/dL   Normal       0.55-1.30     

 

             Glomerular Filtration Rate > 60.0       Normal       >32          6

 

             Sodium Level 139 mEq/L    Normal       136-145       

 

             Potassium Serum 4.3 mEq/L    Normal       3.5-5.1       

 

             Chloride Level 108 mEq/L    High                 

 

             Carbon Dioxide Level 27 mEq/L     Normal       21-32         

 

             Anion Gap    4 mEq/L      Low          8-16          

 

             Calcium Level 9.0 mg/dL    Normal       8.8-10.2      

 

             Ast/Sgot     18 U/L       Normal       7-37          

 

             Alt/SGPT     22 U/L       Normal       12-78         

 

             Alkaline Phosphatase 85 U/L       Normal               

 

             Bilirubin,Total 0.3 mg/dL    Normal       0.2-1.0       

 

             Total Protein 6.0 GM/DL    Low          6.4-8.2       

 

             Albumin      3.3 GM/DL    Normal       3.2-5.2       

 

             Albumin/Globulin Ratio 1.2          Normal       1.2-2.2       

 

                    Ua Routine          10/19/2021          [facility]

 (Registration)

                                        [address] [place] [address]

           [phone] Appearance, Urine HAZY       Normal     Clear       

 

             Color, Urine YELLOW       Normal       Yellow        

 

             PH,Urine     5.0 units    Normal       5.0-9.0       

 

             Specific Gravity Urine Auto 1.018        Normal       1.002-1.035  

 

 

             Protein, Urine Auto NEGATIVE mg/dL Normal       Negative      

 

             Glucose, Urine (Ua) Auto NEGATIVE mg/dL Normal       Negative      

 

             Ketone, Urine Auto NEGATIVE mg/dL Normal       Negative      

 

             Urobilinogen, Urine Auto 2.0 mg/dL    High         0.0-2.0       

 

             Bilirubin, Urine Auto NEGATIVE     Normal       Negative      

 

             Nitrite, Urine Auto NEGATIVE     Normal       Negative      

 

             Leukocyte Esterase, Urine Auto NEGATIVE     Normal       Negative  

    

 

             Blood, Urine Blood 1+           High         Negative      

 

             WBC, Urine Auto 2 /HPF       Normal       0-3           

 

             RBC, Urine Auto 7 /HPF       High         0-3           

 

             Bacteria, Urine Auto NEGATIVE     Normal       Negative      

 

             Squamous Epithelial Cell Ur AU 3 /HPF       Normal       0-6       

    

 

             Mucus, Urine SMALL        Normal       Negative      

 

             Hyaline Cast, Urine Auto 0 /LPF       Normal       0-1           

 

                    Ua Routine          10/18/2021          [facility]

[facility]

             Ua Specific Gravity <pending>                               

 

             Ua PH Test Strip <pending>                               

 

             Ua Color     <pending>                               

 

             Ua Appearance <pending>                               

 

             Ua WBC       <pending>                               

 

             Ua Protein   <pending>                               

 

             Urine Glucose QL <pending>                               

 

             Ua Ketones   <pending>                               

 

             Ua Bilirubin <pending>                               

 

             Urine Urobilinogen QN TS <pending>                               

 

             Urine Nitrite QL TS <pending>                               

 

             Ua Occult Blood <pending>                               

 

                    Laboratory test finding 10/18/2021          [facility]

             Culture Urine Routine <pending>                               

 

                    CMP                 10/18/2021          [facility]

             Albumin Serum/Plasma <pending>                               

 

             Alt - SGPT   <pending>                               

 

             Calcium Ser/Plasma Mass/Vol <pending>                              

 

 

             Carbon Dioxide Ser/Plasm <pending>                               

 

             Chloride Serum/Plasma <pending>                               

 

             Creatinine Serum Mass/Vol <pending>                               

 

             Glucose Serum <pending>                               

 

             Alkaline Phosphatase <pending>                               

 

             Potassium    <pending>                               

 

             Protein Total <pending>                               

 

             Sodium       <pending>                               

 

             Ast - Sgot   <pending>                               

 

             BUN - Urea Nitrogen <pending>                               

 

                    CBC W/Auto Differential 10/18/2021          [facility]

             White Blood Count Ser Auto CNT <pending>                           

    

 

             RBC Red Blood Count <pending>                               

 

             Hemoglobin Blood <pending>                               

 

             Hematocrit   <pending>                               

 

             MCV (Corpuscular Volume) <pending>                               

 

             MCH (Corpuscular Hemoglobin) <pending>                             

  

 

             MCHC (Corpuscular Hemog Conc) <pending>                            

   

 

             RDW          <pending>                               

 

             Platelet Count Blood Auto CNT <pending>                            

   

 

             MPV          <pending>                               

 

             Neutrophils  <pending>                               

 

             Fluid Bands  <pending>                               

 

             Fluid Lymphocytes <pending>                               

 

             Monocytes    <pending>                               

 

             Fluid Body Eosinophils <pending>                               

 

             Basophils %  <pending>                               

 

             Absolute Basophils <pending>                               

 

             Absolute Eosinophils <pending>                               

 

             Absolute Lymphocytes <pending>                               

 

             Absolute Monocytes <pending>                               

 

             Absolute Neutrophils Auto CNT <pending>                            

   

 

                    Laboratory test finding 10/18/2021          [facility]

             CRP (High Sensitivity) <pending>                               







                          1                         Negative results do not prec

lude influenza or RSV virus

infection and should not be used as the sole basis for

treatment or other patient management decisions.



 

                          2                         Negative results do not prec

lude influenza or RSV virus

infection and should not be used as the sole basis for

treatment or other patient management decisions.



 

                          3                         Negative results do not prec

lude influenza or RSV virus

infection and should not be used as the sole basis for

treatment or other patient management decisions.



 

                          4                         A false negative result may 

occur if a specimen is

improperly collected, transported or handled. False negative

results may also occur if inadequate numbers of organisms

are present in the specimen.  As with any molecular test,

mutations within the target regions of Xpert Xpress

SARS-CoV-2 could affect primer and/or probe binding

resulting in failure to detect the presence of virus.  This

test cannot rule out diseases caused by other bacterial or

viral pathogens.



DISCLAIMER:

Testing was performed using the [employer] SARS-CoV-2 test.

This test was developed and its performance characteristics

determined by [employer]. This test has not been FDA cleared or

approved. This test has been authorized by FDA under an

Emergency Use Authorization (EUA). This test is only

authorized for the duration of time the declaration that

circumstances exist justifying the authorization of the

emergency use of in vitro diagnostic tests for detection of

SARS-CoV-2 virus and/or diagnosis of COVID-19 infection

under section 564(b)(1) of the Act, 21 U.S.C.

                                        360bbb-3(b)(1), unless the authorization

 is terminated or

revoked sooner.



 

                          5                         Units are mL/min/1.73 m2



Chronic Kidney Disease Staging per [facility]:



Stage I & II   GFR >=60       Normal to Mildly Decreased

Stage III      GFR 30-59      Moderately Decreased

Stage IV       GFR 15-29      Severely Decreased

Stage V        GFR <15        Very Little GFR Left

ESRD           GFR <15 on RRT



 

                          6                         Units are mL/min/1.73 m2



Chronic Kidney Disease Staging per [facility]:



Stage I & II   GFR >=60       Normal to Mildly Decreased

Stage III      GFR 30-59      Moderately Decreased

Stage IV       GFR 15-29      Severely Decreased

Stage V        GFR <15        Very Little GFR Left

ESRD           GFR <15 on RRT









Procedures





                Date            Code            Description     Status

 

                10/15/2021      39120           Office/Outpatient Established Lo

w MDM 20-29 Min Completed

 

                2021      00278           Office/Outpatient Established Mo

d MDM 30-39 Min Completed

 

                2021      00165           Office/Outpatient Established Lo

w MDM 20-29 Min Completed

 

                2021      64534           Office/Outpatient Established Lo

w MDM 20-29 Min Completed

 

                2020      [id number]        Mammogram       Completed

 

                2019      [id number]        Mammogram       Completed







[employer]





                                        Description

 

                                        No Information Available







Encounters





           Type       Date       Location   Provider   Dx         Diagnosis

 

                Office Visit    10/15/2021  2:00p [facility]               R42                       Dizziness and giddiness

 

                          R35.0                     Frequency of micturition

 

                Office Visit    2021 10:00a [facility] [place]               F33.0                     Major depressive disorder, r

ecurrent, mild

 

                          E78.2                     Mixed hyperlipidemia

 

                          K21.9                     Gastro-esophageal reflux dis

ease without esophagitis

 

                          F03.90                    Unspecified dementia without

 behavioral disturbance

 

                          Z87.11                    Personal history of peptic u

lcer disease

 

                          Z79.899                   Other long term (current) dr

armando therapy

 

                Office Visit    2021  8:30a [facility] [place]               Z48.02                    Encounter for removal of sut

ures

 

                          W10.8xxA                  Fall (on) (from) other stair

s and steps, initial encounter

 

                          S00.83xA                  Contusion of other part of h

ead, initial encounter

 

                          Z23                       Encounter for immunization

 

                Office Visit    2021  1:30p [facility] [place]               S01.81xA                  Laceration w/o foreign body 

of oth part of head, init 

encntr

 

                          W10.8xxA                  Fall (on) (from) other stair

s and steps, initial encounter

 

                          S00.83xA                  Contusion of other part of h

ead, initial encounter







Assessments





                Date            Code            Description     Provider

 

                10/15/2021      R42             Dizziness and giddiness [facility] Kan

 

                10/15/2021      R35.0           Frequency of micturition [facility]

 

                2021      F33.0           Major depressive disorder, recur

rent, mild [facility] Kan

 

                2021      E78.2           Mixed hyperlipidemia [facility] Ramos

 

                2021      K21.9           Gastro-esophageal reflux disease

 without esophagitis [facility]

 

                2021      F03.90          Unspecified dementia without beh

avioral disturbance [facility] Kan

 

                2021      Z87.11          Personal history of peptic ulcer

 disease [facility] Kan

 

                2021      Z79.899         Other long term (current) drug t

herapy [facility]

 

                2021      Z48.02          Encounter for removal of sutures

 [facility]

 

                2021      W10.8xxA        Fall (on) (from) other stairs an

d steps, initial encounter 

[facility] Kan

 

                2021      S00.83xA        Contusion of other part of head,

 initial encounter [facility] Kan

 

                2021      Z23             Encounter for immunization Ramiro

[facility] Morel

 

                    2021          S01.81xA            Laceration without f

oreign body of other part of head, 

initial encounter                       [facility] Kan

 

                2021      W10.8xxA        Fall (on) (from) other stairs an

d steps, initial encounter 

[facility] Kan

 

                2021      S00.83xA        Contusion of other part of head,

 initial encounter [facility] Kan







Plan of Treatment

Future Appointment(s):* 2022 10:00 am - [facility] at [facility]

10/15/2021 - [place] Kan* R42 Dizziness and giddiness* Comments:* Please
  make sure your drink more fluids and increase your salt intake.





* R35.0 Frequency of micturition* New Medication:* Bactrim -160 mg - take 
  one tablet by mouth every 12 hours for ten days



* Comments:* increased urinary frequency, confusion, headache and will treat 
  presumptively for a UTI









Functional Status





                                        Description

 

                                        No Information Available







Mental Status





                                        Description

 

                                        No Information Available







Referrals





                                        Description

 

                                        No Information Available

## 2021-11-27 NOTE — CCD
Continuity of Care Document (CCD)

                             Created on: 2021



Helder, Elviabar Quintana

External Reference #: [id number]

: 1935

Sex: Female



Demographics





                          Address                   [address] [place]  [address]

 

                          Home Phone                [phone]

 

                          Preferred Language        Unknown

 

                          Marital Status            

 

                          Yazidi Affiliation     Unknown

 

                          Race                      White

 

                          Ethnic Group              Declined to Specify/Unknown





Author





                          Author                    Elvia TOLENTINO

 

                          Organization              Unknown

 

                          [place]  [address]



 

                          Phone                     [phone]







Care Team Providers





                    Care Team Member Name Role                Phone

 

                    Brooklyn Flores D.O. AUTM                [phone]

 

                    KALYAN Bartholomew M.D. AUTM                [phone]

 

                    [facility] AUTM                [phone]

 

                    [facility] AUTM     

           [phone]

 

                    Omer Gutierrez M.D. AUTM                [phone]







Problems





                    Active Problems     Provider            Date

 

                    H/O: peptic ulcer   ELMER Kan Onset: 2019

 

                    Mixed hyperlipidemia ELMER Kan Onset: 2019

 

                    Mild recurrent major depression ELMER Kan Onset: 0

2019

 

                    Dementia            ELMER Kan Onset: 10/10/2019

 

                    Gastroesophageal reflux disease ELMER Kan Onset: 1

0/10/2019







Social History





                Type            Date            Description     Comments

 

                Birth Sex                       Unknown          

 

                ETOH Use                        Denies alcohol use  

 

                Tobacco Use     Start: Unknown  Patient has never smoked  

 

                Recreational Drug Use                 Denies Drug Use  

 

                Smoking Status  Reviewed: 21 Patient has never smoked  

 

                Exercise Type/Frequency                 Does not exercise  

 

                Sun Exposure                    Does not use sunscreen  

 

                Seat Belt/Car Seat                 Always uses seat belt  







Allergies and adverse reactions





             Active Allergies Criticality  Reaction | Severity Comments     Date

 

             Iodine       Unable to assess criticality                          

 2018







Medications





           Active Medications SIG        Qnty       Indications Ordering Provide

r Date

 

                                        Calcium 600+D High Potency              

       600-400mg-Unit Tablets           

             1 by mouth twice a day 180tabs                   KALYAN KinneyO. 2021

 

                          Prevagen Extra Strength                     20mg Capsu

les                   take

one capsule by mouth daily. 90caps                          KALYAN PalaciosO. 2021

 

                          Memantine HCL                     10mg Tablets        

           Take One Tablet

By Mouth Twice A Day 180tabs         F03.90          PETRONA Palacios.O. 

10/07/2020

 

                                        Fluticasone Propionate Nasal Spray      

               50mcg/Act Suspension     

                two sprays each nostril once daily 29.7ml          J06.9        

   PETRONA Yee.O.                            2020

 

                          Omeprazole                     40mg Capsules DR       

            take one 

capsule by mouth every day 90caps                          PETRONA Palacios.O. 2019

 

                          Diclofenac Sodium                     1% Gel          

         apply 3 grams to 

painful area of left elbow twice a day as needed 100units            M79.642    

         PETRONA Yee.O.                            04/10/2019

 

                          Citalopram Hydrobromide                     10mg Table

ts                   Take 

One Tablet By Mouth Every Day 90tabs                          Brooklyn sherman, D.O. 

 

                          Vitamin D3                     2000Unit Capsules      

             1 by mouth 

every day                                       Unknown         

 

                          Vitamin B-12                     1000mcg Tablets      

             1 by mouth 

every day                                       Unknown         

 

                          Atorvastatin Calcium                     10mg Tablets 

                  Take One

Tablet By Mouth Every Evening 90tabs                          Brooklyn sherman, D.O. 

 

                          Tylenol                     325mg Tablets             

      take 1-2 tabs daily 

as needed                                       Unknown         

 

                                        History Medications

 

                          Bactrim DS                     800-160mg Tablets      

             take one 

tablet by mouth every 12 hours for ten days 20tabs              R35.0           

    PETRONA Palacios.O.                                    10/15/2021 - 10/26/2021

 

                                        Tetanus/Diphtheria Toxoids-Adsorbed Adul

t                     2-2LF/0.5ML 

Suspension                   administer tetanus with pertussis at pharmacy. 5ml 

                

                          PETRONA Palacios.O. 2021 - 2021







Medications Administered in Office





           Medication SIG        Qnty       Indications Ordering Provider Date

 

                          Covid-19 vaccine, Unspecified                      Inj

ection                    

                                                Unknown         10/24/2021

 

                          Covid-19 vaccine, Unspecified                      Inj

ection                    

                                                Unknown         2021

 

                          Covid-19 vaccine, Unspecified                      Inj

ection                    

                                                Unknown         2021







Immunizations





             CPT Code     Status       Date         Vaccine      Lot #

 

                [id number]           Given           2021      Tetanus, Diphthe

aixa Toxoids/Acellular Pertussis Vaccine 7

Or >                                    [id number]

 

             [id number]        Given        2020   Pneumococcal Vaccine 23 [address]

nt 2Yrs Or Older  

 

                [id number]           Given           2019      Pneumococcal Con

jugate Vaccine 13 Valent For 

Intramuscular Use                        

 

             U-Flu        Given        2019   Influenza,Unspecified  







Vital Signs





                Date            Vital           Result          Comment

 

                10/26/2021  2:48pm BP Systolic     124 mmHg         

 

                    BP Diastolic        72 mmHg              

 

                    Height              61.7 inches         5'1.70"

 

                    Weight              134.00 lb            

 

                    BMI (Body Mass Index) 24.7 kg/m2           

 

                    Heart Rate          81 /min              

 

                    Respiratory Rate    18 /min              

 

                    Body Temperature    98.8 F             

 

                    O2 % BldC Oximetry  100 %                

 

                    Ideal Body Weight   105 lb               

 

                10/15/2021  2:11pm BP Systolic     92 mmHg          

 

                    BP Diastolic        58 mmHg              

 

                    Height              61.7 inches         5'1.70"

 

                    Heart Rate          80 /min              

 

                    Respiratory Rate    12 /min              

 

                    Body Temperature    98.5 F             

 

                    O2 % BldC Oximetry  95 %                 

 

                    Ideal Body Weight   105 lb               







Results





        Test    Acquired Date Facility Test    Result  H/L     Range   Note

 

                    Comprehensive Metabolic Profil 10/21/2021          [facility]

[facility] Testing (Registration)

                                        [address] [place] [address] [phone] Glucose, Fasting 103 mg/dL  High             

 

             Blood Urea Nitrogen 16 mg/dL     Normal       7-18          

 

             Creatinine For GFR 0.92 mg/dL   Normal       0.55-1.30     

 

             Glomerular Filtration Rate > 60.0       Normal       >32          1

 

             Sodium Level 139 mEq/L    Normal       136-145       

 

             Potassium Serum 4.1 mEq/L    Normal       3.5-5.1       

 

             Chloride Level 106 mEq/L    Normal               

 

             Carbon Dioxide Level 27 mEq/L     Normal       21-32         

 

             Anion Gap    6 mEq/L      Low          8-16          

 

             Calcium Level 8.7 mg/dL    Low          8.8-10.2      

 

             Ast/Sgot     21 U/L       Normal       7-37          

 

             Alt/SGPT     24 U/L       Normal       12-78         

 

             Alkaline Phosphatase 96 U/L       Normal               

 

             Bilirubin,Total 0.4 mg/dL    Normal       0.2-1.0       

 

             Total Protein 6.3 GM/DL    Low          6.4-8.2       

 

             Albumin      3.3 GM/DL    Normal       3.2-5.2       

 

             Albumin/Globulin Ratio 1.1          Low          1.2-2.2       

 

                    Influenza A/B RSV Covid Amp 10/21/2021          [facility]

[facility] Testing (Registration)

                                        [address] [place] [phone] Influenza A Amplification NEGATIVE   Normal     Negati

ve   2

 

             Influenza B Amplification NEGATIVE     Normal       Negative     3

 

             RSV Amplification NEGATIVE     Normal       Negative     4

 

             Sars Covid-19 Amplification NEGATIVE     Normal       Negative     

5

 

                    CBC With Differential 10/21/2021          [facility] [place]

ting (Registration)

                                        [address] [place] [address] [phone] White Blood Count 4.9 10     Normal     4.0-10.0    

 

             Red Blood Count 4.13 10      Normal       4.00-5.40     

 

             Hemoglobin   12.8 g/dL    Normal       12.0-15.5     

 

             Hematocrit   38.3 %       Normal       36.0-47.0     

 

             Mean Corpuscular Volume 92.7 fl      Normal       80.0-96.0     

 

             Mean Corpuscular Hemoglobin 31.0 pg      Normal       27.0-33.0    

 

 

             Mean Corpuscular HGB Conc 33.4 g/dL    Normal       32.0-36.5     

 

             Red Cell Distribution Width 13.0 %       Normal       11.5-14.5    

 

 

             Platelet Count, Automated 162 10       Normal       150-450       

 

             Nucleated Red Blood Cell % 0.0 %        Normal       0-0           

 

                    Differential        10/21/2021          [facility]

ng (Registration)

                                        [address] [place], [place] [id number] [phone] Neutrophils 57 %       Normal     28-66       

 

             Lymphocytes  25 %         Normal       16-44         

 

             Monocytes    3 %          Normal       0-5           

 

             Eosinophils  4 %          High         0-3           

 

             Atypical Lymph 11 %         High         0-5           

 

             Hypochromasia 1+           Normal                     

 

                    Laboratory test finding 10/21/2021          [facility]

esting (Registration)

                                        [address] [place] [address] [phone] Platelet Estimate DECREASED  Normal     Normal      

 

                    Ua Routine          10/21/2021          [facility]

ng (Registration)

                                        [address] [place] [address] [phone] Appearance, Urine HAZY       Normal     Clear       

 

             Color, Urine YELLOW       Normal       Yellow        

 

             PH,Urine     5.0 units    Normal       5.0-9.0       

 

             Specific Gravity Urine Auto 1.013        Normal       1.002-1.035  

 

 

             Protein, Urine Auto NEGATIVE mg/dL Normal       Negative      

 

             Glucose, Urine (Ua) Auto NEGATIVE mg/dL Normal       Negative      

 

             Ketone, Urine Auto TRACE mg/dL  High         Negative      

 

             Urobilinogen, Urine Auto 0.2 mg/dL    Normal       0.0-2.0       

 

             Bilirubin, Urine Auto NEGATIVE     Normal       Negative      

 

             Nitrite, Urine Auto NEGATIVE     Normal       Negative      

 

             Leukocyte Esterase, Urine Auto NEGATIVE     Normal       Negative  

    

 

             Blood, Urine Blood 1+           High         Negative      

 

             WBC, Urine Auto 0 /HPF       Normal       0-3           

 

             RBC, Urine Auto 0 /HPF       Normal       0-3           

 

             Bacteria, Urine Auto NEGATIVE     Normal       Negative      

 

             Squamous Epithelial Cell Ur AU 8 /HPF       Normal       0-6       

    

 

             Hyaline Cast, Urine Auto 0 /LPF       Normal       0-1           

 

                    Ua Routine          10/19/2021          [facility]

ng (Registration)

                                        [address] [place] [phone] Appearance, Urine HAZY       Normal     Clear       

 

             Color, Urine YELLOW       Normal       Yellow        

 

             PH,Urine     5.0 units    Normal       5.0-9.0       

 

             Specific Gravity Urine Auto 1.018        Normal       1.002-1.035  

 

 

             Protein, Urine Auto NEGATIVE mg/dL Normal       Negative      

 

             Glucose, Urine (Ua) Auto NEGATIVE mg/dL Normal       Negative      

 

             Ketone, Urine Auto NEGATIVE mg/dL Normal       Negative      

 

             Urobilinogen, Urine Auto 2.0 mg/dL    High         0.0-2.0       

 

             Bilirubin, Urine Auto NEGATIVE     Normal       Negative      

 

             Nitrite, Urine Auto NEGATIVE     Normal       Negative      

 

             Leukocyte Esterase, Urine Auto NEGATIVE     Normal       Negative  

    

 

             Blood, Urine Blood 1+           High         Negative      

 

             WBC, Urine Auto 2 /HPF       Normal       0-3           

 

             RBC, Urine Auto 7 /HPF       High         0-3           

 

             Bacteria, Urine Auto NEGATIVE     Normal       Negative      

 

             Squamous Epithelial Cell Ur AU 3 /HPF       Normal       0-6       

    

 

             Mucus, Urine SMALL        Normal       Negative      

 

             Hyaline Cast, Urine Auto 0 /LPF       Normal       0-1           

 

                    Comprehensive Metabolic Profil 10/19/2021          [facility]

tient Testing (Registration)

                                        [address] [place] [address] [phone] Glucose, Fasting 79 mg/dL   Normal           

 

             Blood Urea Nitrogen 18 mg/dL     Normal       7-18          

 

             Creatinine For GFR 0.90 mg/dL   Normal       0.55-1.30     

 

             Glomerular Filtration Rate > 60.0       Normal       >32          6

 

             Sodium Level 139 mEq/L    Normal       136-145       

 

             Potassium Serum 4.3 mEq/L    Normal       3.5-5.1       

 

             Chloride Level 108 mEq/L    High                 

 

             Carbon Dioxide Level 27 mEq/L     Normal       21-32         

 

             Anion Gap    4 mEq/L      Low          8-16          

 

             Calcium Level 9.0 mg/dL    Normal       8.8-10.2      

 

             Ast/Sgot     18 U/L       Normal       7-37          

 

             Alt/SGPT     22 U/L       Normal       12-78         

 

             Alkaline Phosphatase 85 U/L       Normal               

 

             Bilirubin,Total 0.3 mg/dL    Normal       0.2-1.0       

 

             Total Protein 6.0 GM/DL    Low          6.4-8.2       

 

             Albumin      3.3 GM/DL    Normal       3.2-5.2       

 

             Albumin/Globulin Ratio 1.2          Normal       1.2-2.2       

 

                    CBC With Differential 10/19/2021          [facility]

ting (Registration)

                                        [address] [place] [address] [phone] White Blood Count 4.5 10     Normal     4.0-10.0    

 

             Red Blood Count 4.05 10      Normal       4.00-5.40     

 

             Hemoglobin   12.7 g/dL    Normal       12.0-15.5     

 

             Hematocrit   38.1 %       Normal       36.0-47.0     

 

             Mean Corpuscular Volume 94.1 fl      Normal       80.0-96.0     

 

             Mean Corpuscular Hemoglobin 31.4 pg      Normal       27.0-33.0    

 

 

             Mean Corpuscular HGB Conc 33.3 g/dL    Normal       32.0-36.5     

 

             Red Cell Distribution Width 13.0 %       Normal       11.5-14.5    

 

 

             Platelet Count, Automated 147 10       Low          150-450       

 

             Neutrophils % 60.3 %       Normal       36.0-66.0     

 

             Lymph %      24.1 %       Normal       24.0-44.0     

 

             Mono %       9.5 %        High         2.0-8.0       

 

             Eos %        5.3 %        High         0.0-3.0       

 

             Baso %       0.4 %        Normal       0.0-1.0       

 

             Immature Granulocyte % 0.4 %        Normal       0-3.0         

 

             Nucleated Red Blood Cell % 0.0 %        Normal       0-0           

 

             Neutrophils # 2.7 10       Normal       1.5-8.5       

 

             Lymph #      1.1 10       Low          1.5-5.0       

 

             Mono #       0.4 10       Normal       0.0-0.8       

 

             Eos #        0.2 10       Normal       0.0-0.5       

 

             Baso #       0.0 10       Normal       0.0-0.2       

 

                    Laboratory test finding 10/19/2021          [facility]

esting (Registration)

                                        [address] [place] [phone] C Reactive Protein Quantitativ 3.08 mg/dL High       0

.00-0.30   







                          1                         Units are mL/min/1.73 m2



Chronic Kidney Disease Staging per [facility]:



Stage I & II   GFR >=60       Normal to Mildly Decreased

Stage III      GFR 30-59      Moderately Decreased

Stage IV       GFR 15-29      Severely Decreased

Stage V        GFR <15        Very Little GFR Left

ESRD           GFR <15 on RRT



 

                          2                         Negative results do not prec

lude influenza or RSV virus

infection and should not be used as the sole basis for

treatment or other patient management decisions.



 

                          3                         Negative results do not prec

lude influenza or RSV virus

infection and should not be used as the sole basis for

treatment or other patient management decisions.



 

                          4                         Negative results do not prec

lude influenza or RSV virus

infection and should not be used as the sole basis for

treatment or other patient management decisions.



 

                          5                         A false negative result may 

occur if a specimen is

improperly collected, transported or handled. False negative

results may also occur if inadequate numbers of organisms

are present in the specimen.  As with any molecular test,

mutations within the target regions of [employer]

SARS-CoV-2 could affect primer and/or probe binding

resulting in failure to detect the presence of virus.  This

test cannot rule out diseases caused by other bacterial or

viral pathogens.



DISCLAIMER:

Testing was performed using the [facility] SARS-CoV-2 test.

This test was developed and its performance characteristics

determined by [facility]. This test has not been FDA cleared or

approved. This test has been authorized by FDA under an

Emergency Use Authorization (EUA). This test is only

authorized for the duration of time the declaration that

circumstances exist justifying the authorization of the

emergency use of in vitro diagnostic tests for detection of

SARS-CoV-2 virus and/or diagnosis of COVID-19 infection

under section 564(b)(1) of the Act, 21 U.S.C.

                                        360bbb-3(b)(1), unless the authorization

 is terminated or

revoked sooner.



 

                          6                         Units are mL/min/1.73 m2



Chronic Kidney Disease Staging per [facility]:



Stage I & II   GFR >=60       Normal to Mildly Decreased

Stage III      GFR 30-59      Moderately Decreased

Stage IV       GFR 15-29      Severely Decreased

Stage V        GFR <15        Very Little GFR Left

ESRD           GFR <15 on RRT









Procedures





                Date            Code            Description     Status

 

                10/26/2021      11025           Office/Outpatient Established Lo

w MDM 20-29 Min Completed

 

                10/15/2021      52523           Office/Outpatient Established Lo

w MDM 20-29 Min Completed

 

                2021      33941           Office/Outpatient Established Mo

d MDM 30-39 Min Completed

 

                2021      21353           Office/Outpatient Established Lo

w MDM 20-29 Min Completed

 

                2021      43065           Office/Outpatient Established Lo

w MDM 20-29 Min Completed

 

                2020      [id number]        Mammogram       Completed

 

                2019      [id number]        Mammogram       Completed







Medical Devices





                                        Description

 

                                        No Information Available







Encounters





           Type       Date       Location   Provider   Dx         Diagnosis

 

                Office Visit    10/26/2021  2:40p [facility] ELMER Kan               R09.89                    Oth symptoms and signs invol

ving the circ and resp systems

 

                          D72.89                    Other specified disorders of

 white blood cells

 

                Office Visit    10/15/2021  2:00p [facility] ELMER Kan               R42                       Dizziness and giddiness

 

                          R35.0                     Frequency of micturition

 

                Office Visit    2021 10:00a [facility] ELMER Kan               F33.0                     Major depressive disorder, r

ecurrent, mild

 

                          E78.2                     Mixed hyperlipidemia

 

                          K21.9                     Gastro-esophageal reflux dis

ease without esophagitis

 

                          F03.90                    Unspecified dementia without

 behavioral disturbance

 

                          Z87.11                    Personal history of peptic u

lcer disease

 

                          Z79.899                   Other long term (current) dr roberts therapy

 

                Office Visit    2021  8:30a [facility] ELMER Kan               Z48.02                    Encounter for removal of sut

ures

 

                          W10.8xxA                  Fall (on) (from) other stair

s and steps, initial encounter

 

                          S00.83xA                  Contusion of other part of h

ead, initial encounter

 

                          Z23                       Encounter for immunization

 

                Office Visit    2021  1:30p [facility] ELMER Kan               S01.81xA                  Laceration w/o foreign body 

of oth part of head, init 

encntr

 

                          W10.8xxA                  Fall (on) (from) other stair

s and steps, initial encounter

 

                          S00.83xA                  Contusion of other part of h

ead, initial encounter







Assessments





                Date            Code            Description     Provider

 

                    10/26/2021          R09.89              Other specified symp

toms and signs involving the circulatory 

and respiratory systems                 ELMER Kan

 

                10/26/2021      D72.89          Other specified disorders of [facility]

te blood cells ELMER Kan

 

                10/15/2021      R42             Dizziness and giddiness ELMER Kan

 

                10/15/2021      R35.0           Frequency of micturition ELMER Kan

 

                2021      F33.0           Major depressive disorder, recur

rent, mild ELMER Kan

 

                2021      E78.2           Mixed hyperlipidemia ELMER Ramos

 

                2021      K21.9           Gastro-esophageal reflux disease

 without esophagitis ELMER Kan

 

                2021      F03.90          Unspecified dementia without beh

avioral disturbance ELMER Kan

 

                2021      Z87.11          Personal history of peptic ulcer

 disease ELMER Kan

 

                2021      Z79.899         Other long term (current) drug t

herapy ELMER Kan

 

                2021      Z48.02          Encounter for removal of sutures

 ELMER Kan

 

                2021      W10.8xxA        Fall (on) (from) other stairs an

d steps, initial encounter 

ELMER Kan

 

                2021      S00.83xA        Contusion of other part of head,

 initial encounter ELMER Kan

 

                2021      Z23             Encounter for immunization ELMER Soliman

 

                    2021          S01.81xA            Laceration without f

oreign body of other part of head, 

initial encounter                       ELMER Kan

 

                2021      W10.8xxA        Fall (on) (from) other stairs an

d steps, initial encounter 

ELMER Kan

 

                2021      S00.83xA        Contusion of other part of head,

 initial encounter ELMER Kan







Plan of Treatment

Future Appointment(s):* 2022 10:00 am - ELMER Kan at [facility]





Functional Status





                                        Description

 

                                        No Information Available







Mental Status





                                        Description

 

                                        No Information Available







Referrals





                                        Description

 

                                        No Information Available

## 2021-11-27 NOTE — ECGEPIP
[facility] - ED

                                       

                                       Test Date:    2021

Pat Name:     AMY OLIVERA             Department:   

Patient ID:   [id number]                 Room:         -

Gender:       Female                   Technician:   LR

:          [id number]               Requested By: Elliot CORTEZ

Order Number: [id number]     Reading MD:   Anabel Allen

                                 Measurements

Intervals                              Axis          

Rate:         80                       P:            

WY:                                    QRS:          49

QRSD:         98                       T:            -47

QT:           398                                    

QTc:          459                                    

                           Interpretive Statements

sinus rhythm with occasional premature ventricular complexes

Low voltage QRS

Septal infarct , age undetermined

NSTTW abnormalities

baseline artifact may affect interpretation

Electronically Signed on 2021 10:33:16 EST by Anabel Allen

## 2021-11-27 NOTE — HPEPDOC
[facility] Medical History & Physical


Date of Admission


2021


Date of Service:  2021





History and Physical


CHIEF COMPLAINT:


"I dropped my cane and fell"





HISTORY OF PRESENT ILLNESS: 


[age]-year-old female with a past medical history of dementia, GERD, 

hyperlipidemia, and depression presented emergency room department after a fall.

 She reports getting up from bed with a cane but dropped to the floor and 

subsequently she fell.  In the emergency room department.  She denied prodromal 

symptoms including dizziness, palpitations, nausea, vomiting, diaphoresis; as 

well as shortness of breath and chest pain at the time.  The emergency room 

department and x-ray noted a intertrochanteric fracture of the proximal right 

femur.  At this junction, she complains of pain in her right lower leg.  She 

denies chest pain, shortness of breath, abdominal pain, nausea, vomiting, 

problems with urination or bowel movements.





PAST MEDICAL HISTORY:


As mentioned above and,


Colon cancer status post sigmoid colectomy


Hepatic and renal cysts


Multijoint osteoarthritis


Lumbar degenerative disc disease


Right adrenal hematoma


Peptic ulcer disease


  


PAST SURGICAL HISTORY:


IRVIN and BSO


Laparotomy with colostomy reversal





SOCIAL HISTORY:


 and lives with her .  She requires assistance with IADLs/ADLs by 

her .  Denies smoking, drinking, use of recreational drugs.





FAMILY HISTORY:


Father: .  History of diabetes


Mother: Disease.  History of stomach cancer


Siblings: Sister  at the age of 64, she had diabetes and heart disease.





ALLERGIES: Please see below.





REVIEW OF SYSTEMS:


10 point review of system was negative except for what is noted in the HPI





HOME MEDICATIONS: Please see below. 





PHYSICAL EXAMINATION:


VITAL SIGNS: Please see below


General: Lying in bed, no acute distress


Head/Neck/Throat: Trachea midline, mucous membranes moist


Eyes: Sclera anicteric, PERRLA


Thorax: Normal respiratory effort on room air, lungs clear to auscultation 

bilaterally, no wheezes/rales/rhonchi


Cardiovascular: Normal rate, regular rhythm, normal S1, S2; no S3, S4, 

rubs/gallops/murmurs


Abdomen: Bowel sounds present, soft/nontender/nondistended


Genitourinary: No CVA tenderness, no Leal in place


Musculoskeletal: Laying on left hip, with right hip flexed, reported pain with 

movement.


Skin: Warm, dry


Neurologic: Awake, alert, oriented however forgetful conversation.  She is able 

to follow commands.





LABORATORY DATA: See below.





IMAGING: 


Chest, 1 view 


FINDINGS:


The mediastinum and cardiac silhouette are stable and within normal limits for


portable technique.  The lung fields are clear without acute consolidation, 

effusion,


or pneumothorax.  Skeletal structures are intact.





IMPRESSION:


No acute cardiopulmonary process appreciated.  





Femur  RIGHT 


FINDINGS:


Visualized osseous structures are intact and without evidence for acute fracture

or


dislocation.  Age-related changes at the knee noted.  No subcutaneous emphysema 

or


foreign body.





IMPRESSION:


No acute fracture or dislocation.





Hip,AP,LAT to include Pelvis  RIGHT 


FINDINGS:


Comminuted displaced intertrochanteric fracture of the right femur.





Remainder of the examination demonstrates age-related changes.  No further acute


fracture or dislocation appreciated.





IMPRESSION:


Acute comminuted displaced angulated intertrochanteric fracture proximal right 

femur.





MICROBIOLOGY: Please see below. 





ASSESSMENT/PLAN:





86-year-old female present emergency room department after a mechanical fall.  

She was noted to have a intratrochanteric fracture of the right proximal femur





#Right proximal femur fracture 


-S/p mechanical fall.  She is medically cleared to go to the OR.  Symptomatic 

management with pain control medications.


-PT/OT evaluation following surgery and pending Ortho activity tolerance 

recommendations





#GERD


-Continue omeprazole





#Hyperlipidemia


-Continue statin





#Dementia


-Continue memantine





#DVT prophylaxis


-She will be on SCDs for now.  Will discuss with orthopedic team for appropriate

prophylaxis to be discharged on.





Vital Signs





Vital Signs








  Date Time  Temp Pulse Resp B/P (MAP) Pulse Ox O2 Delivery O2 Flow Rate FiO2


 


21 09:32  79 16  100 Room Air  


 


21 09:31    189/87 (121)    


 


21 08:28 97.1       











Laboratory Data


Labs 24H


Laboratory Tests 2


21 08:42: POC Troponin I (Misc) 0.01


21 08:43: 


Nucleated Red Blood Cells % (auto) 0.0, Prothrombin Time 13.9, Prothromb Time 

International Ratio 1.03, Activated Partial Thromboplast Time 28.8, Coronavirus 

(COVID-19)(PCR) NEGATIVE, Influenza Type A (RT-PCR) NEGATIVE, Influenza Type B 

(RT-PCR) NEGATIVE, Respiratory Syncytial Virus (PCR) NEGATIVE


CBC/BMP


Laboratory Tests


21 08:43











Home Medications


Scheduled


Atorvastatin Calcium (Atorvastatin Calcium) 10 Mg Tab, 10 MG PO QHS


Cholecalciferol (Vitamin D3) (Vitamin D3) 50 Mcg Tablet, 50 MCG PO DAILY


Citalopram Hydrobromide (Citalopram HBr) 10 Mg Tablet, 10 MG PO DAILY


Cyanocobalamin (Vitamin B-12) (Vitamin B-12) 1,000 Mcg Tab, 1,000 MCG PO QHS


Memantine HCl (Namenda) 10 Mg Tablet, 10 MG PO BID


Omeprazole (Omeprazole) 40 Mg Capsule.dr, 40 MG PO DAILY


[prevagen]  , 1 TAB PO DAILY





Allergies


Coded Allergies:  


     iodine (Verified  Allergy, Intermediate, hives, 20)


   contast media


     amoxicillin (Verified  Allergy, Unknown, 10/1/19)


     NSAIDS (Non-Steroidal Anti-Inflamma (Verified  Adverse Reaction, 

Intermediate, bleeding ulcers, 10/1/19)


     clavulanic acid (Verified  Adverse Reaction, Intermediate, rash/ C-diff, 

20)


     naproxen (Verified  Adverse Reaction, Intermediate, GI bleeds, 10/1/19)


     cephalexin (Verified  Adverse Reaction, Unknown, yeast infection, 21)





A-FIB/CHADSVASC


A-FIB History


Current/History of A-Fib/PAF?:  No











LEANDER LUCIANO M.D.           2021 10:14

## 2021-11-27 NOTE — CCD
Continuity of Care Document (CCD)

                             Created on: 10/22/2021



[place], Mona Lilian

External Reference #: [id number]

: 1935

Sex: Female



Demographics





                          Address                   [id number] [place] [address]  [address]

 

                          Home Phone                [phone]

 

                          Preferred Language        Unknown

 

                          Marital Status            

 

                          Druze Affiliation     Unknown

 

                          Race                      White

 

                          Ethnic Group              Declined to Specify/Unknown





Author





                          Author                    Elvia FLOERS D.O.

 

                          Organization              Unknown

 

                          Address                   [address]

[place]  [address]



 

                          Phone                     [phone]







Care Team Providers





                    Care Team Member Name Role                Phone

 

                    Brooklyn Flores D.O. AUTM                [phone]

 

                    KALYAN Bartholomew M.D. AUTM                [phone]

 

                    [facility] AUTM                [phone]

 

                    [facility] AUTM     

           [phone]

 

                    Omer Gutierrez M.D. AUTM                [phone]







Problems





                    Active Problems     Provider            Date

 

                    H/O: peptic ulcer   ELMER Kan Onset: 2019

 

                    Mixed hyperlipidemia ELMER Kan Onset: 2019

 

                    Mild recurrent major depression ELMER Kan Onset: 0

2019

 

                    Dementia            ELMER Kan Onset: 10/10/2019

 

                    Gastroesophageal reflux disease ELMER Kan Onset: 1

0/10/2019







Social History





                Type            Date            Description     Comments

 

                Birth Sex                       Unknown          

 

                ETOH Use                        Denies alcohol use  

 

                Tobacco Use     Start: Unknown  Patient has never smoked  

 

                Recreational Drug Use                 Denies Drug Use  

 

                Smoking Status  Reviewed: 21 Patient has never smoked  

 

                Exercise Type/Frequency                 Does not exercise  

 

                Sun Exposure                    Does not use sunscreen  

 

                Seat Belt/Car Seat                 Always uses seat belt  







Allergies and adverse reactions





             Active Allergies Criticality  Reaction | Severity Comments     Date

 

             Iodine       Unable to assess criticality                          

 2018







Medications





           Active Medications SIG        Qnty       Indications Ordering Provide

r Date

 

                          Bactrim DS                     800-160mg Tablets      

             take one 

tablet by mouth every 12 hours for ten days 20tabs              R35.0           

    PETRONA Palacios.O.                                    10/15/2021

 

                                        Calcium 600+D High Potency              

       600-400mg-Unit Tablets           

             1 by mouth twice a day 180tabs                   Brooklyn sherman, D.O. 2021

 

                          Prevagen Extra Strength                     20mg Capsu

les                   take

one capsule by mouth daily. 90caps                          PETRONA Palacios.O. 2021

 

                          Memantine HCL                     10mg Tablets        

           Take One Tablet

By Mouth Twice A Day 180tabs         F03.90          PETRONA Palacios.O. 

10/07/2020

 

                                        Fluticasone Propionate Nasal Spray      

               50mcg/Act Suspension     

                two sprays each nostril once daily 29.7ml          J06.9        

   PETRONA Yee.O.                            2020

 

                          Omeprazole                     40mg Capsules DR       

            take one 

capsule by mouth every day 90caps                          PETRONA Palacios.O. 2019

 

                          Diclofenac Sodium                     1% Gel          

         apply 3 grams to 

painful area of left elbow twice a day as needed 100units            M79.642    

         PETRONA Yee.O.                            04/10/2019

 

                          Citalopram Hydrobromide                     10mg Table

ts                   Take 

One Tablet By Mouth Every Day 90tabs                          Brooklyn sherman, D.O. 

 

                          Vitamin D3                     2000Unit Capsules      

             1 by mouth 

every day                                       Unknown         

 

                          Vitamin B-12                     1000mcg Tablets      

             1 by mouth 

every day                                       Unknown         

 

                          Atorvastatin Calcium                     10mg Tablets 

                  Take One

Tablet By Mouth Every Evening 90tabs                          Brooklyn sherman, D.O. 

 

                          Tylenol                     325mg Tablets             

      take 1-2 tabs daily 

as needed                                       Unknown         

 

                                        History Medications

 

                                        Tetanus/Diphtheria Toxoids-Adsorbed Adul

t                     2-2LF/0.5ML 

Suspension                   administer tetanus with pertussis at pharmacy. 5ml 

                

                          PETRONA Palacios.O. 2021 - 2021







Immunizations





             CPT Code     Status       Date         Vaccine      Lot #

 

                [id number]           Given           2021      Tetanus, Diphthe

aixa Toxoids/Acellular Pertussis Vaccine 7

Or >                                    [id number]

 

             [id number]        Given        2020   Pneumococcal Vaccine 23 [address]

nt 2Yrs Or Older  

 

                [id number]           Given           2019      Pneumococcal Con

jugate Vaccine 13 Valent For 

Intramuscular Use                        

 

             U-Flu        Given        2019   Influenza,Unspecified  







Vital Signs





                Date            Vital           Result          Comment

 

                10/15/2021  2:11pm BP Systolic     92 mmHg          

 

                    BP Diastolic        58 mmHg              

 

                    Height              61.7 inches         5'1.70"

 

                    Heart Rate          80 /min              

 

                    Respiratory Rate    12 /min              

 

                    Body Temperature    98.5 F             

 

                    O2 % BldC Oximetry  95 %                 

 

                    Ideal Body Weight   105 lb               

 

                2021 10:03am BP Systolic     124 mmHg         

 

                    BP Diastolic        74 mmHg              

 

                    Height              61.7 inches         5'1.70"

 

                    Weight              144.25 lb            

 

                    BMI (Body Mass Index) 26.6 kg/m2           

 

                    Heart Rate          74 /min              

 

                    Respiratory Rate    18 /min              

 

                    Body Temperature    97.8 F             

 

                    O2 % BldC Oximetry  95 %                 

 

                    Ideal Body Weight   105 lb               







Results





        Test    Acquired Date Facility Test    Result  H/L     Range   Note

 

                    Influenza A/B RSV Covid Amp 10/21/2021          [facility]

[facility] Testing (Registration)

                                        [address] [place] [address] [phone] Influenza A Amplification NEGATIVE   Normal     Negati

ve   1

 

             Influenza B Amplification NEGATIVE     Normal       Negative     2

 

             RSV Amplification NEGATIVE     Normal       Negative     3

 

             Sars Covid-19 Amplification NEGATIVE     Normal       Negative     

4

 

                    CBC With Differential 10/21/2021          [facility]

ting (Registration)

                                        [address] [place] [address] [phone] White Blood Count 4.9 10     Normal     4.0-10.0    

 

             Red Blood Count 4.13 10      Normal       4.00-5.40     

 

             Hemoglobin   12.8 g/dL    Normal       12.0-15.5     

 

             Hematocrit   38.3 %       Normal       36.0-47.0     

 

             Mean Corpuscular Volume 92.7 fl      Normal       80.0-96.0     

 

             Mean Corpuscular Hemoglobin 31.0 pg      Normal       27.0-33.0    

 

 

             Mean Corpuscular HGB Conc 33.4 g/dL    Normal       32.0-36.5     

 

             Red Cell Distribution Width 13.0 %       Normal       11.5-14.5    

 

 

             Platelet Count, Automated 162 10       Normal       150-450       

 

             Nucleated Red Blood Cell % 0.0 %        Normal       0-0           

 

                    Differential        10/21/2021          [facility]

ng (Registration)

                                        [address] [place] [address] [phone] Neutrophils 57 %       Normal     28-66       

 

             Lymphocytes  25 %         Normal       16-44         

 

             Monocytes    3 %          Normal       0-5           

 

             Eosinophils  4 %          High         0-3           

 

             Atypical Lymph 11 %         High         0-5           

 

             Hypochromasia 1+           Normal                     

 

                    Laboratory test finding 10/21/2021          [facility]

esting (Registration)

                                        [address] [place] [address] [phone] Platelet Estimate DECREASED  Normal     Normal      

 

                    Ua Routine          10/21/2021          [facility]

ng (Registration)

                                        [address] [place] [address] [phone] Appearance, Urine HAZY       Normal     Clear       

 

             Color, Urine YELLOW       Normal       Yellow        

 

             PH,Urine     5.0 units    Normal       5.0-9.0       

 

             Specific Gravity Urine Auto 1.013        Normal       1.002-1.035  

 

 

             Protein, Urine Auto NEGATIVE mg/dL Normal       Negative      

 

             Glucose, Urine (Ua) Auto NEGATIVE mg/dL Normal       Negative      

 

             Ketone, Urine Auto TRACE mg/dL  High         Negative      

 

             Urobilinogen, Urine Auto 0.2 mg/dL    Normal       0.0-2.0       

 

             Bilirubin, Urine Auto NEGATIVE     Normal       Negative      

 

             Nitrite, Urine Auto NEGATIVE     Normal       Negative      

 

             Leukocyte Esterase, Urine Auto NEGATIVE     Normal       Negative  

    

 

             Blood, Urine Blood 1+           High         Negative      

 

             WBC, Urine Auto 0 /HPF       Normal       0-3           

 

             RBC, Urine Auto 0 /HPF       Normal       0-3           

 

             Bacteria, Urine Auto NEGATIVE     Normal       Negative      

 

             Squamous Epithelial Cell Ur AU 8 /HPF       Normal       0-6       

    

 

             Hyaline Cast, Urine Auto 0 /LPF       Normal       0-1           

 

                    Comprehensive Metabolic Profil 10/21/2021          [facility]

[facility] Testing (Registration)

                                        [address] [place] [address] [phone] Glucose, Fasting 103 mg/dL  High             

 

             Blood Urea Nitrogen 16 mg/dL     Normal       7-18          

 

             Creatinine For GFR 0.92 mg/dL   Normal       0.55-1.30     

 

             Glomerular Filtration Rate > 60.0       Normal       >32          5

 

             Sodium Level 139 mEq/L    Normal       136-145       

 

             Potassium Serum 4.1 mEq/L    Normal       3.5-5.1       

 

             Chloride Level 106 mEq/L    Normal               

 

             Carbon Dioxide Level 27 mEq/L     Normal       21-32         

 

             Anion Gap    6 mEq/L      Low          8-16          

 

             Calcium Level 8.7 mg/dL    Low          8.8-10.2      

 

             Ast/Sgot     21 U/L       Normal       7-37          

 

             Alt/SGPT     24 U/L       Normal       12-78         

 

             Alkaline Phosphatase 96 U/L       Normal               

 

             Bilirubin,Total 0.4 mg/dL    Normal       0.2-1.0       

 

             Total Protein 6.3 GM/DL    Low          6.4-8.2       

 

             Albumin      3.3 GM/DL    Normal       3.2-5.2       

 

             Albumin/Globulin Ratio 1.1          Low          1.2-2.2       

 

                    CBC With Differential 10/19/2021          [facility]

ting (Registration)

                                        [address] [place] [address] [phone] White Blood Count 4.5 10     Normal     4.0-10.0    

 

             Red Blood Count 4.05 10      Normal       4.00-5.40     

 

             Hemoglobin   12.7 g/dL    Normal       12.0-15.5     

 

             Hematocrit   38.1 %       Normal       36.0-47.0     

 

             Mean Corpuscular Volume 94.1 fl      Normal       80.0-96.0     

 

             Mean Corpuscular Hemoglobin 31.4 pg      Normal       27.0-33.0    

 

 

             Mean Corpuscular HGB Conc 33.3 g/dL    Normal       32.0-36.5     

 

             Red Cell Distribution Width 13.0 %       Normal       11.5-14.5    

 

 

             Platelet Count, Automated 147 10       Low          150-450       

 

             Neutrophils % 60.3 %       Normal       36.0-66.0     

 

             Lymph %      24.1 %       Normal       24.0-44.0     

 

             Mono %       9.5 %        High         2.0-8.0       

 

             Eos %        5.3 %        High         0.0-3.0       

 

             Baso %       0.4 %        Normal       0.0-1.0       

 

             Immature Granulocyte % 0.4 %        Normal       0-3.0         

 

             Nucleated Red Blood Cell % 0.0 %        Normal       0-0           

 

             Neutrophils # 2.7 10       Normal       1.5-8.5       

 

             Lymph #      1.1 10       Low          1.5-5.0       

 

             Mono #       0.4 10       Normal       0.0-0.8       

 

             Eos #        0.2 10       Normal       0.0-0.5       

 

             Baso #       0.0 10       Normal       0.0-0.2       

 

                    Laboratory test finding 10/19/2021          [facility]

esting (Registration)

                                        [address] [place] [address] [phone] C Reactive Protein Quantitativ 3.08 mg/dL High       0

.00-0.30   

 

                    Comprehensive Metabolic Profil 10/19/2021          [facility]

[facility] Testing (Registration)

                                        [address] [place] [address] [phone] Glucose, Fasting 79 mg/dL   Normal           

 

             Blood Urea Nitrogen 18 mg/dL     Normal       7-18          

 

             Creatinine For GFR 0.90 mg/dL   Normal       0.55-1.30     

 

             Glomerular Filtration Rate > 60.0       Normal       >32          6

 

             Sodium Level 139 mEq/L    Normal       136-145       

 

             Potassium Serum 4.3 mEq/L    Normal       3.5-5.1       

 

             Chloride Level 108 mEq/L    High                 

 

             Carbon Dioxide Level 27 mEq/L     Normal       21-32         

 

             Anion Gap    4 mEq/L      Low          8-16          

 

             Calcium Level 9.0 mg/dL    Normal       8.8-10.2      

 

             Ast/Sgot     18 U/L       Normal       7-37          

 

             Alt/SGPT     22 U/L       Normal       12-78         

 

             Alkaline Phosphatase 85 U/L       Normal               

 

             Bilirubin,Total 0.3 mg/dL    Normal       0.2-1.0       

 

             Total Protein 6.0 GM/DL    Low          6.4-8.2       

 

             Albumin      3.3 GM/DL    Normal       3.2-5.2       

 

             Albumin/Globulin Ratio 1.2          Normal       1.2-2.2       

 

                    Ua Routine          10/19/2021          [facility]

 (Registration)

                                        [address] [place] [address]

           [phone] Appearance, Urine HAZY       Normal     Clear       

 

             Color, Urine YELLOW       Normal       Yellow        

 

             PH,Urine     5.0 units    Normal       5.0-9.0       

 

             Specific Gravity Urine Auto 1.018        Normal       1.002-1.035  

 

 

             Protein, Urine Auto NEGATIVE mg/dL Normal       Negative      

 

             Glucose, Urine (Ua) Auto NEGATIVE mg/dL Normal       Negative      

 

             Ketone, Urine Auto NEGATIVE mg/dL Normal       Negative      

 

             Urobilinogen, Urine Auto 2.0 mg/dL    High         0.0-2.0       

 

             Bilirubin, Urine Auto NEGATIVE     Normal       Negative      

 

             Nitrite, Urine Auto NEGATIVE     Normal       Negative      

 

             Leukocyte Esterase, Urine Auto NEGATIVE     Normal       Negative  

    

 

             Blood, Urine Blood 1+           High         Negative      

 

             WBC, Urine Auto 2 /HPF       Normal       0-3           

 

             RBC, Urine Auto 7 /HPF       High         0-3           

 

             Bacteria, Urine Auto NEGATIVE     Normal       Negative      

 

             Squamous Epithelial Cell Ur AU 3 /HPF       Normal       0-6       

    

 

             Mucus, Urine SMALL        Normal       Negative      

 

             Hyaline Cast, Urine Auto 0 /LPF       Normal       0-1           

 

                    Ua Routine          10/18/2021          [facility]

[facility]

             Ua Specific Gravity <pending>                               

 

             Ua PH Test Strip <pending>                               

 

             Ua Color     <pending>                               

 

             Ua Appearance <pending>                               

 

             Ua WBC       <pending>                               

 

             Ua Protein   <pending>                               

 

             Urine Glucose QL <pending>                               

 

             Ua Ketones   <pending>                               

 

             Ua Bilirubin <pending>                               

 

             Urine Urobilinogen QN TS <pending>                               

 

             Urine Nitrite QL TS <pending>                               

 

             Ua Occult Blood <pending>                               

 

                    Laboratory test finding 10/18/2021          [facility]

             Culture Urine Routine <pending>                               

 

                    CMP                 10/18/2021          [facility]

             Albumin Serum/Plasma <pending>                               

 

             Alt - SGPT   <pending>                               

 

             Calcium Ser/Plasma Mass/Vol <pending>                              

 

 

             Carbon Dioxide Ser/Plasm <pending>                               

 

             Chloride Serum/Plasma <pending>                               

 

             Creatinine Serum Mass/Vol <pending>                               

 

             Glucose Serum <pending>                               

 

             Alkaline Phosphatase <pending>                               

 

             Potassium    <pending>                               

 

             Protein Total <pending>                               

 

             Sodium       <pending>                               

 

             Ast - Sgot   <pending>                               

 

             BUN - Urea Nitrogen <pending>                               

 

                    CBC W/Auto Differential 10/18/2021          [facility]

             White Blood Count Ser Auto CNT <pending>                           

    

 

             RBC Red Blood Count <pending>                               

 

             Hemoglobin Blood <pending>                               

 

             Hematocrit   <pending>                               

 

             MCV (Corpuscular Volume) <pending>                               

 

             MCH (Corpuscular Hemoglobin) <pending>                             

  

 

             MCHC (Corpuscular Hemog Conc) <pending>                            

   

 

             RDW          <pending>                               

 

             Platelet Count Blood Auto CNT <pending>                            

   

 

             MPV          <pending>                               

 

             Neutrophils  <pending>                               

 

             Fluid Bands  <pending>                               

 

             Fluid Lymphocytes <pending>                               

 

             Monocytes    <pending>                               

 

             Fluid Body Eosinophils <pending>                               

 

             Basophils %  <pending>                               

 

             Absolute Basophils <pending>                               

 

             Absolute Eosinophils <pending>                               

 

             Absolute Lymphocytes <pending>                               

 

             Absolute Monocytes <pending>                               

 

             Absolute Neutrophils Auto CNT <pending>                            

   

 

                    Laboratory test finding 10/18/2021          [facility]

             CRP (High Sensitivity) <pending>                               







                          1                         Negative results do not prec

lude influenza or RSV virus

infection and should not be used as the sole basis for

treatment or other patient management decisions.



 

                          2                         Negative results do not prec

lude influenza or RSV virus

infection and should not be used as the sole basis for

treatment or other patient management decisions.



 

                          3                         Negative results do not prec

lude influenza or RSV virus

infection and should not be used as the sole basis for

treatment or other patient management decisions.



 

                          4                         A false negative result may 

occur if a specimen is

improperly collected, transported or handled. False negative

results may also occur if inadequate numbers of organisms

are present in the specimen.  As with any molecular test,

mutations within the target regions of Xpert Xpress

SARS-CoV-2 could affect primer and/or probe binding

resulting in failure to detect the presence of virus.  This

test cannot rule out diseases caused by other bacterial or

viral pathogens.



DISCLAIMER:

Testing was performed using the [employer] SARS-CoV-2 test.

This test was developed and its performance characteristics

determined by [employer]. This test has not been FDA cleared or

approved. This test has been authorized by FDA under an

Emergency Use Authorization (EUA). This test is only

authorized for the duration of time the declaration that

circumstances exist justifying the authorization of the

emergency use of in vitro diagnostic tests for detection of

SARS-CoV-2 virus and/or diagnosis of COVID-19 infection

under section 564(b)(1) of the Act, 21 U.S.C.

                                        360bbb-3(b)(1), unless the authorization

 is terminated or

revoked sooner.



 

                          5                         Units are mL/min/1.73 m2



Chronic Kidney Disease Staging per [facility]:



Stage I & II   GFR >=60       Normal to Mildly Decreased

Stage III      GFR 30-59      Moderately Decreased

Stage IV       GFR 15-29      Severely Decreased

Stage V        GFR <15        Very Little GFR Left

ESRD           GFR <15 on RRT



 

                          6                         Units are mL/min/1.73 m2



Chronic Kidney Disease Staging per [facility]:



Stage I & II   GFR >=60       Normal to Mildly Decreased

Stage III      GFR 30-59      Moderately Decreased

Stage IV       GFR 15-29      Severely Decreased

Stage V        GFR <15        Very Little GFR Left

ESRD           GFR <15 on RRT









Procedures





                Date            Code            Description     Status

 

                10/15/2021      56745           Office/Outpatient Established Lo

w MDM 20-29 Min Completed

 

                2021      76240           Office/Outpatient Established Mo

d MDM 30-39 Min Completed

 

                2021      59638           Office/Outpatient Established Lo

w MDM 20-29 Min Completed

 

                2021      92676           Office/Outpatient Established Lo

w MDM 20-29 Min Completed

 

                2020      [id number]        Mammogram       Completed

 

                2019      [id number]        Mammogram       Completed







[employer]





                                        Description

 

                                        No Information Available







Encounters





           Type       Date       Location   Provider   Dx         Diagnosis

 

                Office Visit    10/15/2021  2:00p [facility] EL[place] Donald               R42                       Dizziness and giddiness

 

                          R35.0                     Frequency of micturition

 

                Office Visit    2021 10:00a [facility] [place]               F33.0                     Major depressive disorder, r

ecurrent, mild

 

                          E78.2                     Mixed hyperlipidemia

 

                          K21.9                     Gastro-esophageal reflux dis

ease without esophagitis

 

                          F03.90                    Unspecified dementia without

 behavioral disturbance

 

                          Z87.11                    Personal history of peptic u

lcer disease

 

                          Z79.899                   Other long term (current) dr

armando therapy

 

                Office Visit    2021  8:30a [facility] [place]               Z48.02                    Encounter for removal of sut

ures

 

                          W10.8xxA                  Fall (on) (from) other stair

s and steps, initial encounter

 

                          S00.83xA                  Contusion of other part of h

ead, initial encounter

 

                          Z23                       Encounter for immunization

 

                Office Visit    2021  1:30p [facility] [place] Kan               S01.81xA                  Laceration w/o foreign body 

of oth part of head, init 

encntr

 

                          W10.8xxA                  Fall (on) (from) other stair

s and steps, initial encounter

 

                          S00.83xA                  Contusion of other part of h

ead, initial encounter







Assessments





                Date            Code            Description     Provider

 

                10/15/2021      R42             Dizziness and giddiness [facility] Kan

 

                10/15/2021      R35.0           Frequency of micturition [facility] Kan

 

                2021      F33.0           Major depressive disorder, recur

rent, mild [facility] Kan

 

                2021      E78.2           Mixed hyperlipidemia EL[facility] Ramos

 

                2021      K21.9           Gastro-esophageal reflux disease

 without esophagitis [facility] Kan

 

                2021      F03.90          Unspecified dementia without beh

avioral disturbance [facility] Kan

 

                2021      Z87.11          Personal history of peptic ulcer

 disease [facility] Kan

 

                2021      Z79.899         Other long term (current) drug t

herapy [facility] Kan

 

                2021      Z48.02          Encounter for removal of sutures

 [facility] Kan

 

                2021      W10.8xxA        Fall (on) (from) other stairs an

d steps, initial encounter 

[facility] Kan

 

                2021      S00.83xA        Contusion of other part of head,

 initial encounter EL[facility] Kan

 

                2021      Z23             Encounter for immunization Ramiro

EL[facility] Morel

 

                    2021          S01.81xA            Laceration without f

oreign body of other part of head, 

initial encounter                       [facility] Kan

 

                2021      W10.8xxA        Fall (on) (from) other stairs an

d steps, initial encounter 

[facility] Kan

 

                2021      S00.83xA        Contusion of other part of head,

 initial encounter [facility] Kan







Plan of Treatment

Future Appointment(s):* 2022 10:00 am - [place] Kan at [facility]

10/15/2021 - [place] Kan* R42 Dizziness and giddiness* Comments:* Please
  make sure your drink more fluids and increase your salt intake.





* R35.0 Frequency of micturition* New Medication:* Bactrim -160 mg - take 
  one tablet by mouth every 12 hours for ten days



* Comments:* increased urinary frequency, confusion, headache and will treat 
  presumptively for a UTI









Functional Status





                                        Description

 

                                        No Information Available







Mental Status





                                        Description

 

                                        No Information Available







Referrals





                                        Description

 

                                        No Information Available

## 2021-11-27 NOTE — CCD
Continuity of Care Document (CCD)

                             Created on: 10/26/2021



Helder, Elviabar Quintana

External Reference #: [id number]

: 1935

Sex: Female



Demographics





                          Address                   [address] [place]  [address]

 

                          Home Phone                [phone]

 

                          Preferred Language        Unknown

 

                          Marital Status            

 

                          Judaism Affiliation     Unknown

 

                          Race                      White

 

                          Ethnic Group              Declined to Specify/Unknown





Author





                          Author                    Elvia TOLENTINO

 

                          Organization              Unknown

 

                          Address                    [place]  [address]



 

                          Phone                     [phone]







Care Team Providers





                    Care Team Member Name Role                Phone

 

                    Brooklyn Flores D.O. AUTM                [phone]

 

                    KALYAN Bartholomew M.D. AUTM                [phone]

 

                    [facility] AUTM                [phone]

 

                    [facility] AUTM     

           [phone]

 

                    Omer Gutierrez M.D. AUTM                [phone]







Problems





                    Active Problems     Provider            Date

 

                    H/O: peptic ulcer   ELMER Kan Onset: 2019

 

                    Mixed hyperlipidemia ELMER Kan Onset: 2019

 

                    Mild recurrent major depression ELMER Kan Onset: 0

2019

 

                    Dementia            ELMER Kan Onset: 10/10/2019

 

                    Gastroesophageal reflux disease ELMER Kan Onset: 1

0/10/2019







Social History





                Type            Date            Description     Comments

 

                Birth Sex                       Unknown          

 

                ETOH Use                        Denies alcohol use  

 

                Tobacco Use     Start: Unknown  Patient has never smoked  

 

                Recreational Drug Use                 Denies Drug Use  

 

                Smoking Status  Reviewed: 21 Patient has never smoked  

 

                Exercise Type/Frequency                 Does not exercise  

 

                Sun Exposure                    Does not use sunscreen  

 

                Seat Belt/Car Seat                 Always uses seat belt  







Allergies and adverse reactions





             Active Allergies Criticality  Reaction | Severity Comments     Date

 

             Iodine       Unable to assess criticality                          

 2018







Medications





           Active Medications SIG        Qnty       Indications Ordering Provide

r Date

 

                                        Calcium 600+D High Potency              

       600-400mg-Unit Tablets           

             1 by mouth twice a day 180tabs                   KALYAN KinneyO. 2021

 

                          Prevagen Extra Strength                     20mg Capsu

les                   take

one capsule by mouth daily. 90caps                          KALYAN PalaciosO. 2021

 

                          Memantine HCL                     10mg Tablets        

           Take One Tablet

By Mouth Twice A Day 180tabs         F03.90          PETRONA Palacios.O. 

10/07/2020

 

                                        Fluticasone Propionate Nasal Spray      

               50mcg/Act Suspension     

                two sprays each nostril once daily 29.7ml          J06.9        

   PETRONA Yee.O.                            2020

 

                          Omeprazole                     40mg Capsules DR       

            take one 

capsule by mouth every day 90caps                          PETRONA Palacios.O. 2019

 

                          Diclofenac Sodium                     1% Gel          

         apply 3 grams to 

painful area of left elbow twice a day as needed 100units            M79.642    

         PETRONA Yee.O.                            04/10/2019

 

                          Citalopram Hydrobromide                     10mg Table

ts                   Take 

One Tablet By Mouth Every Day 90tabs                          Brooklyn sherman, D.O. 

 

                          Vitamin D3                     2000Unit Capsules      

             1 by mouth 

every day                                       Unknown         

 

                          Vitamin B-12                     1000mcg Tablets      

             1 by mouth 

every day                                       Unknown         

 

                          Atorvastatin Calcium                     10mg Tablets 

                  Take One

Tablet By Mouth Every Evening 90tabs                          Brooklyn sherman, D.O. 

 

                          Tylenol                     325mg Tablets             

      take 1-2 tabs daily 

as needed                                       Unknown         

 

                                        History Medications

 

                          Bactrim DS                     800-160mg Tablets      

             take one 

tablet by mouth every 12 hours for ten days 20tabs              R35.0           

    PETRONA Palacios.O.                                    10/15/2021 - 10/26/2021

 

                                        Tetanus/Diphtheria Toxoids-Adsorbed Adul

t                     2-2LF/0.5ML 

Suspension                   administer tetanus with pertussis at pharmacy. 5ml 

                

                          PETRONA Palacios.O. 2021 - 2021







Medications Administered in Office





           Medication SIG        Qnty       Indications Ordering Provider Date

 

                          Covid-19 vaccine, Unspecified                      Inj

ection                    

                                                Unknown         10/24/2021

 

                          Covid-19 vaccine, Unspecified                      Inj

ection                    

                                                Unknown         2021

 

                          Covid-19 vaccine, Unspecified                      Inj

ection                    

                                                Unknown         2021







Immunizations





             CPT Code     Status       Date         Vaccine      Lot #

 

                [id number]           Given           2021      Tetanus, Diphthe

aixa Toxoids/Acellular Pertussis Vaccine 7

Or >                                    [id number]

 

             18905        Given        2020   Pneumococcal Vaccine 23 [address]

nt 2Yrs Or Older  

 

                [id number]           Given           2019      Pneumococcal Con

jugate Vaccine 13 Valent For 

Intramuscular Use                        

 

             U-Flu        Given        2019   Influenza,Unspecified  







Vital Signs





                Date            Vital           Result          Comment

 

                10/26/2021  2:48pm BP Systolic     124 mmHg         

 

                    BP Diastolic        72 mmHg              

 

                    Height              61.7 inches         5'1.70"

 

                    Weight              134.00 lb            

 

                    BMI (Body Mass Index) 24.7 kg/m2           

 

                    Heart Rate          81 /min              

 

                    Respiratory Rate    18 /min              

 

                    Body Temperature    98.8 F             

 

                    O2 % BldC Oximetry  100 %                

 

                    Ideal Body Weight   105 lb               

 

                10/15/2021  2:11pm BP Systolic     92 mmHg          

 

                    BP Diastolic        58 mmHg              

 

                    Height              61.7 inches         5'1.70"

 

                    Heart Rate          80 /min              

 

                    Respiratory Rate    12 /min              

 

                    Body Temperature    98.5 F             

 

                    O2 % BldC Oximetry  95 %                 

 

                    Ideal Body Weight   105 lb               







Results





        Test    Acquired Date Facility Test    Result  H/L     Range   Note

 

                    Comprehensive Metabolic Profil 10/21/2021          [facility]

[facility] Testing (Registration)

                                        [address] [place] [address] [phone] Glucose, Fasting 103 mg/dL  High             

 

             Blood Urea Nitrogen 16 mg/dL     Normal       7-18          

 

             Creatinine For GFR 0.92 mg/dL   Normal       0.55-1.30     

 

             Glomerular Filtration Rate > 60.0       Normal       >32          1

 

             Sodium Level 139 mEq/L    Normal       136-145       

 

             Potassium Serum 4.1 mEq/L    Normal       3.5-5.1       

 

             Chloride Level 106 mEq/L    Normal               

 

             Carbon Dioxide Level 27 mEq/L     Normal       21-32         

 

             Anion Gap    6 mEq/L      Low          8-16          

 

             Calcium Level 8.7 mg/dL    Low          8.8-10.2      

 

             Ast/Sgot     21 U/L       Normal       7-37          

 

             Alt/SGPT     24 U/L       Normal       12-78         

 

             Alkaline Phosphatase 96 U/L       Normal               

 

             Bilirubin,Total 0.4 mg/dL    Normal       0.2-1.0       

 

             Total Protein 6.3 GM/DL    Low          6.4-8.2       

 

             Albumin      3.3 GM/DL    Normal       3.2-5.2       

 

             Albumin/Globulin Ratio 1.1          Low          1.2-2.2       

 

                    Influenza A/B RSV Covid Amp 10/21/2021          [facility]

[facility] Testing (Registration)

                                        [address] [place] [address] [phone] Influenza A Amplification NEGATIVE   Normal     Negati

ve   2

 

             Influenza B Amplification NEGATIVE     Normal       Negative     3

 

             RSV Amplification NEGATIVE     Normal       Negative     4

 

             Sars Covid-19 Amplification NEGATIVE     Normal       Negative     

5

 

                    CBC With Differential 10/21/2021          [facility]

ting (Registration)

                                        [address] [place] [address] [phone] White Blood Count 4.9 10     Normal     4.0-10.0    

 

             Red Blood Count 4.13 10      Normal       4.00-5.40     

 

             Hemoglobin   12.8 g/dL    Normal       12.0-15.5     

 

             Hematocrit   38.3 %       Normal       36.0-47.0     

 

             Mean Corpuscular Volume 92.7 fl      Normal       80.0-96.0     

 

             Mean Corpuscular Hemoglobin 31.0 pg      Normal       27.0-33.0    

 

 

             Mean Corpuscular HGB Conc 33.4 g/dL    Normal       32.0-36.5     

 

             Red Cell Distribution Width 13.0 %       Normal       11.5-14.5    

 

 

             Platelet Count, Automated 162 10       Normal       150-450       

 

             Nucleated Red Blood Cell % 0.0 %        Normal       0-0           

 

                    Differential        10/21/2021          [facility]

ng (Registration)

                                        [address] [place], [place] [address] [phone] Neutrophils 57 %       Normal     28-66       

 

             Lymphocytes  25 %         Normal       16-44         

 

             Monocytes    3 %          Normal       0-5           

 

             Eosinophils  4 %          High         0-3           

 

             Atypical Lymph 11 %         High         0-5           

 

             Hypochromasia 1+           Normal                     

 

                    Laboratory test finding 10/21/2021          [facility]

esting (Registration)

                                        [address] [place] [address] [phone] Platelet Estimate DECREASED  Normal     Normal      

 

                    Ua Routine          10/21/2021          [facility]

ng (Registration)

                                        [address] [place] [address] [phone] Appearance, Urine HAZY       Normal     Clear       

 

             Color, Urine YELLOW       Normal       Yellow        

 

             PH,Urine     5.0 units    Normal       5.0-9.0       

 

             Specific Gravity Urine Auto 1.013        Normal       1.002-1.035  

 

 

             Protein, Urine Auto NEGATIVE mg/dL Normal       Negative      

 

             Glucose, Urine (Ua) Auto NEGATIVE mg/dL Normal       Negative      

 

             Ketone, Urine Auto TRACE mg/dL  High         Negative      

 

             Urobilinogen, Urine Auto 0.2 mg/dL    Normal       0.0-2.0       

 

             Bilirubin, Urine Auto NEGATIVE     Normal       Negative      

 

             Nitrite, Urine Auto NEGATIVE     Normal       Negative      

 

             Leukocyte Esterase, Urine Auto NEGATIVE     Normal       Negative  

    

 

             Blood, Urine Blood 1+           High         Negative      

 

             WBC, Urine Auto 0 /HPF       Normal       0-3           

 

             RBC, Urine Auto 0 /HPF       Normal       0-3           

 

             Bacteria, Urine Auto NEGATIVE     Normal       Negative      

 

             Squamous Epithelial Cell Ur AU 8 /HPF       Normal       0-6       

    

 

             Hyaline Cast, Urine Auto 0 /LPF       Normal       0-1           

 

                    Ua Routine          10/19/2021          [facility]

ng (Registration)

                                        [address] [place] [address] [phone] Appearance, Urine HAZY       Normal     Clear       

 

             Color, Urine YELLOW       Normal       Yellow        

 

             PH,Urine     5.0 units    Normal       5.0-9.0       

 

             Specific Gravity Urine Auto 1.018        Normal       1.002-1.035  

 

 

             Protein, Urine Auto NEGATIVE mg/dL Normal       Negative      

 

             Glucose, Urine (Ua) Auto NEGATIVE mg/dL Normal       Negative      

 

             Ketone, Urine Auto NEGATIVE mg/dL Normal       Negative      

 

             Urobilinogen, Urine Auto 2.0 mg/dL    High         0.0-2.0       

 

             Bilirubin, Urine Auto NEGATIVE     Normal       Negative      

 

             Nitrite, Urine Auto NEGATIVE     Normal       Negative      

 

             Leukocyte Esterase, Urine Auto NEGATIVE     Normal       Negative  

    

 

             Blood, Urine Blood 1+           High         Negative      

 

             WBC, Urine Auto 2 /HPF       Normal       0-3           

 

             RBC, Urine Auto 7 /HPF       High         0-3           

 

             Bacteria, Urine Auto NEGATIVE     Normal       Negative      

 

             Squamous Epithelial Cell Ur AU 3 /HPF       Normal       0-6       

    

 

             Mucus, Urine SMALL        Normal       Negative      

 

             Hyaline Cast, Urine Auto 0 /LPF       Normal       0-1           

 

                    Comprehensive Metabolic Profil 10/19/2021          [facility]

[facility] Testing (Registration)

                                        [address] [place] [address] [phone] Glucose, Fasting 79 mg/dL   Normal           

 

             Blood Urea Nitrogen 18 mg/dL     Normal       7-18          

 

             Creatinine For GFR 0.90 mg/dL   Normal       0.55-1.30     

 

             Glomerular Filtration Rate > 60.0       Normal       >32          6

 

             Sodium Level 139 mEq/L    Normal       136-145       

 

             Potassium Serum 4.3 mEq/L    Normal       3.5-5.1       

 

             Chloride Level 108 mEq/L    High                 

 

             Carbon Dioxide Level 27 mEq/L     Normal       21-32         

 

             Anion Gap    4 mEq/L      Low          8-16          

 

             Calcium Level 9.0 mg/dL    Normal       8.8-10.2      

 

             Ast/Sgot     18 U/L       Normal       7-37          

 

             Alt/SGPT     22 U/L       Normal       12-78         

 

             Alkaline Phosphatase 85 U/L       Normal               

 

             Bilirubin,Total 0.3 mg/dL    Normal       0.2-1.0       

 

             Total Protein 6.0 GM/DL    Low          6.4-8.2       

 

             Albumin      3.3 GM/DL    Normal       3.2-5.2       

 

             Albumin/Globulin Ratio 1.2          Normal       1.2-2.2       

 

                    CBC With Differential 10/19/2021          [facility]

ting (Registration)

                                        [address] [place] [address] [phone] White Blood Count 4.5 10     Normal     4.0-10.0    

 

             Red Blood Count 4.05 10      Normal       4.00-5.40     

 

             Hemoglobin   12.7 g/dL    Normal       12.0-15.5     

 

             Hematocrit   38.1 %       Normal       36.0-47.0     

 

             Mean Corpuscular Volume 94.1 fl      Normal       80.0-96.0     

 

             Mean Corpuscular Hemoglobin 31.4 pg      Normal       27.0-33.0    

 

 

             Mean Corpuscular HGB Conc 33.3 g/dL    Normal       32.0-36.5     

 

             Red Cell Distribution Width 13.0 %       Normal       11.5-14.5    

 

 

             Platelet Count, Automated 147 10       Low          150-450       

 

             Neutrophils % 60.3 %       Normal       36.0-66.0     

 

             Lymph %      24.1 %       Normal       24.0-44.0     

 

             Mono %       9.5 %        High         2.0-8.0       

 

             Eos %        5.3 %        High         0.0-3.0       

 

             Baso %       0.4 %        Normal       0.0-1.0       

 

             Immature Granulocyte % 0.4 %        Normal       0-3.0         

 

             Nucleated Red Blood Cell % 0.0 %        Normal       0-0           

 

             Neutrophils # 2.7 10       Normal       1.5-8.5       

 

             Lymph #      1.1 10       Low          1.5-5.0       

 

             Mono #       0.4 10       Normal       0.0-0.8       

 

             Eos #        0.2 10       Normal       0.0-0.5       

 

             Baso #       0.0 10       Normal       0.0-0.2       

 

                    Laboratory test finding 10/19/2021          [facility]

esting (Registration)

                                        [address] [place] [address] [phone] C Reactive Protein Quantitativ 3.08 mg/dL High       0

.00-0.30   







                          1                         Units are mL/min/1.73 m2



Chronic Kidney Disease Staging per NKF:



Stage I & II   GFR >=60       Normal to Mildly Decreased

Stage III      GFR 30-59      Moderately Decreased

Stage IV       GFR 15-29      Severely Decreased

Stage V        GFR <15        Very Little GFR Left

ESRD           GFR <15 on RRT



 

                          2                         Negative results do not prec

lude influenza or RSV virus

infection and should not be used as the sole basis for

treatment or other patient management decisions.



 

                          3                         Negative results do not prec

lude influenza or RSV virus

infection and should not be used as the sole basis for

treatment or other patient management decisions.



 

                          4                         Negative results do not prec

lude influenza or RSV virus

infection and should not be used as the sole basis for

treatment or other patient management decisions.



 

                          5                         A false negative result may 

occur if a specimen is

improperly collected, transported or handled. False negative

results may also occur if inadequate numbers of organisms

are present in the specimen.  As with any molecular test,

mutations within the target regions of [employer]

SARS-CoV-2 could affect primer and/or probe binding

resulting in failure to detect the presence of virus.  This

test cannot rule out diseases caused by other bacterial or

viral pathogens.



DISCLAIMER:

Testing was performed using the [employer] SARS-CoV-2 test.

This test was developed and its performance characteristics

determined by [employer]. This test has not been FDA cleared or

approved. This test has been authorized by FDA under an

Emergency Use Authorization (EUA). This test is only

authorized for the duration of time the declaration that

circumstances exist justifying the authorization of the

emergency use of in vitro diagnostic tests for detection of

SARS-CoV-2 virus and/or diagnosis of COVID-19 infection

under section 564(b)(1) of the Act, 21 U.S.C.

                                        360bbb-3(b)(1), unless the authorization

 is terminated or

revoked sooner.



 

                          6                         Units are mL/min/1.73 m2



Chronic Kidney Disease Staging per NKF:



Stage I & II   GFR >=60       Normal to Mildly Decreased

Stage III      GFR 30-59      Moderately Decreased

Stage IV       GFR 15-29      Severely Decreased

Stage V        GFR <15        Very Little GFR Left

ESRD           GFR <15 on RRT









Procedures





                Date            Code            Description     Status

 

                10/26/2021      87378           Office/Outpatient Established Lo

w MDM 20-29 Min Completed

 

                10/15/2021      81495           Office/Outpatient Established Lo

w MDM 20-29 Min Completed

 

                2021      13097           Office/Outpatient Established Mo

d MDM 30-39 Min Completed

 

                2021      84754           Office/Outpatient Established Lo

w MDM 20-29 Min Completed

 

                2021      65073           Office/Outpatient Established Lo

w MDM 20-29 Min Completed

 

                2020      [id number]        Mammogram       Completed

 

                2019      [id number]        Mammogram       Completed







Medical Devices





                                        Description

 

                                        No Information Available







Encounters





           Type       Date       Location   Provider   Dx         Diagnosis

 

                Office Visit    10/26/2021  2:40p [facility] ELMER Kan               R09.89                    Oth symptoms and signs invol

ving the circ and resp systems

 

                          D72.89                    Other specified disorders of

 white blood cells

 

                Office Visit    10/15/2021  2:00p [facility] ELMER Kan               R42                       Dizziness and giddiness

 

                          R35.0                     Frequency of micturition

 

                Office Visit    2021 10:00a [facility] ELMER Kan               F33.0                     Major depressive disorder, r

ecurrent, mild

 

                          E78.2                     Mixed hyperlipidemia

 

                          K21.9                     Gastro-esophageal reflux dis

ease without esophagitis

 

                          F03.90                    Unspecified dementia without

 behavioral disturbance

 

                          Z87.11                    Personal history of peptic u

lcer disease

 

                          Z79.899                   Other long term (current) dr roberts therapy

 

                Office Visit    2021  8:30a [facility] ELMER Kan               Z48.02                    Encounter for removal of sut

ures

 

                          W10.8xxA                  Fall (on) (from) other stair

s and steps, initial encounter

 

                          S00.83xA                  Contusion of other part of h

ead, initial encounter

 

                          Z23                       Encounter for immunization

 

                Office Visit    2021  1:30p [facility] ELMER Kan               S01.81xA                  Laceration w/o foreign body 

of oth part of head, init 

encntr

 

                          W10.8xxA                  Fall (on) (from) other stair

s and steps, initial encounter

 

                          S00.83xA                  Contusion of other part of h

ead, initial encounter







Assessments





                Date            Code            Description     Provider

 

                    10/26/2021          R09.89              Other specified symp

toms and signs involving the circulatory 

and respiratory systems                 ELMER Kan

 

                10/26/2021      D72.89          Other specified disorders of [facility]

te blood cells ELMER Kan

 

                10/15/2021      R42             Dizziness and giddiness ELMER Kan

 

                10/15/2021      R35.0           Frequency of micturition ELMER Kan

 

                2021      F33.0           Major depressive disorder, recur

rent, mild ELMER Kan

 

                2021      E78.2           Mixed hyperlipidemia ELMER Ramos

 

                2021      K21.9           Gastro-esophageal reflux disease

 without esophagitis ELMER Kan

 

                2021      F03.90          Unspecified dementia without beh

avioral disturbance ELMER Kan

 

                2021      Z87.11          Personal history of peptic ulcer

 disease ELMER Kan

 

                2021      Z79.899         Other long term (current) drug t

herapy ELMER Kan

 

                2021      Z48.02          Encounter for removal of sutures

 ELMER Kan

 

                2021      W10.8xxA        Fall (on) (from) other stairs an

d steps, initial encounter 

ELMER Kan

 

                2021      S00.83xA        Contusion of other part of head,

 initial encounter ELMER Kan

 

                2021      Z23             Encounter for immunization ELMER Soliman

 

                    2021          S01.81xA            Laceration without f

oreign body of other part of head, 

initial encounter                       ELMER Kan

 

                2021      W10.8xxA        Fall (on) (from) other stairs an

d steps, initial encounter 

ELMER Kan

 

                2021      S00.83xA        Contusion of other part of head,

 initial encounter ELMER Kan







Plan of Treatment

Future Appointment(s):* 2022 10:00 am - ELMER Kan at [facility]





Functional Status





                                        Description

 

                                        No Information Available







Mental Status





                                        Description

 

                                        No Information Available







Referrals





                                        Description

 

                                        No Information Available
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
## 2021-11-27 NOTE — CCD
Summarization Of Episode

                             Created on: 2021



AMY OLIVERA

External Reference #: [id number]

: 1935

Sex: Undifferentiated



Demographics





                          Address                   [address] [place] [address] 

[place]  [address]

 

                          Home Phone                [phone]

 

                          Preferred Language        English

 

                          Marital Status            Unknown

 

                          Zoroastrian Affiliation     Unknown

 

                          Race                      Unknown

 

                          Ethnic Group              Not  or 





Author





                          Author                    [facility]

 

                          Organization              [facility]

 

                          Address                   Unknown

 

                          Phone                     Unavailable







Support





                Name            Relationship    Address         Phone

 

                    NETO COE   Next Of Kin         [address] [place]  [address]                     [phone]

 

                    GRACY CO[place]   [place]  [address]                      [phone]

 

                    JAROD,          Next Of Kin         [id number] [place] [address] ADAMA [place]

[place]  [address]                    [phone]

 

                RE              Next Of Kin     Unknown         [phone]

 

                    JAROD  MADHUDINA   Next Of Kin         [id number] [place] [address] 

[place] [address]                    [phone]

 

                    GRACY COE   ECON                [place]  [address]                      Unavailable

 

                    Gladstone J Anthony  ECON                [id number] [facility] 

[place]  [address]                    Unavailable

 

                    GladstoneAnthony jackson   ECON                [address] [place] [address] 

[place] [address]                    Unavailable







Care Team Providers





                    Care Team Member Name Role                Phone

 

                    Fish, B Saúl RODRIGUEZ   Unavailable         Unavailable

 

                    Fish, B Saúl RODRIGUEZ   Unavailable         Unavailable

 

                    Fish, B Saúl RODRIGUEZ   Unavailable         Unavailable

 

                    Fish, B Saúl RODRIGUEZ   Unavailable         Unavailable

 

                    Fish, B Saúl RODRIGUEZ   Unavailable         Unavailable

 

                    Fish, B Saúl RODRIGUEZ   Unavailable         Unavailable

 

                    Fish, B Saúl RODRIGUEZ   Unavailable         Unavailable

 

                    Fish, B Saúl RODRIGUEZ   Unavailable         Unavailable

 

                    Fish, B Saúl RODRIGUEZ   Unavailable         Unavailable

 

                    Fish, B Saúl RODRIGUEZ   Unavailable         Unavailable

 

                    Fish, B Saúl RODRIGUEZ   Unavailable         Unavailable

 

                    Fish, B Saúl RODRIGUEZ   Unavailable         Unavailable

 

                    Fish, B Saúl RODRIGUEZ   Unavailable         Unavailable

 

                    Fish, B Saúl RODRIGUEZ   Unavailable         Unavailable

 

                    Fish, B Saúl RODRIGUEZ   Unavailable         Unavailable

 

                    Fish, B Saúl RODRIGUEZ   Unavailable         Unavailable

 

                    Fish, B Saúl RODRIGUEZ   Unavailable         Unavailable

 

                    Fish, B Saúl RODRIGUEZ   Unavailable         Unavailable

 

                    Fish, B Saúl RODRIGUEZ   Unavailable         Unavailable

 

                    Fish, B Saúl RODRIGUEZ   Unavailable         Unavailable

 

                    Fish, B Saúl RODRIGUEZ   Unavailable         Unavailable

 

                    Fish, B Saúl RODRIGUEZ   Unavailable         Unavailable

 

                    Fish, B Saúl RODRIGUEZ   Unavailable         Unavailable

 

                    Fish, B Saúl RODRIGUEZ   Unavailable         Unavailable

 

                    Fish, B Saúl RODRIGUEZ   Unavailable         Unavailable

 

                    Fish, B Saúl RODRIGUEZ   Unavailable         Unavailable

 

                    Fish, B Saúl RODRIGUEZ   Unavailable         Unavailable

 

                    Fish, B Saúl RODRIGUEZ   Unavailable         Unavailable

 

                    Fish, B Saúl RODRIGUEZ   Unavailable         Unavailable

 

                    Fish, B Saúl RODRIGUEZ   Unavailable         Unavailable

 

                    Fish, B Saúl RODRIGUEZ   Unavailable         Unavailable

 

                    Fish, B Saúl RODRIGUEZ   Unavailable         Unavailable

 

                    Fish, B Saúl RODRIGUEZ   Unavailable         Unavailable

 

                    Fish, B Saúl RODRIGUEZ   Unavailable         Unavailable

 

                    Fish, B Saúl RODRIGUEZ   Unavailable         Unavailable

 

                    Fish, B Saúl RODRIGUEZ   Unavailable         Unavailable

 

                    Fish, B Saúl RODRIGUEZ   Unavailable         Unavailable

 

                    Fish, B Saúl RODRIGUEZ   Unavailable         Unavailable

 

                    Fish, B Saúl RODRIGUEZ   Unavailable         Unavailable

 

                    Fish, B Saúl RODRIGUEZ   Unavailable         Unavailable

 

                    Fish, B Saúl RODRIGUEZ   Unavailable         Unavailable

 

                    Fish, B Saúl RODRIGUEZ   Unavailable         Unavailable

 

                    Fish, B Saúl RODRIGUEZ   Unavailable         Unavailable

 

                    Fish, B Saúl RODRIGUEZ   Unavailable         Unavailable

 

                    Fish, B Saúl RODRIGUEZ   Unavailable         Unavailable

 

                    Fish, B Saúl RODRIGUEZ   Unavailable         Unavailable

 

                    Fish, B Saúl RODRIGUEZ   Unavailable         Unavailable

 

                    Fish, B Saúl RODRIGUEZ   Unavailable         Unavailable

 

                    Fish, B Saúl RODRIGUEZ   Unavailable         Unavailable

 

                    Fish, B Saúl RODRIGUEZ   Unavailable         Unavailable

 

                    Fish, B Saúl RODRIGUEZ   Unavailable         Unavailable

 

                    Fish, B Saúl RODRIGUEZ   Unavailable         Unavailable

 

                    Fish, B Saúl RODRIGUEZ   Unavailable         Unavailable

 

                    Fish, B Saúl RODRIGUEZ   Unavailable         Unavailable

 

                    Fish, B Saúl RODRIGUEZ   Unavailable         Unavailable

 

                    Fish, B Saúl RODRIGUEZ   Unavailable         Unavailable

 

                    MCELHERAN,  MINGO PA Unavailable         Unavailable

 

                    MCELHERAN,  MINGO PA Unavailable         Unavailable

 

                    MCELHERAN,  MINGO PA Unavailable         Unavailable

 

                    MCELHERAN,  MINGO PA Unavailable         Unavailable

 

                    MCELHERAN,  MINGO PA Unavailable         Unavailable

 

                    MCELHERAN,  MINGO PA Unavailable         Unavailable

 

                    MCELHERAN,  MINGO PA Unavailable         Unavailable

 

                    MCELHERAN,  MINGO PA Unavailable         Unavailable

 

                    MCELHERAN,  MINGO PA Unavailable         Unavailable

 

                    MCELHERAN,  MINGO PA Unavailable         Unavailable

 

                    MCELHERAN,  MINGO PA Unavailable         Unavailable

 

                    MCELHERAN,  MINGO PA Unavailable         Unavailable

 

                    MCELHERAN,  MINGO PA Unavailable         Unavailable

 

                    MCELHERAN,  MINGO PA Unavailable         Unavailable

 

                    MCELHERAN,  MINGO PA Unavailable         Unavailable

 

                    MCELAN,  MINGO PA Unavailable         Unavailable

 

                    MCELAN,  MINGO PA Unavailable         Unavailable

 

                    MCELAN,  MINGO PA Unavailable         Unavailable

 

                    MCELHERAN,  MINGO PA Unavailable         Unavailable

 

                    MCELHERAN,  MINGO PA Unavailable         Unavailable

 

                    MCELHERAN,  MINGO PA Unavailable         Unavailable

 

                    MCELHERAN,  MINGO PA Unavailable         Unavailable

 

                    MCELHERAN,  MINGO PA Unavailable         Unavailable

 

                    MCELHERAN,  MINGO PA Unavailable         Unavailable

 

                    MCELHERAN,  MINGO PA Unavailable         Unavailable

 

                    MCELHERAN,  MINGO PA Unavailable         Unavailable

 

                    MCELHERAN,  MINGO PA Unavailable         Unavailable

 

                    MCELHERAN,  MINGO PA Unavailable         Unavailable

 

                    MCELHERAN,  MINGO PA Unavailable         Unavailable

 

                    SOLEDAD-REENA,  VÍCTOR DO Unavailable         Unavailable

 

                    SOLEDAD-REENA,  VÍCTOR DO Unavailable         Unavailable

 

                    SOLEDAD-REENA,  VÍCTOR DO Unavailable         Unavailable

 

                    SOLEDAD-REENA,  VÍCTOR DO Unavailable         Unavailable

 

                    SOLEDAD-REENA,  VÍCTOR DO Unavailable         Unavailable

 

                    SOLEDAD-REENA,  VÍCTOR DO Unavailable         Unavailable

 

                    SOLEDAD-REENA,  VÍCTOR DO Unavailable         Unavailable

 

                    SOLEDAD-REENA,  VÍCTOR DO Unavailable         Unavailable

 

                    SOLEDAD-REENA,  VÍCTOR DO Unavailable         Unavailable

 

                    SOLEDAD-REENA,  VÍCTOR DO Unavailable         Unavailable

 

                    SOLEDAD-REENA,  VÍCTOR DO Unavailable         Unavailable

 

                    SOLEDAD-REENA,  VÍCTOR DO Unavailable         Unavailable

 

                    SOLEDAD-REENA,  VÍCTOR DO Unavailable         Unavailable

 

                    SOLEDAD-REENA,  VÍCTOR DO Unavailable         Unavailable

 

                    SOLEDAD-REENA,  VÍCTOR DO Unavailable         Unavailable

 

                    SOLEDAD-REENA,  VÍCTOR DO Unavailable         Unavailable

 

                    SOLEDAD-REENA,  VÍCTOR DO Unavailable         Unavailable

 

                    SOLEDAD-REENA,  VÍCTOR DO Unavailable         Unavailable

 

                    SOLDEAD-REENA,  VÍCTOR DO Unavailable         Unavailable

 

                    SOLEDAD-REENA,  VÍCTOR DO Unavailable         Unavailable

 

                    SOLEDAD-REENA,  VÍCTOR DO Unavailable         Unavailable

 

                    SOLEDAD-REENA,  VÍCTOR DO Unavailable         Unavailable

 

                    SOLEDAD-REENA,  VÍCTOR DO Unavailable         Unavailable

 

                    SOLEDAD-REENA,  VÍCTOR DO Unavailable         Unavailable

 

                    SOLEDAD-REENA,  VÍCTOR DO Unavailable         Unavailable

 

                    SOLEDAD-REENA,  VÍCTOR DO Unavailable         Unavailable

 

                    SOLEDAD-REENA,  VÍCTOR DO Unavailable         Unavailable

 

                    SOLEDAD-REENA,  VÍCTOR DO Unavailable         Unavailable

 

                    SOLEDAD-REENA,  VÍCTOR DO Unavailable         Unavailable

 

                    SOLEDAD-REENA,  VÍCTOR DO Unavailable         Unavailable

 

                    SOLEDAD-REENA,  VÍCTOR DO Unavailable         Unavailable

 

                    SOLEDAD-REENA,  VÍCTOR DO Unavailable         Unavailable

 

                    SOLEDAD-REENA,  VÍCTOR DO Unavailable         Unavailable

 

                    SOLEDAD-REENA,  VÍCTOR DO Unavailable         Unavailable

 

                    SOLEDAD-REENA,  VÍCTOR DO Unavailable         Unavailable

 

                    SOLEDAD-REENA,  VÍCTOR DO Unavailable         Unavailable

 

                    SOLEDAD-REENA,  VÍCTOR DO Unavailable         Unavailable

 

                    SOLEDAD-REENA,  VÍCTOR DO Unavailable         Unavailable

 

                    SOLEDAD-REENA,  VÍCTOR DO Unavailable         Unavailable

 

                    SOLEDAD-REENA,  VÍCTOR DO Unavailable         Unavailable

 

                    SOLEDAD-REENA,  VÍCTOR DO Unavailable         Unavailable

 

                    SOLEDAD-REENA,  VÍCTOR DO Unavailable         Unavailable

 

                    SOLEDAD-REENA,  VÍCTOR DO Unavailable         Unavailable

 

                    SOLEDAD-REENA,  VÍCTOR DO Unavailable         Unavailable

 

                    SOLEDAD-REENA,  VÍCTOR DO Unavailable         Unavailable

 

                    SOLEDAD-REENA,  VÍCTOR DO Unavailable         Unavailable

 

                    SOLEDAD-REENA,  VÍCTOR DO Unavailable         Unavailable

 

                    SOLEDAD-REENA,  VÍCTOR DO Unavailable         Unavailable

 

                    SOLEDAD-REENA,  VÍCTOR DO Unavailable         Unavailable

 

                    SOLEDAD-REENA,  VÍCTOR DO Unavailable         Unavailable

 

                    SOLEDAD-REENA,  VÍCTOR DO Unavailable         Unavailable

 

                    SOLEDAD-REENA,  VÍCTOR DO Unavailable         Unavailable

 

                    SOLEDAD-REENA,  VÍCTOR DO Unavailable         Unavailable

 

                    SOLEDAD-REENA,  VÍCTOR DO Unavailable         Unavailable

 

                    SOLEDAD-REENA,  VÍCTOR DO Unavailable         Unavailable

 

                    SOLEDAD-REENA,  VÍCTOR DO Unavailable         Unavailable

 

                    SOLEDAD-REENA,  VÍCTOR DO Unavailable         Unavailable

 

                    SOLEDAD-REENA,  VÍCTOR DO Unavailable         Unavailable

 

                    SOLEDAD-REENA,  VÍCTOR DO Unavailable         Unavailable

 

                    SOLEDAD-REENA,  VÍCTOR DO Unavailable         Unavailable

 

                    SOLEDAD-REENA,  VÍCTOR DO Unavailable         Unavailable

 

                    SOLEDAD-REENA,  VÍCTOR DO Unavailable         Unavailable

 

                    SOLEDAD-REENA,  VÍCTOR DO Unavailable         Unavailable

 

                    SOLEDAD-REENA,  VÍCTOR DO Unavailable         Unavailable

 

                    SOLEDAD-REENA,  VÍCTOR DO Unavailable         Unavailable

 

                    SOLEDAD-REENA,  VÍCTOR DO Unavailable         Unavailable

 

                    SOLEDAD-REENA,  VÍCTOR DO Unavailable         Unavailable

 

                    SOLEDAD-REENA,  VÍCTOR DO Unavailable         Unavailable

 

                    SOLEDAD-REENA,  VÍCTOR DO Unavailable         Unavailable

 

                    SOLEDAD-REENA,  VÍCTOR DO Unavailable         Unavailable

 

                    SOLEDAD-REENA,  VÍCTOR DO Unavailable         Unavailable

 

                    SOLEDAD-REENA,  VÍCTOR DO Unavailable         Unavailable

 

                    SOLEDAD-REENA,  VÍCTOR DO Unavailable         Unavailable

 

                    SOLEDAD-REENA,  VÍCTOR DO Unavailable         Unavailable

 

                    SOLEDAD-REENA,  VÍCTOR DO Unavailable         Unavailable

 

                    SOLEDAD-REENA,  VÍCTOR DO Unavailable         Unavailable

 

                    SOLEDAD-REENA,  VÍCTOR DO Unavailable         Unavailable

 

                    SOLEDAD-REENA,  VÍCTOR DO Unavailable         Unavailable

 

                    SOLEDAD-REENA,  VÍCTOR DO Unavailable         Unavailable

 

                    SOLEDAD-REENA,  VÍCTOR DO Unavailable         Unavailable

 

                    SOLEDAD-REENA,  VÍCTOR DO Unavailable         Unavailable

 

                    SOLEDAD-REENA,  VÍCTOR DO Unavailable         Unavailable

 

                    SOLEDAD-REENA,  VÍCTOR DO Unavailable         Unavailable

 

                    SOLEDAD-REENA,  VÍCTOR DO Unavailable         Unavailable

 

                    O'norbert, A Mingo PA Unavailable         Unavailable

 

                    O'norbert, A Mingo PA Unavailable         Unavailable

 

                    O'norbert, A Mingo PA Unavailable         Unavailable

 

                    O'norbert, A Mingo PA Unavailable         Unavailable

 

                    O'norbert, A Mingo PA Unavailable         Unavailable

 

                    O'norbert, A Mingo PA Unavailable         Unavailable

 

                    O'norbert, A Mingo PA Unavailable         Unavailable

 

                    O'norbert, A Mingo PA Unavailable         Unavailable

 

                    O'norbert, A Mingo PA Unavailable         Unavailable

 

                    O'norbert, A Mingo PA Unavailable         Unavailable

 

                    O'norbert, A Mingo PA Unavailable         Unavailable

 

                    O'norbert, A Mingo PA Unavailable         Unavailable

 

                    O'norbert, A Mingo PA Unavailable         Unavailable

 

                    O'norbert, A Mingo PA Unavailable         Unavailable

 

                    O'norbert, A Mingo PA Unavailable         Unavailable

 

                    O'norbert, A Mingo PA Unavailable         Unavailable

 

                    O'norbert, A Mingo PA Unavailable         Unavailable

 

                    O'norbert, A Mingo PA Unavailable         Unavailable

 

                    O'norbert, A Mingo PA Unavailable         Unavailable

 

                    O'norbert, A Mingo PA Unavailable         Unavailable

 

                    O'norbert, A Mingo PA Unavailable         Unavailable

 

                    O'norbert, A Mingo PA Unavailable         Unavailable

 

                    O'norbert, A Mingo PA Unavailable         Unavailable

 

                    O'norbert, A Mingo PA Unavailable         Unavailable

 

                    O'norbert, A Mingo PA Unavailable         Unavailable

 

                    O'norbert, A Mingo PA Unavailable         Unavailable

 

                    O'norbert, A Mingo PA Unavailable         Unavailable

 

                    O'norbert, A Mingo PA Unavailable         Unavailable

 

                    O'norbert, A Mingo PA Unavailable         Unavailable

 

                    O'norbert, A Mingo PA Unavailable         Unavailable

 

                    O'norbert, A Mingo PA Unavailable         Unavailable

 

                    O'norbert, A Mingo PA Unavailable         Unavailable

 

                    O'norbert, A Mingo PA Unavailable         Unavailable

 

                    FORNI, R AFRICA DPM  Unavailable         Unavailable

 

                    FORNI, R AFRICA DPM  Unavailable         Unavailable

 

                    FORNI, R AFRICA DPM  Unavailable         Unavailable

 

                    FORNI, R AFRICA DPM  Unavailable         Unavailable

 

                    FORNI, R AFRICA DPM  Unavailable         Unavailable

 

                    FORNI, R AFRICA DPM  Unavailable         Unavailable

 

                    FORNI, R AFRICA DPM  Unavailable         Unavailable

 

                    FORNI, R AFRICA DPM  Unavailable         Unavailable

 

                    FORNI, R AFRICA DPM  Unavailable         Unavailable

 

                    FORNI, R AFRICA DPM  Unavailable         Unavailable

 

                    FORNI, R AFRICA DPM  Unavailable         Unavailable

 

                    FORNI, R AFRICA DPM  Unavailable         Unavailable

 

                    FORNI, R AFRICA DPM  Unavailable         Unavailable

 

                    FORNI, R AFRICA DPM  Unavailable         Unavailable

 

                    FORNI, R AFRICA DPM  Unavailable         Unavailable

 

                    FORNI, R AFRICA DPM  Unavailable         Unavailable

 

                    FORNI, R AFRICA DPM  Unavailable         Unavailable

 

                    FORNI, R AFRICA DPM  Unavailable         Unavailable

 

                    FORNI, R AFRICA DPM  Unavailable         Unavailable

 

                    FORNI, R AFRICA DPM  Unavailable         Unavailable

 

                    CARMENCITA, A DINORAH DO Unavailable         Unavailable

 

                    CARMENCITA, A DINORAH DO Unavailable         Unavailable

 

                    CAREMNCITA, A DINORAH DO Unavailable         Unavailable

 

                    CARMENCITA, A DINORAH DO Unavailable         Unavailable

 

                    CARMENCITA, A DINORAH DO Unavailable         Unavailable

 

                    CARMENCITA, A DINORAH DO Unavailable         Unavailable

 

                    CARMENCITA, A DINORAH DO Unavailable         Unavailable

 

                    CARMENCITA, A DINORAH DO Unavailable         Unavailable

 

                    CARMENCITA, A DINORAH DO Unavailable         Unavailable

 

                    CARMENCITA, A DINORAH DO Unavailable         Unavailable

 

                    CARMENCITA, A DINORAH DO Unavailable         Unavailable

 

                    CARMENCITA, A DINORAH DO Unavailable         Unavailable

 

                    CARMENCITA, A DINORAH DO Unavailable         Unavailable

 

                    CARMENCITA, A DINORAH DO Unavailable         Unavailable

 

                    CARMENCITA, A DINORAH DO Unavailable         Unavailable

 

                    CARMENCITA, A DINORAH DO Unavailable         Unavailable

 

                    CARMENCITA, A DINORAH DO Unavailable         Unavailable

 

                    CARMENCITA, A DINORAH DO Unavailable         Unavailable

 

                    CARMENCITA, A DINORAH DO Unavailable         Unavailable

 

                    CARMENCITA, A DINORAH DO Unavailable         Unavailable

 

                    CARMENCITA, A DINORAH DO Unavailable         Unavailable

 

                    CARMENCITA, A DINORAH DO Unavailable         Unavailable

 

                    CARMENCITA, A DINORAH DO Unavailable         Unavailable



                                  



Re-disclosure Warning

          The records that you are about to access may contain information from 
federally-assisted alcohol or drug abuse programs. If such information is 
present, then the following federally mandated warning applies: This information
has been disclosed to you from records protected by federal confidentiality 
rules (42 CFR part 2). The federal rules prohibit you from making any further 
disclosure of this information unless further disclosure is expressly permitted 
by the written consent of the person to whom it pertains or as otherwise 
permitted by 42 CFR part 2. A general authorization for the release of medical 
or other information is NOT sufficient for this purpose. The Federal rules 
restrict any use of the information to criminally investigate or prosecute any 
alcohol or drug abuse patient.The records that you are about to access may 
contain highly sensitive health information, the redisclosure of which is 
protected by Article 27-F of the [facility] Public [facility] law. If you 
continue you may have access to information: Regarding HIV / AIDS; Provided by 
facilities licensed or operated by the [facility] Office of Mental Health; 
or Provided by the [facility] Office for People With Developmental 
Disabilities. If such information is present, then the following [place] 
mandated warning applies: This information has been disclosed to you from 
confidential records which are protected by state law. State law prohibits you 
from making any further disclosure of this information without the specific 
written consent of the person to whom it pertains, or as otherwise permitted by 
law. Any unauthorized further disclosure in violation of state law may result in
a fine or [facility] sentence or both. A general authorization for the release of 
medical or other information is NOT sufficient authorization for further disc
losure.                                                                         
    



Family History

          



             Family Member Name Family Member Gender Family Member Status Date o

f Status 

Description                             Data Source(s)

 

           Unknown    Male       Problem                          MEDENT ([facility])

 

           Unknown    Male       Problem                          [facility] ([facility])

 

                                        () 

 

           Unknown    Female     Problem                          [facility] ([facility])



                                                                                
                           



Encounters

          



           Encounter  Providers  Location   Date       Indications Data Source(s

)

 

                Outpatient      Attender: AFRICA PITT DPM                 2021 02:30:00 PM EDT - 2021 

02:30:00 PM EDT                                     [facility]

 

             Outpatient   Attender: AFRICA PITT DPM Family Practice 2021 0

2:30:00 PM EDT 

                                        MED[facility] ([facility])

 

                Outpatient      Attender: Mingo PAYNE [facility] 

10/26/2021 02:40:00 PM EDT                           [facility] ([facility])

 

                Unknown                         [address] [place]

[place] [address] 10/20/2021 12:00:00 AM 

EDT                                                 eCW1 ([facility])

 

                Outpatient      Attender: Mingo PAYNE [facility] 

10/15/2021 02:00:00 PM EDT                           [facility] ([facility])

 

             Outpatient   Attender: Saúl Tejada MD Physical Therapy 2021 0

2:45:00 PM EDT 

                                        [facility] ([facility])

 

                Outpatient      Attender: Mingo PAYNE [facility] 

2021 10:00:00 AM EDT                           [facility] ([facility])

 

                                        <td ID="encounterTypeDescriptionID0">TRI

AGE NON URGENT</td><td>Dinorah Dixon DO</td><td>Jax Schrader MD 
[facility]</td><td>2021</td><td>1:58PM</td><td>3:54PM</td><td></td>Outpatient 

Attender: DINORAH Villafuerte MD [facility]  2021 01:58:00 PM 

EDT - 2021 03:54:00 PM EDT                           LENNY (Jax ePrez MD [facility])

 

                Outpatient      Attender: Mingo PAYNE [facility] 

2021 08:30:00 AM EDT                           MED[facility] ([facility])

 

                Outpatient      Attender: Mingo PAYNE [facility] 

2021 01:30:00 PM EDT                           MED[facility] ([facility])

 

                Office Visit    Attender: Mingo PAYNE [facility] 

2021 11:00:00 AM EDT                           [facility] ([facility])

 

                    Outpatient          Attender: VÍCTOR SANDERSON DO [facility]                2021 09:20:00 AM EST                     MED[facility] ([facility]

[facility])

 

                Outpatient      Attender: MINGO PAYNE Physical Therapy 

2020 10:15:00 

AM EST                                              [facility] ([facility]

 

             Outpatient   Attender: Saúl Tejada MD Physical Therapy 2020 0

1:15:00 PM EST 

                                        [facility] ([facility])

 

                                        Outpatient<td ID="encounterTypeDescripti

onID1">3 - 4 WK Post CAT 

SX</td><td>Dinorah Dixon DO</td><td>Jax Schrader MD 
[facility]</td><td>2020</td><td>2:07PM</td><td>4:00PM</td><td><content 
ID="encounterDiagnosisID1-0">Pseudophakia</content></td> Attender: DINORAH Villafuerte MD [facility]  2020 02:07:00 PM EST -

 2020 

04:00:00 PM EST                         

PseudophakiaPseudophakiaPseudophakiaPseudophakiaPseudophakiaPseudophakia 

LENNY (Jax Perez MD [facility])

 

                                        Pseudophakia 

 

                                        Pseudophakia 

 

                                        Pseudophakia 

 

                                        Pseudophakia 

 

                                        Pseudophakia 

 

                                        Pseudophakia 

 

                Outpatient      Attender: Mingo PAYNE [facility] 

10/07/2020 10:40:00 AM EDT                           [facility]

 

                                        <td ID="encounterTypeDescriptionID2">1 W

Nikolai Post OP</td><td>Dinorah Dixon 

DO</td><td>Jax Schrader MD 
[facility]</td><td>10/02/2020</td><td>12:17PM</td><td>1:27PM</td><td><content 
ID="encounterDiagnosisID2-0">Pseudophakia</content></td>Outpatient Attender: 

DINORAH Villafuerte MD [facility]  10/02/2020 12:17:00 PM EDT -

 

10/02/2020 01:27:00 PM EDT              

PseudophakiaPseudophakiaPseudophakiaPseudophakiaPseudophakiaPseudophakia 

LENNY (Jax Perez MD [facility])

 

                                        Pseudophakia 

 

                                        Pseudophakia 

 

                                        Pseudophakia 

 

                                        Pseudophakia 

 

                                        Pseudophakia 

 

                                        Pseudophakia 



                                                                                
                                                                                
                                                                                
     



Immunizations

          



             Vaccine      Date         Status       Description  Data Source(s)

 

             COVID-19 VACCINE Moderna 10/24/2021 12:00:00 AM EDT completed      

           [facility]

 

                                        Vaccine Series Complete: YESThis Data wa

s Submitted to [facility]. 

 

             Tdap         2021 09:07:00 AM EDT completed                 M

KAREEN ([facility])

 

                    COVID-19 VACCINE, MRNA-1273, LNP-S (MODERNA)/PF 2021 1

2:00:00 AM EST 

completed                                           [facility]

 

             COVID-19 VACCINE Moderna 2021 12:00:00 AM EST completed      

           [facility]

 

                                        Vaccine Series Complete: NOThis Data was

 Submitted to [facility]. 

 

                    COVID-19 VACCINE, MRNA-1273, LNP-S (MODERNA)/PF 2021 1

2:00:00 AM EST 

completed                                           [facility]

 

             pneumococcal polysaccharide PPV23 2020 07:41:00 AM EST comple

robin                 [facility] 

([facility])



                                                                                
                                                         



Medications

          



          Medication Brand Name Start Date Product Form Dose      Route     Admi

nistrative 

Instructions Pharmacy Instructions Status     Indications Reaction   Description

 Data 

Source(s)

 

       Covid-19 vaccine, Unspecified        10/24/2021 12:00:00 AM EDT          

                          completed  

                                                            [facility] ([facility])

 

                                        Medication administered onsite 

 

                    Sulfamethoxazole 800 MG / Trimethoprim 160 MG Oral Tablet [B

actrim] Bactrim DS          

10/15/2021 12:00:00 AM EDT               ORAL                 completed         

             [facility] ([facility])

 

                                        diphtheria toxoid vaccine, inactivated 4

 UNT/ML / tetanus toxoid vaccine, 

inactivated 4 UNT/ML Injectable Suspension Tetanus/Diphtheria Toxoids-Adsorbed 

Adult 2021 12:00:00 AM EDT                               completed        

           [facility] ([facility])

 

                          Calcium Carbonate 1500 MG / Cholecalciferol 400 UNT Or

al Tablet Calcium 600+D 

High Potency 2021 12:00:00 AM EDT             ORAL              active    

               [facility] ([facility])

 

     Prevagen Extra Strength      2021 12:00:00 AM EDT           ORAL     

      active                

[facility] ([facility])

 

       Covid-19 vaccine, Unspecified        2021 12:00:00 AM EST          

                          completed  

                                                            [facility] ([facility])

 

                                        Medication administered onsite 

 

       Covid-19 vaccine, Unspecified        2021 12:00:00 AM EST          

                          completed  

                                                            [facility] ([facility])

 

                                        Medication administered onsite 

 

                    Memantine hydrochloride 10 MG Oral Tablet Memantine HCL     

  10/07/2020 12:00:00 AM 

EDT                                       active                      MED[facility] ([facility])

 

                                        0.5 ML Streptococcus pneumoniae serotype

 1 capsular antigen diphtheria [id number] 

protein conjugate vaccine 0.0044 MG/ML / Streptococcus pneumoniae serotype 14 
capsular antigen diphtheria [id number] protein conjugate vaccine 0.0044 MG/ML / 
Streptococcus pneumonia Prevnar 13 10/07/2020 12:00:00 AM EDT                   

                      

completed                                                       [facility] ([facility])

 

                                        0.5 ML pneumococcal capsular polysacchar

cristine type 1 vaccine 0.05 MG/ML / 

pneumococcal capsular polysaccharide type 10A vaccine 0.05 MG/ML / pneumococcal 
capsular polysaccharide type 11A vaccine 0.05 MG/ML / pneumococcal capsular 
polysaccharide type 12F vac Pneumovax 23 10/07/2020 12:00:00 AM EDT             

                            

active                                                          MED[facility] ([facility])

 

                                        besifloxacin 6 MG/ML Ophthalmic Suspensi

on [Besivance] Besivance 0.6% Ophthalmic

 Suspension  Besivance 0.6% Ophthalmic Suspension 09/10/2020 12:00:00 AM EDT    

                       

                              aborted                       besifloxacin 6 MG/ML

 Ophthalmic Suspension [Besivance] 

LENNY (Jax Perez MD [facility])

 

                    Memantine hydrochloride 5 MG Oral Tablet Memantine HCL      

 2020 12:00:00 AM 

EDT                                       completed                      [facility] 

([facility])



                                                                                
                                                                                
                  



Insurance Providers

          



             Payer name   Policy type / Coverage type Policy ID    Covered party

 ID Covered 

party's relationship to holcomb Policy [place]             Plan Information

 

          [address] [employer]           [id number]        

                 

 

          [place] Medigap Part B           [id number]    Self            

     

 

          MEDICARE COMPLETE           [id number]           SP                  [id number]

[id number]

 

          MEDICARE COMPLETE           [id number]           SP                  [id number]

[id number]

 

          MEDICARE COMPLETE           [id number]           SP                  91

[id number]

 

          [employer]NA MEDICARE           [id number]            SP                  MEBRM

8MG

 

          [employer] MEDICARE           [id number]            SP                  MEBRM

8MG

 

          MEDICARE COMPLETE           [id number]           SP                  

[id number]

 

          [employer]NA MEDICARE           [id number]            SP                  MEBRM

8MG

 

          [employer] O         [id number]           S                   VYM

[id number]

 

          [facility] COMMUNITY PLAN MCDO           [id number]           SP        

          [id number]

 

          MEDICARE COMPLETE           [id number]           SP                 

 [id number]

 

          [facility]           [id number]            [id number]                 

[id number]

 

          [facility]  [employer] P         [id number] P       

            [id number]

 

          [facility] KELLY           UNAVAILABLE                             

  UNAVAILABLE

 

          [facility] KELLY           [id number]           SP               

   [id number]

 

          [employer] P         [id number] S                   VYM

[id number]

 

          MEDICARE BLUE PPO      [id number]           [id number]           SP        

          [id number]

 

          [facility] COMMUNITY PLAN MCDO           [id number]           SP         

         [id number]

 

          [facility] [employer] O         [id number] S            

       [id number]

 

                              [id number]                               [id number]

 

          MEDICARE COMPLETE-[facility] O         [id number]           S               

    [id number]

 

          AETNA MEDICARE O         [id number]  [id number] S                   MEBRM

[id number]

 

          [employer]ETNA MEDICARE CO        [id number]            [id number]                  MEBRM

8MG

 

          MEDICARE COMPLETE           [id number]           SP                  91

[id number]

 

          [facility]         [id number] S                 

  [id number]

 

                [facility] Medicare Commercial      [id number]    

[id number] Self                                    

[id number]

 

                    ANSI-Medicare Part B [id number]   

                            

[id number]

 

                    ANSI-Medicare Part B [id number]   

                            

[id number]

 

                    ANSI-Medicare Part B [id number]   

                            

[id number]

 

                    ANSI-Medicare Part B [id number]   

                            

[id number]

 

          MEDICARE COMPLETE           [id number]           SP                  

[id number]

 

          AETNA MEDICARE O         [id number]  [id number] S                   MEBRM

[id number]

 

          [employer]     Commercial [id number]  [id number] S

elf                [id number]

 

          [employer]     Commercial [id number]  [id number]

[id number] Self                

[id number]

 

          [employer]CK [employer] or [employer] SALON           [id number]           SP                  [id number]

[id number]

 

          MEDICARE            [id number]                             [id number]

A



                                                                                
                                                                                
                                                                                
                                                                                
                                                                                
                                                



Problems, Conditions, and Diagnoses

          



           Code       Display Name Description Problem Type Effective Dates Data

 Source(s)

 

             A05100       Pain in right toe(s) Pain in right toe(s) Diagnosis   

 2021 02:30:00 

PM EDT                                  [facility]

 

           L600       Ingrowing nail Ingrowing nail Diagnosis  2021 02:30:

00 PM EDT [facility]

 

             366.16       Cataract Senile Nuclear Cataract Senile Nuclear Proble

      2020 

12:00:00 AM EDT - 10/02/2020 12:00:00 AM EDT LENNY (Jax Perez MD 

[facility])

 

             366.16       Cataract Senile Nuclear Cataract Senile Nuclear Proble

      2020 

12:00:00 AM EDT - 10/02/2020 12:00:00 AM EDT LENNY (Jax Perez MD 

[facility])

 

             366.16       Cataract Senile Nuclear Cataract Senile Nuclear Proble

      2020 

12:00:00 AM EDT - 10/02/2020 12:00:00 AM EDT LENNY (Jax Perez MD 

[facility])

 

             366.16       Cataract Senile Nuclear Cataract Senile Nuclear Proble

      2020 

12:00:00 AM EDT - 10/02/2020 12:00:00 AM EDT LENNY (Jax Perez MD 

[facility])

 

             366.16       Cataract Senile Nuclear Cataract Senile Nuclear Proble

      2020 

12:00:00 AM EDT - 10/02/2020 12:00:00 AM EDT LENNY (Jax Perez MD 

[facility])

 

             366.16       Cataract Senile Nuclear Cataract Senile Nuclear Proble

      2020 

12:00:00 AM EDT - 10/02/2020 12:00:00 AM EDT LENNY (Jax Perez MD 

[facility])



                                                                                
                                                                                
        



Surgeries/Procedures

          



             Procedure    Description  Date         Indications  Data Source(s)

 

             Debridement Nails Any Method 1-5              2021 12:00:00 A

M EDT              [facility]

 

             OFFICE OUTPATIENT NEW 20 MINUTES              2021 12:00:00 A

M EDT              [facility] ([facility])

 

             OFFICE OUTPATIENT VISIT 15 MINUTES              10/26/2021 12:00:00

 AM EDT              [facility]

 

             OFFICE OUTPATIENT VISIT 15 MINUTES              10/15/2021 12:00:00

 AM EDT              [facility]

 

             ARTHROCENTESIS ASPIR&/INJECTION MAJOR JT/BURSA              

021 12:00:00 AM EDT              

MEDENT ([facility])

 

             OFFICE OUTPATIENT VISIT 25 MINUTES              2021 12:00:00

 AM EDT              [facility] ([facility])

 

             OFFICE OUTPATIENT VISIT 25 MINUTES              2021 12:00:00

 AM EDT              [facility] ([facility])

 

                          Extracapsular extraction of lens (procedure) History o

f extracapsular cataract 

extraction  PCIOL OS by Dr. Dixon 2020 ~PCIOL OD by Dr. Dixon 
2021 12:00:00 AM EDT                     LENNY (Young Perez MD [facility])

 

             OFFICE OUTPATIENT VISIT 15 MINUTES              2021 12:00:00

 AM EDT              [facility] ([facility])

 

             OFFICE OUTPATIENT VISIT 15 MINUTES              2021 12:00:00

 AM EDT              [facility] ([facility])

 

             THERAPEUTIC PX 1/> AREAS EACH 15 MIN EXERCISES               12:00:00 AM EST              

MED[facility] ([facility])

 

             MANUAL THERAPY TQS 1/> REGIONS EACH 15 MINUTES               12:00:00 AM EST              

[facility] ([facility])

 

             APPLICATION MODALITY 1/> AREAS HOT/COLD PACKS              20

20 12:00:00 AM EST              

MED[facility] ([facility])

 

             Physical Therapy Eval - Mod Complexity              2020 12:0

0:00 AM EST              MED[facility] 

([facility])

 

             ARTHROCENTESIS ASPIR&/INJECTION MAJOR JT/BURSA              

020 12:00:00 AM EST              

[facility] ([facility])

 

                          Extracapsular extraction of lens (procedure) History o

f extracapsular cataract 

extraction  PCIOL OS by Dr. Dixon 2020 ~PCIOL OD by Dr. Dixon 
2020 12:00:00 AM EST                     LENNY (Young Perez MD [facility])

 

             Mammography (procedure)              2020 12:00:00 AM EST    

          [facility] ([facility])

 

                    Surgical / procedural history Surgical / procedural history 

10/02/2020 12:00:00 

AM VIANCA GALVEZ (Jax Perez MD [facility])

 

                          Extracapsular extraction of lens (procedure) History o

f extracapsular cataract 

extraction  PCIOL OS by Dr. Dixon 2020 ~PCIOL OD by Dr. Dixon 
2020           10/02/2020 12:00:00 AM EDT                     LENNY (Young

id LESLEE Perez MD [facility])



                                                                                
                                                                                
                                                                                
                            



Results

          



                    ID                  Date                Data Source

 

                    [id number]            10/21/2021 12:33:00 PM EDT [facility] ([facility])









          Name      Value     Range     Interpretation Code Description Data Kamla

rce(s) Supporting 

Document(s)

 

                Platelets [#/volume] in Blood by Estimate Laboratory test result

                 Normal 

(applies to non-numeric results)                           [facility] ([facility])

                                         









                    ID                  Date                Data Source

 

                    [id number]            10/21/2021 12:33:00 PM EDT [facility] ([facility])









          Name      Value     Range     Interpretation Code Description Data Kamla

rce(s) Supporting 

Document(s)

 

           Neutrophils 57 %       28-66      Normal (applies to non-numeric resu

lts)            [facility] ([facility])           

 

           Lymphocytes 25 %       16-44      Normal (applies to non-numeric resu

lts)            [facility] ([facility])           

 

           Atypical Lymph 11 %       0-5        Above high normal            MED

ENT ([facility])                                

 

           Eosinophils 4 %        0-3        Above high normal            MED[facility]

 ([facility])                                    

 

           Monocytes  3 %        0-5        Normal (applies to non-numeric resul

ts)            [facility] ([facility])           

 

             Hypochromasia Laboratory test result              Normal (applies t

o non-numeric results)  

                          [facility]  









                    ID                  Date                Data Source

 

                    [id number]            10/21/2021 12:33:00 PM EDT [facility] ([facility])









          Name      Value     Range     Interpretation Code Description Data Kamla

rce(s) Supporting 

Document(s)

 

           White Blood Count 4.9 10     4.0-10.0   Normal (applies to non-numeri

c results)            

[facility] ([facility])  

 

           Red Blood Count 4.13 10    4.00-5.40  Normal (applies to non-numeric 

results)            

[facility] ([facility])  

 

           Hemoglobin 12.8 g/dL  12.0-15.5  Normal (applies to non-numeric resul

ts)            [facility] 

([facility])   

 

                Mean Corpuscular Hemoglobin 31.0 pg         27.0-33.0       Norm

al (applies to non-numeric 

results)                                [facility] ([facility])  

 

           Hematocrit 38.3 %     36.0-47.0  Normal (applies to non-numeric resul

ts)            [facility] 

([facility])   

 

                Mean Corpuscular Volume 92.7 fl         80.0-96.0       Normal (

applies to non-numeric 

results)                                [facility] ([facility])  

 

                Mean Corpuscular HGB Conc 33.4 g/dL       32.0-36.5       Normal

 (applies to non-numeric 

results)                                [facility] ([facility])  

 

                Red Cell Distribution Width 13.0 %          11.5-14.5       Norm

al (applies to non-numeric 

results)                                [facility] ([facility])  

 

           Nucleated Red Blood Cell % 0.0 %      0-0        Normal (applies to n

on-numeric results)            

[facility] ([facility])  

 

                Platelet Count, Automated 162 10          150-450         Normal

 (applies to non-numeric results)

                                        [facility])  









                    ID                  Date                Data Source

 

                    [id number]            10/21/2021 12:33:00 PM EDT [facility] ([facility])









          Name      Value     Range     Interpretation Code Description Data Kamla

rce(s) Supporting 

Document(s)

 

                Influenza A Amplification Laboratory test result                

 Normal (applies to non-

numeric results)                        [facility])  

 

                                        Negative results do not preclude influen

za or RSV virus

infection and should not be used as the sole basis for

treatment or other patient management decisions.

 

 

                Influenza B Amplification Laboratory test result                

 Normal (applies to non-

numeric results)                        [facility])  

 

                                        Negative results do not preclude influen

za or RSV virus

infection and should not be used as the sole basis for

treatment or other patient management decisions.

 

 

                RSV Amplification Laboratory test result                 Normal 

(applies to non-numeric 

results)                                [facility])  

 

                                        Negative results do not preclude influen

za or RSV virus

infection and should not be used as the sole basis for

treatment or other patient management decisions.

 

 

                Laboratory test finding (navigational concept) Laboratory test r

esult                 Normal 

(applies to non-numeric results)                           [facility])

                                         

 

                                        A false negative result may occur if a s

pecimen is

improperly collected, transported or handled. False negative

results may also occur if inadequate numbers of organisms

are present in the specimen.  As with any molecular test,

mutations within the target regions of X[employer] Xpress

SARS-CoV-2 could affect primer and/or probe binding

resulting in failure to detect the presence of virus.  This

test cannot rule out diseases caused by other bacterial or

viral pathogens.



DISCLAIMER:

Testing was performed using the [employer] SARS-CoV-2 test.

This test was developed and its performance characteristics

determined by [employer]. This test has not been FDA cleared or

approved. This test has been authorized by FDA under an

Emergency Use Authorization (EUA). This test is only

authorized for the duration of time the declaration that

circumstances exist justifying the authorization of the

emergency use of in vitro diagnostic tests for detection of

SARS-CoV-2 virus and/or diagnosis of COVID-19 infection

under section 564(b)(1) of the Act, 21 U.S.C.

                                        360bbb-3(b)(1), unless the authorization

 is terminated or

revoked sooner.

 









                    ID                  Date                Data Source

 

                    [id number]            10/21/2021 12:33:00 PM EDT [facility] ([facility])









          Name      Value     Range     Interpretation Code Description Data Kamla

rce(s) Supporting 

Document(s)

 

           Glucose, Fasting 103 mg/dL       Above high normal            [facility]

[facility]                      

 

             Creatinine For GFR 0.92 mg/dL   0.55-1.30    Normal (applies to non

-numeric results) 

                          [facility] ([facility])  

 

           Blood Urea Nitrogen 16 mg/dL   7-18       Normal (applies to non-nume

vlad results)            

[facility] ([facility])  

 

                Glomerular Filtration Rate Laboratory test result               

  Normal (applies to non-

numeric results)                        [facility] ([facility])  

 

                                        <content>Units are mL/min/1.73 

m2</content><br/><content></content><br/><content>Chronic Kidney Disease Staging
 per NKF:</content><br/><content></content><br/><content>Stage I & II   GFR >=60
       Normal to Mildly Decreased</content><br/><content>Stage III      GFR 30-
59      Moderately Decreased</content><br/><content>Stage IV       GFR 15-29    
  Severely Decreased</content><br/><content>Stage V        GFR <15        Very 
Little GFR Left</content><br/><content>ESRD           GFR <15 on 
RRT</content><br/><content></content> 

 

           Sodium Level 139 meq/L  136-145    Normal (applies to non-numeric res

ults)            [facility] 

([facility])   

 

           Carbon Dioxide Level 27 meq/L   21-32      Normal (applies to non-num

nehemiah results)            

[facility] ([facility])  

 

           Potassium Serum 4.1 meq/L  3.5-5.1    Normal (applies to non-numeric 

results)            

[facility] ([facility])  

 

           Chloride Level 106 meq/L       Normal (applies to non-numeric r

esults)            [facility]

 ([facility])  

 

           Anion Gap  6 meq/L    8-16       Below low normal            [facility] (

[facility])                                

 

           Calcium Level 8.7 mg/dL  8.8-10.2   Below low normal            [facility]

T ([facility])                       

 

           Alt/SGPT   24 U/L     12-78      Normal (applies to non-numeric resul

ts)            [facility] ([facility])           

 

           Ast/Sgot   21 U/L     7-37       Normal (applies to non-numeric resul

ts)            [facility] ([facility])           

 

           Bilirubin,Total 0.4 mg/dL  0.2-1.0    Normal (applies to non-numeric 

results)            

[facility] ([facility])  

 

           Alkaline Phosphatase 96 U/L          Normal (applies to non-num

nehemiah results)            

[facility] ([facility])  

 

           Albumin/Globulin Ratio 1.1        1.2-2.2    Below low normal        

    [facility] ([facility])                    

 

           Albumin    3.3 GM/DL  3.2-5.2    Normal (applies to non-numeric resul

ts)            [facility] 

([facility])   

 

           Total Protein 6.3 GM/DL  6.4-8.2    Below low normal            [facility]

[facility] ([facility])                       









                    ID                  Date                Data Source

 

                    [id number]            10/21/2021 12:33:00 PM [facility]T [facility]









          Name      Value     Range     Interpretation Code Description Data Kamla

rce(s) Supporting 

Document(s)

 

                                        SARS coronavirus 2 RNA [Presence] in Res

piratory specimen by [facility] with probe 

detection  NEGATIVE                                    [facility]      

 

                                        This lab was ordered by [facility] a

nd reported by [facility].

 









                    ID                  Date                Data Source

 

                    [id number]            10/21/2021 12:32:00 PM EDT [facility] ([facility])









          Name      Value     Range     Interpretation Code Description Data Kamla

rce(s) Supporting 

Document(s)

 

                Appearance, Urine Laboratory test result                 Normal 

(applies to non-numeric 

results)                                [facility] ([facility])  

 

           PH,Urine   5.0 units  5.0-9.0    Normal (applies to non-numeric resul

ts)            [facility] 

([facility])   

 

           Color, Urine Laboratory test result            Normal (applies to non

-numeric results)            

[facility] ([facility])  

 

                Protein, Urine Auto Laboratory test result                 [place]

l (applies to non-numeric 

results)                                [facility] ([facility])  

 

                Specific Gravity Urine Auto 1.013           1.002-1.035     Norm

al (applies to non-numeric 

results)                                [facility]  

 

           Ketone, Urine Auto Laboratory test result            Above high kinga

l            [facility]           

 

                Glucose, Urine (Ua) Auto Laboratory test result                 

Normal (applies to non-numeric

 results)                               [facility] ([facility])  

 

                Urobilinogen, Urine Auto 0.2 mg/dL       0.0-2.0         Normal 

(applies to non-numeric 

results)                                [facility]  

 

                Bilirubin, Urine Auto Laboratory test result                 Nor

mal (applies to non-numeric 

results)                                [facility]  

 

                Nitrite, Urine Auto Laboratory test result                 [place]

l (applies to non-numeric 

results)                                [facility]  

 

           Blood, Urine Blood Laboratory test result            Above high kinga

l            [facility] ([facility])           

 

                Leukocyte Esterase, Urine Auto Laboratory test result           

      Normal (applies to non-

numeric results)                        [facility] ([facility])  

 

           WBC, Urine Auto 0 /HPF     0-3        Normal (applies to non-numeric 

results)            [facility] 

([facility])   

 

           RBC, Urine Auto 0 /HPF     0-3        Normal (applies to non-numeric 

results)            [facility] 

([facility])   

 

           Hyaline Cast, Urine Auto 0 /LPF     0-1        Normal (applies to non

-numeric results)            

[facility]  

 

                Bacteria, Urine Auto Laboratory test result                 Norm

al (applies to non-numeric 

results)                                [facility] ([facility])  

 

                Squamous Epithelial Cell Ur AU 8 /HPF          0-6             N

ormal (applies to non-numeric 

results)                                [facility]  









                    ID                  Date                Data Source

 

                    [id number]            10/19/2021 11:03:00 AM EDT [facility] ([facility])









          Name      Value     Range     Interpretation Code Description Data Kamla

rce(s) Supporting 

Document(s)

 

                Appearance, Urine Laboratory test result                 Normal 

(applies to non-numeric 

results)                                [facility] ([facility])  

 

           Color, Urine Laboratory test result            Normal (applies to non

-numeric results)            

[facility]  

 

                Specific Gravity Urine Auto 1.018           1.002-1.035     Norm

al (applies to non-numeric 

results)                                [facility]  

 

           PH,Urine   5.0 units  5.0-9.0    Normal (applies to non-numeric resul

ts)            [facility] 

([facility])   

 

                Protein, Urine Auto Laboratory test result                 [place]

l (applies to non-numeric 

results)                                [facility] ([facility])  

 

                Ketone, Urine Auto Laboratory test result                 Normal

 (applies to non-numeric 

results)                                [facility]  

 

                Glucose, Urine (Ua) Auto Laboratory test result                 

Normal (applies to non-numeric

 results)                               [facility] ([facility])  

 

           Urobilinogen, Urine Auto 2.0 mg/dL  0.0-2.0    Above high normal     

       [facility] ([facility])           

 

                Bilirubin, Urine Auto Laboratory test result                 Nor

mal (applies to non-numeric 

results)                                [facility] ([facility])  

 

                Nitrite, Urine Auto Laboratory test result                 [place]

l (applies to non-numeric 

results)                                [facility] ([facility])  

 

                Leukocyte Esterase, Urine Auto Laboratory test result           

      Normal (applies to non-

numeric results)                        [facility]  

 

           Blood, Urine Blood Laboratory test result            Above high kinga

l            [facility] ([facility])           

 

           RBC, Urine Auto 7 /HPF     0-3        Above high normal            [facility] ([facility])                       

 

           WBC, Urine Auto 2 /HPF     0-3        Normal (applies to non-numeric 

results)            [facility] 

([facility])   

 

                Bacteria, Urine Auto Laboratory test result                 Norm

al (applies to non-numeric 

results)                                [facility]  

 

                Squamous Epithelial Cell Ur AU 3 /HPF          0-6             N

ormal (applies to non-numeric 

results)                                [facility]  

 

           Mucus, Urine Laboratory test result            Normal (applies to non

-numeric results)            

[facility] ([facility])  

 

           Hyaline Cast, Urine Auto 0 /LPF     0-1        Normal (applies to non

-numeric results)            

[facility]  









                    ID                  Date                Data Source

 

                    [id number]            10/19/2021 10:54:00 AM EDT [facility] ([facility])









          Name      Value     Range     Interpretation Code Description Data Kamla

rce(s) Supporting 

Document(s)

 

                          C reactive protein [Mass/volume] in Serum or Plasma by

 High sensitivity method 

3.08 mg/dL      0.00-0.30       Above high normal                 [facility] ([facility])                                   









                    ID                  Date                Data Source

 

                    [id number]            10/19/2021 10:54:00 AM EDT [facility]









          Name      Value     Range     Interpretation Code Description Data Kamla

rce(s) Supporting 

Document(s)

 

           White Blood Count 4.5 10     4.0-10.0   Normal (applies to non-numeri

c results)            

[facility] ([facility])  

 

           Red Blood Count 4.05 10    4.00-5.40  Normal (applies to non-numeric 

results)            

[facility] ([facility])  

 

           Hemoglobin 12.7 g/dL  12.0-15.5  Normal (applies to non-numeric resul

ts)            [facility] 

([facility])   

 

                Mean Corpuscular Volume 94.1 fl         80.0-96.0       Normal (

applies to non-numeric 

results)                                [facility] ([facility])  

 

           Hematocrit 38.1 %     36.0-47.0  Normal (applies to non-numeric resul

ts)            [facility] 

([facility])   

 

                Mean Corpuscular Hemoglobin 31.4 pg         27.0-33.0       Norm

al (applies to non-numeric 

results)                                [facility]  

 

                Mean Corpuscular HGB Conc 33.3 g/dL       32.0-36.5       Normal

 (applies to non-numeric 

results)                                [facility] ([facility])  

 

           Platelet Count, Automated 147 10     150-450    Below low normal     

       [facility] ([facility])           

 

           Neutrophils % 60.3 %     36.0-66.0  Normal (applies to non-numeric re

sults)            [facility] 

([facility])   

 

                Red Cell Distribution Width 13.0 %          11.5-14.5       Norm

al (applies to non-numeric 

results)                                [facility] ([facility])  

 

           Lymph %    24.1 %     24.0-44.0  Normal (applies to non-numeric resul

ts)            [facility] 

([facility])   

 

           Mono %     9.5 %      2.0-8.0    Above high normal            [facility] 

([facility])                                   

 

           Eos %      5.3 %      0.0-3.0    Above high normal            [facility] 

([facility])                                    

 

           Baso %     0.4 %      0.0-1.0    Normal (applies to non-numeric resul

ts)            [facility] ([facility])           

 

           Nucleated Red Blood Cell % 0.0 %      0-0        Normal (applies to n

on-numeric results)            

[facility] ([facility])  

 

           Immature Granulocyte % 0.4 %      0-3.0      Normal (applies to non-n

umeric results)            

[facility] ([facility])  

 

           Neutrophils # 2.7 10     1.5-8.5    Normal (applies to non-numeric re

sults)            [facility] 

([facility])   

 

           Lymph #    1.1 10     1.5-5.0    Below low normal            [facility] (

[facility])                                

 

           Eos #      0.2 10     0.0-0.5    Normal (applies to non-numeric resul

ts)            [facility] ([facility])           

 

           Mono #     0.4 10     0.0-0.8    Normal (applies to non-numeric resul

ts)            [facility] ([facility])           

 

           Baso #     0.0 10     0.0-0.2    Normal (applies to non-numeric resul

ts)            [facility] ([facility])           









                    ID                  Date                Data Source

 

                    [id number]            10/19/2021 10:54:00 AM EDT [facility] ([facility]

[facility])









          Name      Value     Range     Interpretation Code Description Data Kamla

rce(s) Supporting 

Document(s)

 

           Glucose, Fasting 79 mg/dL        Normal (applies to non-numeric

 results)            

[facility]  

 

             Creatinine For GFR 0.90 mg/dL   0.55-1.30    Normal (applies to non

-numeric results) 

                          [facility] ([facility])  

 

           Blood Urea Nitrogen 18 mg/dL   7-18       Normal (applies to non-nume

vlad results)            

[facility] ([facility])  

 

                Glomerular Filtration Rate Laboratory test result               

  Normal (applies to non-

numeric results)                        [facility] ([facility])  

 

                                        <content>Units are mL/min/1.73 

m2</content><br/><content></content><br/><content>Chronic Kidney Disease Staging
 per NKF:</content><br/><content></content><br/><content>Stage I & II   GFR >=60
       Normal to Mildly Decreased</content><br/><content>Stage III      GFR 30-
59      Moderately Decreased</content><br/><content>Stage IV       GFR 15-29    
  Severely Decreased</content><br/><content>Stage V        GFR <15        Very 
Little GFR Left</content><br/><content>ESRD           GFR <15 on 
RRT</content><br/><content></content> 

 

           Sodium Level 139 meq/L  136-145    Normal (applies to non-numeric res

ults)            [facility] 

([facility])   

 

           Potassium Serum 4.3 meq/L  3.5-5.1    Normal (applies to non-numeric 

results)            

[facility] ([facility])  

 

           Chloride Level 108 meq/L       Above high normal            [facility]

[facility] ([facility])                       

 

           Carbon Dioxide Level 27 meq/L   21-32      Normal (applies to non-num

nehemiah results)            

[facility] ([facility])  

 

           Anion Gap  4 meq/L    8-16       Below low normal            [facility] (

[facility])                                

 

           Calcium Level 9.0 mg/dL  8.8-10.2   Normal (applies to non-numeric re

sults)            

[facility] ([facility])  

 

           Ast/Sgot   18 U/L     7-37       Normal (applies to non-numeric resul

ts)            [facility] ([facility])           

 

           Bilirubin,Total 0.3 mg/dL  0.2-1.0    Normal (applies to non-numeric 

results)            

[facility] ([facility])  

 

           Alt/SGPT   22 U/L     12-78      Normal (applies to non-numeric resul

ts)            [facility] ([facility])           

 

           Alkaline Phosphatase 85 U/L          Normal (applies to non-num

nehemiah results)            

[facility] ([facility])  

 

           Total Protein 6.0 GM/DL  6.4-8.2    Below low normal            [facility]

T ([facility])                       

 

           Albumin    3.3 GM/DL  3.2-5.2    Normal (applies to non-numeric resul

ts)            [facility] 

([facility])   

 

           Albumin/Globulin Ratio 1.2        1.2-2.2    Normal (applies to non-n

umeric results)            

[facility] ([facility])  









                    ID                  Date                Data Source

 

                    [id number]            10/18/2021 03:35:00 PM EDT [facility] ([facility])









          Name      Value     Range     Interpretation Code Description Data Kamla

rce(s) Supporting 

Document(s)

 

           Albumin [Mass/volume] in Serum or Plasma Laboratory test result      

                            [facility] 

([facility])   

 

           Calcium [Mass/volume] in Serum or Plasma Laboratory test result      

                            [facility] 

([facility])   

 

                          Alanine aminotransferase [Enzymatic activity/volume] i

n Serum or Plasma 

Laboratory test result                                        [facility] ([facility])  

 

                    Carbon dioxide, total [Moles/volume] in Serum or Plasma Labo

ratory test result  

                                                [facility] ([facility])  

 

           Creatinine [Mass/volume] in Serum or Plasma Laboratory test result   

                               [facility] 

([facility])   

 

           Chloride [Moles/volume] in Serum or Plasma Laboratory test result    

                              [facility] 

([facility])   

 

           Glucose [Mass/volume] in Serum or Plasma Laboratory test result      

                            [facility] 

([facility])   

 

                          Alkaline phosphatase [Enzymatic activity/volume] in Se

rum or Plasma Laboratory 

test result                                         [facility] ([facility])  

 

           Potassium [Moles/volume] in Serum or Plasma Laboratory test result   

                               [facility] 

([facility])   

 

           Sodium [Moles/volume] in Serum or Plasma Laboratory test result      

                            [facility] 

([facility])   

 

           Protein [Mass/volume] in Serum or Plasma Laboratory test result      

                            [facility] 

([facility])   

 

           Urea nitrogen [Mass/volume] in Serum or Plasma Laboratory test result

                                  

[facility] ([facility])  

 

                          Aspartate aminotransferase [Enzymatic activity/volume]

 in Serum or Plasma 

Laboratory test result                                        [facility] ([facility])  









                    ID                  Date                Data Source

 

                    [id number]            10/18/2021 03:35:00 PM EDT [facility] ([facility])









          Name      Value     Range     Interpretation Code Description Data Kamla

rce(s) Supporting 

Document(s)

 

           Bacteria identified in Urine by Culture Laboratory test result       

                           [facility] 

([facility])   









                    ID                  Date                Data Source

 

                    [id number]            10/18/2021 03:35:00 PM EDT [facility] ([facility])









          Name      Value     Range     Interpretation Code Description Data Kamla

rce(s) Supporting 

Document(s)

 

           Specific gravity of Urine Laboratory test result                     

             [facility] ([facility])                    

 

           pH of Urine by Test strip Laboratory test result                     

             [facility] ([facility])                    

 

           Color of Urine Laboratory test result                                

  [facility] ([facility])                                

 

           Appearance of Urine Laboratory test result                           

       [facility] ([facility])                       

 

                          Leukocytes [#/area] in Urine sediment by Microscopy hi

gh power field Laboratory 

test result                                         [facility] ([facility])  

 

           Protein [Presence] in Urine by Test strip Laboratory test result     

                             [facility] 

([facility])   

 

           Ketones [Presence] in Urine by Test strip Laboratory test result     

                             [facility] 

([facility])   

 

           Glucose [Presence] in Urine Laboratory test result                   

               [facility] ([facility])                   

 

           Bilirubin.total [Presence] in Urine by Test strip Laboratory test res

ult                                  

[facility] ([facility])  

 

           Urobilinogen [Mass/volume] in Urine by Test strip Laboratory test res

ult                                  

[facility] ([facility])  

 

           Nitrite [Presence] in Urine by Test strip Laboratory test result     

                             [facility] 

([facility])   

 

           Ua Occult Blood Laboratory test result                               

   [facility] ([facility])                               









                    ID                  Date                Data Source

 

                    [id number]            10/18/2021 03:35:00 PM EDT [facility] ([facility])









          Name      Value     Range     Interpretation Code Description Data Kamla

rce(s) Supporting 

Document(s)

 

                          C reactive protein [Mass/volume] in Serum or Plasma by

 High sensitivity method 

Laboratory test result                                        [facility] ([facility])  









                    ID                  Date                Data Source

 

                    [id number]            10/18/2021 03:35:00 PM EDT [facility] ([facility])









          Name      Value     Range     Interpretation Code Description Data Kamla

rce(s) Supporting 

Document(s)

 

           Leukocytes [#/volume] in Blood by Automated count Laboratory test res

ult                                  

[facility] ([facility])  

 

           Hemoglobin [Mass/volume] in Blood Laboratory test result             

                     [facility] ([facility])           

 

             Erythrocytes [#/volume] in Blood by Automated count Laboratory test

 result                            

                          [facility] ([facility])  

 

                          Erythrocyte mean corpuscular volume [Entitic volume] b

y Automated count 

Laboratory test result                                        [facility] ([facility])  

 

                    Hematocrit [Volume Fraction] of Blood by Automated count Lab

oratory test result 

                                                [facility] ([facility])  

 

                          Erythrocyte mean corpuscular hemoglobin [Entitic mass]

 by Automated count 

Laboratory test result                                        [facility] ([facility])  

 

                                        Erythrocyte mean corpuscular hemoglobin 

concentration [Mass/volume] by Automated

 count       Laboratory test result                                        [facility]

T ([facility])                                    

 

           Platelets [#/volume] in Blood by Automated count Laboratory test resu

lt                                  

[facility] ([facility])  

 

                          Erythrocyte distribution width [Ratio] by Automated co

unt Laboratory test result

                                                    [facility] ([facility])  

 

                          Platelet mean volume [Entitic volume] in Blood by [facility] Mcfadden Laboratory test 

result                                              [facility] ([facility])  

 

                          Band form neutrophils/100 leukocytes in Body fluid by 

Manual count Laboratory 

test result                                         [facility] ([facility])  

 

           Neutrophils Laboratory test result                                  M

[facility] ([facility])                                    

 

           Monocytes  Laboratory test result                                  ME

[facility] ([facility])                                    

 

                    Eosinophils/100 leukocytes in Body fluid by Manual count Lab

oratory test result 

                                                [facility] ([facility])  

 

                    Lymphocytes/100 leukocytes in Body fluid by Manual count Lab

oratory test result 

                                                [facility] ([facility])  

 

           Basophils [#/volume] in Blood by Automated count Laboratory test resu

lt                                  

[facility] ([facility])  

 

           Basophils/100 leukocytes in Blood Laboratory test result             

                     [facility] ([facility])           

 

           Lymphocytes [#/volume] in Blood Laboratory test result               

                   [facility] ([facility])           

 

           Eosinophils [#/volume] in Blood by Automated count Laboratory test re

sult                                  

[facility] ([facility])  

 

           Neutrophils [#/volume] in Blood by Automated count Laboratory [facility] re

sult                                  

[facility]  

 

           Monocytes [#/volume] in Blood Laboratory test result                 

                 [facility] ([facility])           









                    ID                  Date                Data Source

 

                    [id number]             2020 11:08:00 AM EST [facility]









          Name      Value     Range     Interpretation Code Description Data Kamla

rce(s) Supporting 

Document(s)

 

                Thyroxine (T4) [Mass/volume] in Serum or Plasma 7.5 ug/dL       

4.5-12.0        Normal 

(applies to non-numeric results)                           [facility]

                                         

 

                Thyrotropin [Units/volume] in Serum or Plasma 2.460 uIU/ML    0.

358-3.740     Normal 

(applies to non-numeric results)                           [facility] ([facility])

                                         

 

                Calcidiol [Mass/volume] in Serum or Plasma 60.9 ng/mL      30.0-

100.0      Normal (applies

 to non-numeric results)                     [facility]  









                    ID                  Date                Data Source

 

                    [id number]             2020 11:08:00 AM EST [facility] ([facility])









          Name      Value     Range     Interpretation Code Description Data Kamla

rce(s) Supporting 

Document(s)

 

           Triglycerides Level 133 mg/dL             Normal (applies to non-nume

vlad results)            [facility]

 ([facility])  

 

           Cholesterol Level 165 mg/dL             Normal (applies to non-numeri

c results)            [facility] 

([facility])   

 

           HDL Cholesterol 59 mg/dL              Normal (applies to non-numeric 

results)            [facility] 

([facility])   

 

           LDL Cholesterol 79 mg/dL              Normal (applies to non-numeric 

results)            [facility] 

([facility])   

 

           Non-HDL-C  106 mg/dL             Normal (applies to non-numeric resul

ts)            [facility] ([facility])           

 

           Cholesterol Risk Ratio 2.796                 Normal (applies to non-n

umeric results)            [facility]

 ([facility])  









                    ID                  Date                Data Source

 

                    [id number]             2020 11:08:00 AM EST [facility] ([facility])









          Name      Value     Range     Interpretation Code Description Data Kamla

rce(s) Supporting 

Document(s)

 

           Glucose, Fasting 92 mg/dL        Normal (applies to non-numeric

 results)            

[facility] ([facility])  

 

                Glomerular Filtration Rate Laboratory test result               

  Normal (applies to non-

numeric results)                        [facility] ([facility])  

 

                                        <content>Units are mL/min/1.73 

m2</content><br/><content></content><br/><content>Chronic Kidney Disease Staging
 per NKF:</content><br/><content></content><br/><content>Stage I & II   GFR >=60
       Normal to Mildly Decreased</content><br/><content>Stage III      GFR 30-
59      Moderately Decreased</content><br/><content>Stage IV       GFR 15-29    
  Severely Decreased</content><br/><content>Stage V        GFR <15        Very 
Little GFR Left</content><br/><content>ESRD           GFR <15 on 
RRT</content><br/><content></content> 

 

             Creatinine For GFR 0.74 mg/dL   0.55-1.30    Normal (applies to non

-numeric results) 

                          [facility] ([facility])  

 

           Blood Urea Nitrogen 16 mg/dL   7-18       Normal (applies to non-nume

vlad results)            

[facility] ([facility])  

 

           Sodium Level 144 meq/L  136-145    Normal (applies to non-numeric res

ults)            [facility] 

([facility])   

 

           Potassium Serum 4.4 meq/L  3.5-5.1    Normal (applies to non-numeric 

results)            

[facility]  

 

           Carbon Dioxide Level 31 meq/L   21-32      Normal (applies to non-num

nehemiah results)            

[facility] ([facility])  

 

           Chloride Level 107 meq/L       Normal (applies to non-numeric r

esults)            [facility]

 ([facility])  

 

           Calcium Level 8.7 mg/dL  8.8-10.2   Below low normal            [facility]

T ([facility])                       

 

           Anion Gap  6 meq/L    8-16       Below low normal            [facility] (

[facility])                                

 

           Ast/Sgot   20 U/L     7-37       Normal (applies to non-numeric resul

ts)            [facility] ([facility])           

 

           Alt/SGPT   25 U/L     12-78      Normal (applies to non-numeric resul

ts)            [facility] ([facility])           

 

           Bilirubin,Total 0.7 mg/dL  0.2-1.0    Normal (applies to non-numeric 

results)            

[facility] ([facility])  

 

           Total Protein 6.0 GM/DL  6.4-8.2    Below low normal            [facility]

T ([facility])                       

 

           Alkaline Phosphatase 89 U/L          Normal (applies to non-num

nehemiah results)            

[facility] ([facility])  

 

           Albumin    3.6 GM/DL  3.2-5.2    Normal (applies to non-numeric resul

ts)            [facility] 

([facility])   

 

           Albumin/Globulin Ratio 1.5        1.2-2.2    Normal (applies to non-n

umeric results)            

[facility]  









                    ID                  Date                Data Source

 

                    [id number]             2020 11:08:00 AM EST [facility] ([facility])









          Name      Value     Range     Interpretation Code Description Data Kamla

rce(s) Supporting 

Document(s)

 

           White Blood Count 6.2 10     4.0-10.0   Normal (applies to non-numeri

c results)            

[facility] ([facility])  

 

           Hemoglobin 13.6 g/dL  12.0-15.5  Normal (applies to non-numeric resul

ts)            [facility] 

([facility])   

 

           Red Blood Count 4.42 10    4.00-5.40  Normal (applies to non-numeric 

results)            

[facility]  

 

           Mean Corpuscular Volume 96.2 fl    80.0-96.0  Above high normal      

      [facility]           

 

           Hematocrit 42.5 %     36.0-47.0  Normal (applies to non-numeric resul

ts)            [facility] 

([facility])   

 

                Mean Corpuscular Hemoglobin 30.8 pg         27.0-33.0       Norm

al (applies to non-numeric 

results)                                [facility] ([facility])  

 

                Mean Corpuscular HGB Conc 32.0 g/dL       32.0-36.5       Normal

 (applies to non-numeric 

results)                                [facility] ([facility])  

 

                Red Cell Distribution Width 13.2 %          11.5-14.5       Norm

al (applies to non-numeric 

results)                                [facility] ([facility])  

 

                Platelet Count, Automated 153 10          150-450         Normal

 (applies to non-numeric results)

                                        [facility] ([facility])  

 

           Neutrophils % 56.7 %     36.0-66.0  Normal (applies to non-numeric re

sults)            [facility] 

([facility])   

 

           Lymph %    36.2 %     24.0-44.0  Normal (applies to non-numeric resul

ts)            [facility] 

([facility])   

 

           Eos %      1.4 %      0.0-3.0    Normal (applies to non-numeric resul

ts)            [facility] ([facility])           

 

           Mono %     5.1 %      0.0-5.0    Above high normal            [facility] 

([facility])                                   

 

           Immature Granulocyte % 0.3 %      0-3.0      Normal (applies to non-n

umeric results)            

[facility] ([facility])  

 

           Baso %     0.3 %      0.0-1.0    Normal (applies to non-numeric resul

ts)            [facility] ([facility])           

 

           Nucleated Red Blood Cell % 0.0 %      0-0        Normal (applies to n

on-numeric results)            

[facility] ([facility])  

 

           Neutrophils # 3.5 10     1.5-8.5    Normal (applies to non-numeric re

sults)            [facility] 

([facility])   

 

           Mono #     0.3 10     0.0-0.8    Normal (applies to non-numeric resul

ts)            [facility] ([facility])           

 

           Lymph #    2.3 10     1.5-5.0    Normal (applies to non-numeric resul

ts)            [facility] ([facility])           

 

           Eos #      0.1 10     0.0-0.5    Normal (applies to non-numeric resul

ts)            [facility] ([facility])           

 

           Baso #     0.0 10     0.0-0.2    Normal (applies to non-numeric resul

ts)            [facility] ([facility])           









                    ID                  Date                Data Source

 

                    [id number]          2020 12:00:00 AM EST [facility]

 

                                        

________________________________________________________________________________

Saúl Tejada MD               Patient Name: BRYCE OLIVERA[address] [facility]       
         YOB: 1935[place]  [address]              Date of 
Exam: 2020#: [phone]: 
[phone]______________________________________________________________________

__________EXAM: MRI LUMBAR SPINE WITHOUT CONTRASTPROCEDURE INFORMATION:Exam: MR 
Lumbar Spine Without Contrast.Exam date and time: 2020 12:24 PM Age: 85 
years oldClinical indication: Low back painTECHNIQUE: Imaging protocol: 
Multiplanar magnetic resonance images of thelumbar spine without intravenous 
contrast.COMPARISON: LUMBOSACRAL SPINE COMPLETE WITH BENDING XRAY 2020 
11:51 AMFINDINGS:Vertebrae: There is a mild levoscoliosis. There is mild 
degenerativeanterolisthesis of L2 on L3. Otherwise,The lumbar vertebral bodies 
arenormal in height,signal intensity and alignment.No acute fracture 
ordislocation is seen.Spinal epidural space: There is no evidence of epidural 
masses orhemorrhage.Spinal cord: The conus medullaris is normal. The cauda 
equina nerve rootsdemonstrate no crowding or displacement.L1-L2: Mildly reduced 
in height and T2 signal indicating degeneration.Thereis a mild diffuse posterior
 bulge causing mild effacement of the thecalsac.The facet joints demonstrate 
mild degenerative hypertrophy andsclerosis.The spinal canal and neural foramina 
are patent and withoutsignificant stenosis.L2-L3: Markedly reduced in height and
 T2 signal indicating degeneration.Moderate adjacent degenerative endplate 
changes. Small diffuse posteriorherniation. Moderate facet arthropathy.There is 
thickening of theligamentum flavum.There is mild spinal canal narrowing, with an
 AP canaldimension of 10 mm.There is moderate bilateral foraminal stenosis.L3-
L4: Moderately reduced in height and T2 signal indicating degeneration.Small 
diffuse posterior herniation. Mild facet arthropathy.There isthickening of the 
ligamentum flavum.There is mild spinal canal narrowing,with an AP canal 
dimension of 10 mm.There is mild bilateral foraminalstenosis. L4-L5: Moderately 
reduced in height and T2 signal indicatingdegeneration. Moderate adjacent 
degenerative endplate changes. Smalldiffuse posterior herniation. Moderate facet
 arthropathy. There is noevidence of spinal canal narrowing. There is mild 
bilateral foraminalstenosis.L5-S1: Disc fusion is noted. There is diffuse 
endplate spurring.The facetjoints demonstrate mild degenerative hypertrophy and 
sclerosis.There is noevidence of spinal canal narrowing.There is mild right 
foraminalstenosis.There is moderate left foraminal stenosis.Soft tissues: The 
prevertebral soft tissues appear normal.IMPRESSION:1. MRI of the lumbar spine 
reveals multilevel degenerative spondyliticchanges and degenerative disc disease
 as described above.2. There is a mild levoscoliosis. There is mild 
degenerativeanterolisthesis of L2 on L3. Otherwise,The lumbar vertebral bodies 
arenormal in height,signal intensity and alignment.No acute fracture 
ordislocation is seen.Thank you for allowing us to participate in the care of 
your patient.Dictated and Authenticated by: Darryl Richey MD 2020 3:42 
PMEastern Time ([place] & [place])VradV/louiecTannita you for referring AMY OLIVERA to our
 office.   Electronically Signed - [facility]  20 15:47   









          Name      Value     Range     Interpretation Code Description Data Kamla

rce(s) Supporting 

Document(s)

 

                                                                       







                                        Procedure

 

                                          



                                                                                
                                                                                
                                                                                
                                                          



Social History

          



           Code       Duration   Value      Status     Description Data Source(s

)

 

           Smoking    10/16/2021 12:00:00 AM EDT Former Smoker completed  Former

 Smoker [id number] 

(Formerly [facility])

 

             Smoking      2021 02:16:15 PM EDT Never smoked tobacco (findi

xander) completed    

Never smoked tobacco (finding)          LENNY (Jax Perez MD [facility])

 

             Smoking      2021 02:14:04 PM EDT Never smoked tobacco (findi

ng) completed    

Never smoked tobacco (finding)          LENNY (Jax Perez MD [facility])

 

             Smoking      2021 02:12:59 PM EDT Never smoked tobacco (findi

ng) completed    

Never smoked tobacco (finding)          LENNY (Jax Perez MD [facility])

 

             Smoking      2021 07:45:43 AM EDT Never smoked tobacco (findi

ng) completed    

Never smoked tobacco (finding)          LENNY (Jax Perez MD [facility])

 

             Smoking      2021 12:00:00 AM EST Patient has never smoked co

mpleted    Patient 

has never smoked                        [facility] ([facility])



                                                                                
                                                                    



Vital Signs

          



                    ID                  Date                Data Source

 

                    UNK                                      









           Name       Value      Range      Interpretation Code Description Data

 Source(s)

 

           Body weight 134.00 [lb_av]                       134.00 [lb_av] [facility]

T ([facility])

 

           Body mass index (BMI) [Ratio] 24.7 kg/m2                       24.7 k

g/m2 [facility] ([facility])

 

           [facility] 81 /min                          81 /min    [facility]

 

           Respiratory rate 18 /min                          18 /min    [facility] (

[facility])

 

           Systolic blood pressure 124 mm[Hg]                       124 mm[Hg] M

[facility] ([facility])

 

           Diastolic blood pressure 72 mm[Hg]                        72 mm[Hg]  

[facility] ([facility])

 

           Body height 61.7 [in_i]                       61.7 [in_i] [facility] ([facility])

 

                                        5'1.70" 

 

           Body temperature 98.8 [degF]                       98.8 [degF] [facility]

 ([facility])

 

           Oxygen saturation in Arterial blood by Pulse oximetry 100 %          

                  100 %      [facility] 

([facility])

 

           Ideal body weight 105 [lb_av]                       105 [lb_av] [facility]

T ([facility])

 

           Systolic blood pressure 92 mm[Hg]                        92 mm[Hg]  M

[facility] ([facility])

 

           Diastolic blood pressure 58 mm[Hg]                        58 mm[Hg]  

[facility] ([facility])

 

           Body height 61.7 [in_i]                       61.7 [in_i] [facility] ([facility])

 

                                        5'1.70" 

 

           Heart rate 80 /min                          80 /min    [facility] ([facility])

 

           Respiratory rate 12 /min                          12 /min    [facility] (

[facility])

 

           Body temperature 98.5 [degF]                       98.5 [degF] [facility]

 ([facility])

 

           Oxygen saturation in Arterial blood by Pulse oximetry 95 %           

                  95 %       [facility] 

([facility])

 

           Ideal body weight 105 [lb_av]                       105 [lb_av] [facility]

T ([facility])

 

           Oxygen saturation in Arterial blood by Pulse oximetry 95 %           

                  95 %       [facility] 

([facility])

 

           Body weight 144.25 [lb_av]                       144.25 [lb_av] [facility]

T ([facility])

 

           Body mass index (BMI) [Ratio] 26.6 kg/m2                       26.6 k

g/m2 [facility] ([facility])

 

           Heart rate 74 /min                          74 /min    [facility] ([facility])

 

           Systolic blood pressure 124 mm[Hg]                       124 mm[Hg] 

[facility] ([facility])

 

           Diastolic blood pressure 74 mm[Hg]                        74 mm[Hg]  

[facility] ([facility])

 

           Body height 61.7 [in_i]                       61.7 [in_i] [facility] ([facility])

 

                                        " 

 

           Respiratory rate 18 /min                          18 /min    [facility] (

[facility])

 

           Body temperature 97.8 [degF]                       97.8 [degF] [facility]

 ([facility])

 

           Ideal body weight 105 [lb_av]                       105 [lb_av] [facility]

T ([facility])

 

           Oxygen saturation in Arterial blood by Pulse oximetry 98 %           

                  98 %       [facility] 

([facility])

 

           Body height 61.7 [in_i]                       61.7 [in_i] [facility] ([facility])

 

                                        " 

 

           Body weight 144.00 [lb_av]                       144.00 [lb_av] [facility]

T ([facility])

 

           Ideal body weight 105 [lb_av]                       105 [lb_av] [facility]

T ([facility])

 

           Systolic blood pressure 126 mm[Hg]                       126 mm[Hg] M

[facility] ([facility])

 

           Diastolic blood pressure 72 mm[Hg]                        72 mm[Hg]  

[facility] ([facility])

 

           Body mass index (BMI) [Ratio] 26.6 kg/m2                       26.6 k

g/m2 [facility] ([facility])

 

           Heart rate 77 /min                          77 /min    [facility] ([facility])

 

           Respiratory rate 18 /min                          18 /min    [facility] (

[facility])

 

           Body temperature 97.1 [degF]                       97.1 [degF] [facility]

 ([facility])

 

           Body weight 142.00 [lb_av]                       142.00 [lb_av] [facility]

T ([facility])

 

           Systolic blood pressure 142 mm[Hg]                       142 mm[Hg] M

[facility] ([facility])

 

           Heart rate 74 /min                          74 /min    [facility] ([facility])

 

           Respiratory rate 78 /min                          78 /min    [facility] (

[facility])

 

           Body temperature 98.3 [degF]                       98.3 [degF] [facility]

 ([facility])

 

           Diastolic blood pressure 82 mm[Hg]                        82 mm[Hg]  

[facility] ([facility])

 

           Body height 61.7 [in_i]                       61.7 [in_i] [facility] ([facility])

 

                                        5'.70" 

 

           Body mass index (BMI) [Ratio] 26.2 kg/m2                       26.2 k

g/m2 [facility]

 

           Oxygen saturation in Arterial blood by Pulse oximetry 97 %           

                  97 %       [facility] 

([facility])

 

           Ideal body weight 105 [lb_av]                       105 [lb_av] [facility]

T ([facility])

 

           Systolic blood pressure 126 mm[Hg]                       126 mm[Hg] M

[facility] ([facility])

 

           Diastolic blood pressure 68 mm[Hg]                        68 mm[Hg]  

[facility] ([facility])

 

           Body height 61.7 [in_i]                       61.7 [in_i] [facility] ([facility])

 

                                        5'1.70" 

 

           Body weight 140.12 [lb_av]                       140.12 [lb_av] [facility]

T ([facility])

 

           Body mass index (BMI) [Ratio] 25.9 kg/m2                       25.9 k

g/m2 [facility] ([facility])

 

           Heart rate 72 /min                          72 /min    [facility] ([facility])

 

           Respiratory rate 18 /min                          18 /min    [facility] (

[facility])

 

           Body temperature 98.2 [degF]                       98.2 [degF] [facility]

 ([facility])

 

           Oxygen saturation in Arterial blood by Pulse oximetry 99 %           

                  99 %       [facility] 

([facility])

 

           Ideal body weight 105 [lb_av]                       105 [lb_av] [facility]

T ([facility])

 

           Oxygen saturation in Arterial blood by Pulse oximetry 97 %           

                  97 %       [facility] 

([facility])

 

           Systolic blood pressure 124 mm[Hg]                       124 mm[Hg] M

[facility] ([facility])

 

           Diastolic blood pressure 72 mm[Hg]                        72 mm[Hg]  

[facility] ([facility])

 

           Body height 61.7 [in_i]                       61.7 [in_i] [facility] ([facility])

 

                                        5'1.70" 

 

           Body weight 139.12 [lb_av]                       139.12 [lb_av] [facility]

T ([facility])

 

           Body mass index (BMI) [Ratio] 25.7 kg/m2                       25.7 k

g/m2 [facility] ([facility])

 

           Heart rate 5 /min                           5 /min     [facility] ([facility])

 

           Respiratory rate 18 /min                          18 /min    [facility] (

[facility])

 

           Body temperature 98.6 [degF]                       98.6 [degF] [facility]

 ([facility])

 

           Ideal body weight 105 [lb_av]                       105 [lb_av] [facility]

T ([facility])

 

           Ideal body weight 105 [lb_av]                       105 [lb_av] [facility]

T ([facility])

 

           Oxygen saturation in Arterial blood by Pulse oximetry 97 %           

                  97 %       [facility] 

([facility])

 

           Systolic blood pressure 124 mm[Hg]                       124 mm[Hg] M

[facility] ([facility])

 

           Diastolic blood pressure 74 mm[Hg]                        74 mm[Hg]  

[facility] ([facility])

 

           Body height 61.7 [in_i]                       61.7 [in_i] [facility] ([facility])

 

                                        5'1.70" 

 

           Body weight 141.00 [lb_av]                       141.00 [lb_av] [facility]

T ([facility])

 

           Body mass index (BMI) [Ratio] 26.0 kg/m2                       26.0 k

g/m2 [facility] ([facility])

 

           Heart rate 79 /min                          79 /min    [facility] ([facility])

 

           [facility]piratory rate 18 /min                          18 /min    [facility]

 

           Body temperature 98.3 [degF]                       98.3 [facility]degF] [facility]



                                                                                
                  



Patient Treatment Plan of Care

          



             Planned Activity Planned Date Details      Description  Data Source

(s)

 

                          besifloxacin 6 MG/ML Ophthalmic Suspension [Besivance]

 09/10/2020 12:00:00 AM 

VIANCA GALVEZ (Jax Perez MD [facility])

## 2021-11-27 NOTE — REP
INDICATION:

trauma  Nontraumatic hip pain.



COMPARISON:

None.



TECHNIQUE:

Frontal view of the pelvis with neutral and cross-table lateral views of the right hip.



FINDINGS:

Comminuted displaced intertrochanteric fracture of the right femur.



Remainder of the examination demonstrates age-related changes.  No further acute

fracture or dislocation appreciated.



IMPRESSION:

Acute comminuted displaced angulated intertrochanteric fracture proximal right femur.





<Electronically signed by Nitin Velazquez > 11/27/21 0951

## 2021-11-28 VITALS — DIASTOLIC BLOOD PRESSURE: 70 MMHG | SYSTOLIC BLOOD PRESSURE: 113 MMHG

## 2021-11-28 VITALS — DIASTOLIC BLOOD PRESSURE: 61 MMHG | SYSTOLIC BLOOD PRESSURE: 103 MMHG

## 2021-11-28 VITALS — SYSTOLIC BLOOD PRESSURE: 126 MMHG | DIASTOLIC BLOOD PRESSURE: 81 MMHG

## 2021-11-28 LAB
BUN SERPL-MCNC: 18 MG/DL (ref 7–18)
CALCIUM SERPL-MCNC: 8.1 MG/DL (ref 8.8–10.2)
CHLORIDE SERPL-SCNC: 109 MEQ/L (ref 98–107)
CO2 SERPL-SCNC: 24 MEQ/L (ref 21–32)
CREAT SERPL-MCNC: 0.62 MG/DL (ref 0.55–1.3)
GFR SERPL CREATININE-BSD FRML MDRD: > 60 ML/MIN/{1.73_M2} (ref 32–?)
GLUCOSE SERPL-MCNC: 114 MG/DL (ref 70–100)
HCT VFR BLD AUTO: 30.1 % (ref 36–47)
HGB BLD-MCNC: 9.9 G/DL (ref 12–15.5)
MAGNESIUM SERPL-MCNC: 1.8 MG/DL (ref 1.8–2.4)
MCH RBC QN AUTO: 31.8 PG (ref 27–33)
MCHC RBC AUTO-ENTMCNC: 32.9 G/DL (ref 32–36.5)
MCV RBC AUTO: 96.8 FL (ref 80–96)
PHOSPHATE SERPL-MCNC: 2.9 MG/DL (ref 2.5–4.9)
PLATELET # BLD AUTO: 133 10^3/UL (ref 150–450)
POTASSIUM SERPL-SCNC: 3.8 MEQ/L (ref 3.5–5.1)
RBC # BLD AUTO: 3.11 10^6/UL (ref 4–5.4)
SODIUM SERPL-SCNC: 141 MEQ/L (ref 136–145)
WBC # BLD AUTO: 6.6 10^3/UL (ref 4–10)

## 2021-11-28 RX ADMIN — ACETAMINOPHEN PRN MG: 325 TABLET ORAL at 19:43

## 2021-11-28 RX ADMIN — MEMANTINE SCH MG: 5 TABLET ORAL at 19:42

## 2021-11-28 RX ADMIN — CYANOCOBALAMIN TAB 500 MCG SCH MCG: 500 TAB at 19:42

## 2021-11-28 RX ADMIN — ACETAMINOPHEN PRN MG: 325 TABLET ORAL at 17:51

## 2021-11-28 RX ADMIN — ATORVASTATIN CALCIUM SCH MG: 10 TABLET, FILM COATED ORAL at 19:42

## 2021-11-28 RX ADMIN — MEMANTINE SCH MG: 5 TABLET ORAL at 08:41

## 2021-11-28 RX ADMIN — RIVAROXABAN SCH MG: 10 TABLET, FILM COATED ORAL at 17:54

## 2021-11-28 RX ADMIN — CITALOPRAM HYDROBROMIDE SCH MG: 10 TABLET ORAL at 08:41

## 2021-11-28 NOTE — RO
OPERATIVE NOTE



DATE OF OPERATION: 11/27/2021



PREOPERATIVE DIAGNOSIS:  Right intertrochanteric hip fracture.



POSTOPERATIVE DIAGNOSIS:  Right intertrochanteric hip fracture.



PLANNED PROCEDURE: Right hip open reduction and internal fixation, long

intramedullary Synthes nail.



PROCEDURE PERFORMED: Right hip open reduction and internal fixation, long

intramedullary Synthes nail.



SURGEON: Gregory Peng MD



ASSISTANT: 



ANESTHESIOLOGIST: Dr. Masterson



ANESTHESIA: Spinal anesthetic.



COMPLICATIONS: None 



SAMPLES SENT: None 



MARKS: None



ESTIMATED BLOOD LOSS: 50 mL 



OPERATIVE PREAMBLE: This 86-year-old female sustained a ground-level fall with

an intertrochanteric hip fracture on the left side. I spoke with the 

both in the emergency department as well as in preoperative holding. She wished

to proceed. She had no further questions. We had previously discussed the pros

and cons, risks and benefits of nonsurgical very surgical management. 



OPERATIVE NOTE: The patient was brought to the operating theater. She was

administered 900 mg of IV clindamycin due to penicillin allergy. The patient

was placed supine on the operating room table, the right lower extremity in

traction, the left lower extremity attached to the center post of the bed in

scissoring position, appropriately padded. All bony prominences were

appropriately prepped and draped. Chlorhexidine based prep solution was allowed

to thoroughly dry over three minutes prior to draping. A preoperative time-out

was performed to confirm the site, patient and surgery. 



I used a shower curtain and sterile drape. I used the partially threaded 3.2 mm

guidewire. I inserted this percutaneously at the tip of the greater trochanter

on both AP and lateral radiographs. I inserted this down towards the level of

the lesser trochanter in the center of the femur on lateral radiographs. I made

a small open incision there approximately two inches long. I dissected through

skin and subcutaneous tissue, achieved meticulous hemostasis. I used the soft

tissue protector. I used the Synthes entry reamer. I then inserted a ball-tip

guidewire with the tip slightly curved. I inserted the center/center down the

center of the femoral shaft to the level of the superior aspect of the patella

on both AP and lateral radiographs. I measured this to be approximately 390 and

so I selected a 380 mm nail. I sequentially reamed up to 12.5 mm.  This

appeared to be a 125 degree neck shaft angle. Therefore, I selected a 380

length with a 125 mm neck shaft angle and 11 mm diameter. I inserted this using

the inserting device to appropriate depth. 



I then used a 125 degree drop down guide. I again inserted percutaneously

through a small one inch incision the guidewire center/center of the femoral

neck and head to the subchondral bone. I took fluoroscopy pictures doing the

near-far technique to confirm no penetration. This measured approximately 100

mm.  Therefore, I reamed to 95 mm and inserted a 95 mm helical blade after

appropriate reaming. I applied some gentle compression of the fracture site and

then locked the nail proximally. The guides were removed.



I then turned my attention distally. I used the center/center technique to

insert the two fully threaded distal locking screws in the oblong as well as

the hole proximal to this to an appropriate depth. The screws measured 38 mm

proximally and 44 mm distally. I took final AP and lateral radiographs after

taking off all the intramedullary guides and saved these onto the system.



The reduction appeared appropriate and nail appropriately placed. I thoroughly

irrigated the wounds. The subcutaneous tissues were closed with 2-0 Vicryl

sutures. The skin was closed with 3-0 Monocryl. The skin was cleaned with wet

and dry dressing followed by application of Steri-Strips and bulky soft

orthopedic dressing.  The patient was taken out of the traction setup,

transferred off of the operating room table and taken to the postanesthesia

care unit in stable condition. All sponge, needle, and instrument counts were

correct. No complications. Estimated blood loss: 50 cc.



Plan is for the patient to be weightbearing as tolerated. She will be followed

by PT and OT services. Walker as needed. Xarelto for DVT prophylaxis for 35

days. Readmission into the hospitalist service, followup in two weeks time. She

may shower on top of the incision starting at two weeks time, change the

dressing postop day one and two and as needed.

## 2021-11-28 NOTE — IPN
PROGRESS NOTE



DATE:  11/28/2021



CHIEF COMPLAINT: Postop day one, right hip open reduction and internal

fixation, long intramedullary nail. 



HISTORY OF PRESENT ILLNESS: This patient was seen today on the vale, postop day

one, 5 Almazan. No concerns from the nursing staff although I did get a question

about weightbearing status. She is weightbearing as tolerated. Apparently she

has already been offered physical therapist. Her  is not in this

morning. I saw the patient at approximately 10 a.m. Otherwise, some small

demand for narcotics overnight per the nursing staff.



PHYSICAL EXAM: This is a well appearing female. She is alert, responds

appropriately. Dressings: Very mild saturation, have not been changed. She is

able to dorsiflex, plantarflex. The foot feels warm and well perfused, good

pedal pulses. Normal sensation throughout the foot.



ASSESSMENT AND PLAN: This patient postop day one is weightbearing as tolerated.

Actively managed by the hospitalist service.  They may be discharged home once

they are cleared by PT and OT services with the appropriate outpatient followup

arranged. The patient's wounds were closed with running 3-0 Monocryl, therefore

no need to discontinue any staples and leave the Steri-Strips in place with no

showering over top for 14 days.

## 2021-11-28 NOTE — IPNPDOC
Text Note


Date of Service


The patient was seen on 11/28/21.





NOTE


Subjective:


86-year-old female present emergency room department after a fall.  She has on 

the intertrochanteric fracture of the right femur.  She underwent surgery for 

ORIF on 11/27.





Patient was seen and examined at bedside this morning.  She had just worked with

physical therapy and reported feeling pain in her right lower extremity.  

Otherwise had no new complaints.





Review of systems:


10 point review of system was negative except for what is noted in the HPI





Physical exam:


General: Lying in bed, no acute distress


Head/Neck/Throat: Trachea midline, mucous membranes moist


Eyes: Sclera anicteric, PERRLA


Thorax: Normal respiratory effort on room air, lungs clear to auscultation 

bilaterally, no wheezes/rales/rhonchi


Cardiovascular: Normal rate, regular rhythm, normal S1, S2; no S3, S4, 

rubs/gallops/murmurs


Abdomen: Bowel sounds present, soft/nontender/nondistended


Genitourinary: No CVA tenderness, no Leal in place


Musculoskeletal: Moving all extremities, no edema


Skin: Warm, dry


Neurologic: AAOx3, speech fluent and goal-directed, no focal deficits, grossly i

ntact





Labs: 


See below





Imaging: 


Please see imaging section 





Assessment/plan:


#Right proximal femur fracture 


-POD#1 ORIF.  Continue with physical therapy.  ARU screen in place.





#Acute blood loss anemia


-Secondary to surgery.  Continue monitor hemoglobin/hematocrit adequate.  Will 

transfuse if hemoglobin is less than 7.





#GERD


-Continue omeprazole





#Hyperlipidemia


-Continue statin





#Dementia


-Continue memantine





#DVT prophylaxis


-Started on Xarelto, as per orthopedic recommendations.





Disposition: Patient worked with physical therapy today and will likely need 

rehab.  Hearing screen is placed.





VS,Fishbone, I+O


VS, Fishbone, I+O


Laboratory Tests


11/28/21 05:27











Vital Signs








  Date Time  Temp Pulse Resp B/P (MAP) Pulse Ox O2 Delivery O2 Flow Rate FiO2


 


11/28/21 06:00 98.3 75 17 126/81 (96) 95 Room Air  














I&O- Last 24 Hours up to 6 AM 


 


 11/28/21





 06:00


 


Intake Total 1320 ml


 


Output Total 875 ml


 


Balance 445 ml

















LEANDER LUCIANO M.D.           Nov 28, 2021 10:39

## 2021-11-29 VITALS — DIASTOLIC BLOOD PRESSURE: 56 MMHG | SYSTOLIC BLOOD PRESSURE: 110 MMHG

## 2021-11-29 VITALS — SYSTOLIC BLOOD PRESSURE: 102 MMHG | DIASTOLIC BLOOD PRESSURE: 56 MMHG

## 2021-11-29 VITALS — SYSTOLIC BLOOD PRESSURE: 119 MMHG | DIASTOLIC BLOOD PRESSURE: 73 MMHG

## 2021-11-29 LAB
BUN SERPL-MCNC: 14 MG/DL (ref 7–18)
CALCIUM SERPL-MCNC: 8.1 MG/DL (ref 8.8–10.2)
CHLORIDE SERPL-SCNC: 108 MEQ/L (ref 98–107)
CO2 SERPL-SCNC: 28 MEQ/L (ref 21–32)
CREAT SERPL-MCNC: 0.68 MG/DL (ref 0.55–1.3)
GFR SERPL CREATININE-BSD FRML MDRD: > 60 ML/MIN/{1.73_M2} (ref 32–?)
GLUCOSE SERPL-MCNC: 132 MG/DL (ref 70–100)
HCT VFR BLD AUTO: 27.7 % (ref 36–47)
HGB BLD-MCNC: 9.3 G/DL (ref 12–15.5)
MAGNESIUM SERPL-MCNC: 1.9 MG/DL (ref 1.8–2.4)
MCH RBC QN AUTO: 32.3 PG (ref 27–33)
MCHC RBC AUTO-ENTMCNC: 33.6 G/DL (ref 32–36.5)
MCV RBC AUTO: 96.2 FL (ref 80–96)
PHOSPHATE SERPL-MCNC: 1.9 MG/DL (ref 2.5–4.9)
PLATELET # BLD AUTO: 120 10^3/UL (ref 150–450)
POTASSIUM SERPL-SCNC: 3.6 MEQ/L (ref 3.5–5.1)
RBC # BLD AUTO: 2.88 10^6/UL (ref 4–5.4)
SODIUM SERPL-SCNC: 142 MEQ/L (ref 136–145)
WBC # BLD AUTO: 6.4 10^3/UL (ref 4–10)

## 2021-11-29 RX ADMIN — CITALOPRAM HYDROBROMIDE SCH MG: 10 TABLET ORAL at 07:48

## 2021-11-29 RX ADMIN — CYANOCOBALAMIN TAB 500 MCG SCH MCG: 500 TAB at 20:34

## 2021-11-29 RX ADMIN — MEMANTINE SCH MG: 5 TABLET ORAL at 20:34

## 2021-11-29 RX ADMIN — ACETAMINOPHEN PRN MG: 325 TABLET ORAL at 17:38

## 2021-11-29 RX ADMIN — ATORVASTATIN CALCIUM SCH MG: 10 TABLET, FILM COATED ORAL at 20:34

## 2021-11-29 RX ADMIN — RIVAROXABAN SCH MG: 10 TABLET, FILM COATED ORAL at 17:37

## 2021-11-29 RX ADMIN — ACETAMINOPHEN PRN MG: 325 TABLET ORAL at 07:48

## 2021-11-29 RX ADMIN — MEMANTINE SCH MG: 5 TABLET ORAL at 07:48

## 2021-11-29 NOTE — IPNPDOC
Text Note


Date of Service


The patient was seen on 11/29/21.





NOTE


Subjective:


86-year-old female present emergency room department after a fall.  She has on 

the intertrochanteric fracture of the right femur.  She underwent surgery for 

ORIF on 11/27.





Patient was seen and examined at bedside this morning.  She reported feeling 

better today and her pain was controlled.  She had no new complaints.





Review of systems:


10 point review of system was negative except for what is noted in the HPI





Physical exam:


General: Lying in bed, no acute distress


Head/Neck/Throat: Trachea midline, mucous membranes moist


Eyes: Sclera anicteric, no erythema or discharge appreciated bilaterally


Thorax: Normal respiratory effort on room air, lungs clear to auscultation 

bilaterally, no wheezes/rales/rhonchi


Cardiovascular: Normal rate, regular rhythm, normal S1, S2; no S3, S4, 

rubs/gallops/murmurs


Abdomen: Bowel sounds present, soft/nontender/nondistended


Genitourinary: No CVA tenderness, no Leal in place


Musculoskeletal: Moving all extremities, no edema


Skin: Warm, dry


Neurologic: AAOx3, speech fluent and goal-directed, no focal deficits, grossly 

intact





Labs: 


See below





Imaging: 


Please see imaging section 





Assessment/plan:


#Right proximal femur fracture 


-POD#2 ORIF.  Continue with physical therapy.  





#Right ankle pain


-We will obtain x-rays to ensure there is no fracture





#Acute blood loss anemia


-Secondary to surgery.  Continue monitor hemoglobin/hematocrit adequate.  Will 

transfuse if hemoglobin is less than 7.





#GERD


-Continue omeprazole





#Hyperlipidemia


-Continue statin





#Dementia


-Continue memantine





#DVT prophylaxis


-Started on Xarelto, as per orthopedic recommendations.





Disposition: ARU screen in place





VS,Fishbone, I+O


VS, Fishbone, I+O


Laboratory Tests


11/29/21 08:51











Vital Signs








  Date Time  Temp Pulse Resp B/P (MAP) Pulse Ox O2 Delivery O2 Flow Rate FiO2


 


11/29/21 07:49   20   Room Air  


 


11/29/21 06:00 99.1 91  119/73 (88) 99   














I&O- Last 24 Hours up to 6 AM 


 


 11/29/21





 06:00


 


Intake Total 1520 ml


 


Output Total 1375 ml


 


Balance 145 ml

















LEANDER LUCIANO M.D.           Nov 29, 2021 10:34

## 2021-11-29 NOTE — REP
INDICATION:

s/p fall, pain.



COMPARISON:

None.



TECHNIQUE:

Four views



FINDINGS:

No acute fracture or destructive osseous lesion.  The mortise is intact.



IMPRESSION:

No acute osseous abnormality.





<Electronically signed by Sina Jha > 11/29/21 [id number]

## 2021-11-30 VITALS — SYSTOLIC BLOOD PRESSURE: 139 MMHG | DIASTOLIC BLOOD PRESSURE: 79 MMHG

## 2021-11-30 VITALS — DIASTOLIC BLOOD PRESSURE: 83 MMHG | SYSTOLIC BLOOD PRESSURE: 145 MMHG

## 2021-11-30 VITALS — DIASTOLIC BLOOD PRESSURE: 70 MMHG | SYSTOLIC BLOOD PRESSURE: 119 MMHG

## 2021-11-30 LAB
BUN SERPL-MCNC: 13 MG/DL (ref 7–18)
CALCIUM SERPL-MCNC: 8.2 MG/DL (ref 8.8–10.2)
CHLORIDE SERPL-SCNC: 109 MEQ/L (ref 98–107)
CO2 SERPL-SCNC: 27 MEQ/L (ref 21–32)
CREAT SERPL-MCNC: 0.56 MG/DL (ref 0.55–1.3)
GFR SERPL CREATININE-BSD FRML MDRD: > 60 ML/MIN/{1.73_M2} (ref 32–?)
GLUCOSE SERPL-MCNC: 109 MG/DL (ref 70–100)
HCT VFR BLD AUTO: 28.4 % (ref 36–47)
HGB BLD-MCNC: 9.5 G/DL (ref 12–15.5)
MAGNESIUM SERPL-MCNC: 1.8 MG/DL (ref 1.8–2.4)
MCH RBC QN AUTO: 32.2 PG (ref 27–33)
MCHC RBC AUTO-ENTMCNC: 33.5 G/DL (ref 32–36.5)
MCV RBC AUTO: 96.3 FL (ref 80–96)
PHOSPHATE SERPL-MCNC: 2.6 MG/DL (ref 2.5–4.9)
PLATELET # BLD AUTO: 132 10^3/UL (ref 150–450)
POTASSIUM SERPL-SCNC: 3.4 MEQ/L (ref 3.5–5.1)
RBC # BLD AUTO: 2.95 10^6/UL (ref 4–5.4)
SODIUM SERPL-SCNC: 141 MEQ/L (ref 136–145)
WBC # BLD AUTO: 6.5 10^3/UL (ref 4–10)

## 2021-11-30 RX ADMIN — RIVAROXABAN SCH MG: 10 TABLET, FILM COATED ORAL at 17:25

## 2021-11-30 RX ADMIN — CYANOCOBALAMIN TAB 500 MCG SCH MCG: 500 TAB at 19:22

## 2021-11-30 RX ADMIN — ATORVASTATIN CALCIUM SCH MG: 10 TABLET, FILM COATED ORAL at 19:21

## 2021-11-30 RX ADMIN — MEMANTINE SCH MG: 5 TABLET ORAL at 09:05

## 2021-11-30 RX ADMIN — ACETAMINOPHEN PRN MG: 325 TABLET ORAL at 03:02

## 2021-11-30 RX ADMIN — ACETAMINOPHEN PRN MG: 325 TABLET ORAL at 19:21

## 2021-11-30 RX ADMIN — CITALOPRAM HYDROBROMIDE SCH MG: 10 TABLET ORAL at 09:05

## 2021-11-30 RX ADMIN — MEMANTINE SCH MG: 5 TABLET ORAL at 19:21

## 2021-11-30 RX ADMIN — RAMELTEON PRN MG: 8 TABLET ORAL at 19:21

## 2021-11-30 NOTE — IPNPDOC
Subjective


Date Seen


The patient was seen on 11/30/21.





Subjective


Chief Complaint/HPI


Mrs. Pendleton is an 86 year old female with dementia and osteoarthritis who is 

here with right proximal femur fracture s/p mechanical fall. Patient had an ORIF

on 11/27. Due to patient's dementia, patient was seen in her room with her 

. Patient denies any chest pain or dyspnea. Hip pain is controlled. 

 was concerned about Xarelto given her history of GI bleeds. Patient will

need anticoagulation for 35 days. Will monitor for signs of bleeding.





Objective


Physical Examination


General Exam:  Positive: Alert, Cooperative


Eye Exam:  Negative: Sclera icteric


Chest Exam:  Positive: Clear to auscultation; 


   Negative: Rales, Rhonchi, Wheezing


Heart Exam:  Positive: Rate Normal, Regular Rhythm


Abdomen Exam:  Positive: Normal bowel sounds, Soft; 


   Negative: Tenderness


Neuro Exam:  Positive: Normal Speech


Psych Exam:  Positive: Mood NL; 


   Negative: Memory Intact, Oriented x 3





Assessment /Plan


Assessment


Mrs. Pendleton is an 86 year old female with dementia and osteoarthritis who is 

here with right proximal femur fracture s/p mechanical fall. Patient had an ORIF

on 11/27. PT and OT recommending rehab. Patient currently pending ARU screen.





Plan/VTE


VTE Prophylaxis Ordered?:  Yes





Plan


1. Right proximal femur fracture


-S/P ORIF on 11/27


-Patient will need rehab





2. Right ankle pain


-XR negative for fracture





3. Anxiety/Depression


-Continue citalopram





4. Hyperlipidemia


-Continue atorvastatin





5. Dementia


-Continue memantine 





6. DVT ppx


-Per orthopedic surgery Xarelto for a total of 35 days





Disposition: Pending ARU insurance approval.





VS, I&O, 24H, Fishbone


Vital Signs/I&O





Vital Signs








  Date Time  Temp Pulse Resp B/P (MAP) Pulse Ox O2 Delivery O2 Flow Rate FiO2


 


11/30/21 14:00 97.4 90 15 145/83 (103) 95 Room Air  














I&O- Last 24 Hours up to 6 AM 


 


 11/30/21





 06:00


 


Intake Total 590 ml


 


Output Total 1050 ml


 


Balance -460 ml











Laboratory Data


24H LABS


Laboratory Tests 2


11/30/21 05:59: 


Nucleated Red Blood Cells % (auto) 0.0, Anion Gap 5L, Glomerular Filtration Rate

> 60.0, Calcium Level 8.2L, Phosphorus Level 2.6#, Magnesium Level 1.8


CBC/BMP


Laboratory Tests


11/30/21 05:59

















RYAN BARBER DO               Nov 30, 2021 18:12

## 2021-12-01 VITALS — DIASTOLIC BLOOD PRESSURE: 59 MMHG | SYSTOLIC BLOOD PRESSURE: 88 MMHG

## 2021-12-01 VITALS — DIASTOLIC BLOOD PRESSURE: 58 MMHG | SYSTOLIC BLOOD PRESSURE: 100 MMHG

## 2021-12-01 VITALS — DIASTOLIC BLOOD PRESSURE: 64 MMHG | SYSTOLIC BLOOD PRESSURE: 107 MMHG

## 2021-12-01 VITALS — SYSTOLIC BLOOD PRESSURE: 141 MMHG | DIASTOLIC BLOOD PRESSURE: 82 MMHG

## 2021-12-01 LAB
BUN SERPL-MCNC: 17 MG/DL (ref 7–18)
CALCIUM SERPL-MCNC: 7.8 MG/DL (ref 8.8–10.2)
CHLORIDE SERPL-SCNC: 107 MEQ/L (ref 98–107)
CO2 SERPL-SCNC: 28 MEQ/L (ref 21–32)
CREAT SERPL-MCNC: 0.59 MG/DL (ref 0.55–1.3)
GFR SERPL CREATININE-BSD FRML MDRD: > 60 ML/MIN/{1.73_M2} (ref 32–?)
GLUCOSE SERPL-MCNC: 113 MG/DL (ref 70–100)
HCT VFR BLD AUTO: 28.2 % (ref 36–47)
HGB BLD-MCNC: 9.2 G/DL (ref 12–15.5)
MCH RBC QN AUTO: 31.9 PG (ref 27–33)
MCHC RBC AUTO-ENTMCNC: 32.6 G/DL (ref 32–36.5)
MCV RBC AUTO: 97.9 FL (ref 80–96)
PLATELET # BLD AUTO: 139 10^3/UL (ref 150–450)
POTASSIUM SERPL-SCNC: 4.1 MEQ/L (ref 3.5–5.1)
RBC # BLD AUTO: 2.88 10^6/UL (ref 4–5.4)
SODIUM SERPL-SCNC: 142 MEQ/L (ref 136–145)
WBC # BLD AUTO: 5.7 10^3/UL (ref 4–10)

## 2021-12-01 RX ADMIN — CYANOCOBALAMIN TAB 500 MCG SCH MCG: 500 TAB at 21:44

## 2021-12-01 RX ADMIN — ATORVASTATIN CALCIUM SCH MG: 10 TABLET, FILM COATED ORAL at 21:44

## 2021-12-01 RX ADMIN — MEMANTINE SCH MG: 5 TABLET ORAL at 21:44

## 2021-12-01 RX ADMIN — POLYETHYLENE GLYCOL 3350 PRN PKT: 17 POWDER, FOR SOLUTION ORAL at 11:01

## 2021-12-01 RX ADMIN — RIVAROXABAN SCH MG: 10 TABLET, FILM COATED ORAL at 19:07

## 2021-12-01 RX ADMIN — MEMANTINE SCH MG: 5 TABLET ORAL at 09:24

## 2021-12-01 RX ADMIN — ACETAMINOPHEN PRN MG: 325 TABLET ORAL at 09:23

## 2021-12-01 RX ADMIN — CITALOPRAM HYDROBROMIDE SCH MG: 10 TABLET ORAL at 09:25

## 2021-12-01 RX ADMIN — OMEPRAZOLE SCH MG: 20 CAPSULE, DELAYED RELEASE ORAL at 09:26

## 2021-12-01 RX ADMIN — DOCUSATE SODIUM SCH MG: 100 CAPSULE, LIQUID FILLED ORAL at 21:45

## 2021-12-01 RX ADMIN — DOCUSATE SODIUM SCH MG: 100 CAPSULE, LIQUID FILLED ORAL at 11:01

## 2021-12-01 NOTE — IPNPDOC
Subjective


Date Seen


The patient was seen on 12/1/21.





Subjective


Chief Complaint/HPI


Mrs. Pendleton is an 86 year old female with dementia and osteoarthritis who is 

here with right proximal femur fracture s/p mechanical fall. Patient had an ORIF

on 11/27. Patient was seen this morning. She is still confused, but very 

pleasant. Denies chest pain or dyspnea. She also denies any hip pain as long as 

she is not moving.





Objective


Physical Examination


General Exam:  Positive: Alert, Cooperative


Eye Exam:  Negative: Sclera icteric


Chest Exam:  Positive: Clear to auscultation; 


   Negative: Rales, Rhonchi, Wheezing


Heart Exam:  Positive: Rate Normal, Regular Rhythm


Abdomen Exam:  Positive: Normal bowel sounds, Soft; 


   Negative: Tenderness


Neuro Exam:  Positive: Normal Speech


Psych Exam:  Positive: Mood NL; 


   Negative: Memory Intact, Oriented x 3





Assessment /Plan


Assessment


Mrs. Pendleton is an 86 year old female with dementia and osteoarthritis who is 

here with right proximal femur fracture s/p mechanical fall. Patient had an ORIF

on 11/27. PT and OT recommending rehab. Patient currently pending ARU screen.





Plan/VTE


VTE Prophylaxis Ordered?:  Yes





Plan


1. Right proximal femur fracture


-S/P ORIF on 11/27


-Patient will need rehab





2. Right ankle pain


-XR negative for fracture





3. Anxiety/Depression


-Continue citalopram





4. Hyperlipidemia


-Continue atorvastatin





5. Dementia


-Continue memantine 





6. DVT ppx


-Per orthopedic surgery Xarelto for a total of 35 days





Disposition: Pending ARU insurance approval.





VS, I&O, 24H, Fishbone


Vital Signs/I&O





Vital Signs








  Date Time  Temp Pulse Resp B/P (MAP) Pulse Ox O2 Delivery O2 Flow Rate FiO2


 


12/1/21 13:24 97.4 62 20 100/58 (72) 98 Room Air  














I&O- Last 24 Hours up to 6 AM 


 


 12/1/21





 06:00


 


Intake Total 820 ml


 


Output Total 750 ml


 


Balance 70 ml











Laboratory Data


24H LABS


Laboratory Tests 2


12/1/21 06:47: 


Nucleated Red Blood Cells % (auto) 0.0, Anion Gap 7L, Glomerular Filtration Rate

> 60.0, Calcium Level 7.8L


CBC/BMP


Laboratory Tests


12/1/21 06:47

















RYAN BARBER DO                Dec 1, 2021 20:13

## 2021-12-02 VITALS — DIASTOLIC BLOOD PRESSURE: 61 MMHG | SYSTOLIC BLOOD PRESSURE: 110 MMHG

## 2021-12-02 VITALS — SYSTOLIC BLOOD PRESSURE: 118 MMHG | DIASTOLIC BLOOD PRESSURE: 75 MMHG

## 2021-12-02 VITALS — DIASTOLIC BLOOD PRESSURE: 70 MMHG | SYSTOLIC BLOOD PRESSURE: 120 MMHG

## 2021-12-02 VITALS — SYSTOLIC BLOOD PRESSURE: 118 MMHG | DIASTOLIC BLOOD PRESSURE: 68 MMHG

## 2021-12-02 VITALS — DIASTOLIC BLOOD PRESSURE: 76 MMHG | SYSTOLIC BLOOD PRESSURE: 123 MMHG

## 2021-12-02 VITALS — SYSTOLIC BLOOD PRESSURE: 109 MMHG | DIASTOLIC BLOOD PRESSURE: 60 MMHG

## 2021-12-02 LAB
BUN SERPL-MCNC: 17 MG/DL (ref 7–18)
CALCIUM SERPL-MCNC: 8.1 MG/DL (ref 8.8–10.2)
CHLORIDE SERPL-SCNC: 108 MEQ/L (ref 98–107)
CO2 SERPL-SCNC: 27 MEQ/L (ref 21–32)
CREAT SERPL-MCNC: 0.59 MG/DL (ref 0.55–1.3)
GFR SERPL CREATININE-BSD FRML MDRD: > 60 ML/MIN/{1.73_M2} (ref 32–?)
GLUCOSE SERPL-MCNC: 111 MG/DL (ref 70–100)
HCT VFR BLD AUTO: 27.6 % (ref 36–47)
HGB BLD-MCNC: 9.1 G/DL (ref 12–15.5)
MCH RBC QN AUTO: 32.2 PG (ref 27–33)
MCHC RBC AUTO-ENTMCNC: 33 G/DL (ref 32–36.5)
MCV RBC AUTO: 97.5 FL (ref 80–96)
PLATELET # BLD AUTO: 175 10^3/UL (ref 150–450)
POTASSIUM SERPL-SCNC: 3.8 MEQ/L (ref 3.5–5.1)
RBC # BLD AUTO: 2.83 10^6/UL (ref 4–5.4)
SODIUM SERPL-SCNC: 141 MEQ/L (ref 136–145)
WBC # BLD AUTO: 5.5 10^3/UL (ref 4–10)

## 2021-12-02 RX ADMIN — ACETAMINOPHEN PRN MG: 325 TABLET ORAL at 04:36

## 2021-12-02 RX ADMIN — ACETAMINOPHEN PRN MG: 325 TABLET ORAL at 11:06

## 2021-12-02 RX ADMIN — ACETAMINOPHEN PRN MG: 325 TABLET ORAL at 22:07

## 2021-12-02 RX ADMIN — ATORVASTATIN CALCIUM SCH MG: 10 TABLET, FILM COATED ORAL at 22:07

## 2021-12-02 RX ADMIN — DOCUSATE SODIUM SCH MG: 100 CAPSULE, LIQUID FILLED ORAL at 22:07

## 2021-12-02 RX ADMIN — CYANOCOBALAMIN TAB 500 MCG SCH MCG: 500 TAB at 22:07

## 2021-12-02 RX ADMIN — MEMANTINE SCH MG: 5 TABLET ORAL at 11:05

## 2021-12-02 RX ADMIN — DOCUSATE SODIUM SCH MG: 100 CAPSULE, LIQUID FILLED ORAL at 10:59

## 2021-12-02 RX ADMIN — POLYETHYLENE GLYCOL 3350 PRN PKT: 17 POWDER, FOR SOLUTION ORAL at 11:07

## 2021-12-02 RX ADMIN — SENNOSIDES SCH TAB: 8.6 TABLET, FILM COATED ORAL at 22:07

## 2021-12-02 RX ADMIN — RIVAROXABAN SCH MG: 10 TABLET, FILM COATED ORAL at 18:29

## 2021-12-02 RX ADMIN — MEMANTINE SCH MG: 5 TABLET ORAL at 22:07

## 2021-12-02 RX ADMIN — CITALOPRAM HYDROBROMIDE SCH MG: 10 TABLET ORAL at 10:59

## 2021-12-02 RX ADMIN — OMEPRAZOLE SCH MG: 20 CAPSULE, DELAYED RELEASE ORAL at 10:59

## 2021-12-02 NOTE — IPNPDOC
Subjective


Date Seen


The patient was seen on 12/2/21.





Subjective


Chief Complaint/HPI


Mrs. Pendleton is an 86 year old female with dementia and osteoarthritis who is 

here with right proximal femur fracture s/p mechanical fall. Patient had an ORIF

on 11/27. Patient was seen this morning. She denies any chest pain or dyspnea. 

She did have some lightheadedness and dizziness. She is not on medications that 

would cause the symptoms. We'll check orthostatic vitals tomorrow and monitor 

hemoglobin. Otherwise, she has constipation. She is already on Colace and 

MiraLAX. We'll add on senna and as needed bisacodyl suppository.





Objective


Physical Examination


General Exam:  Positive: Alert, Cooperative


Eye Exam:  Negative: Sclera icteric


Chest Exam:  Positive: Clear to auscultation; 


   Negative: Rales, Rhonchi, Wheezing


Heart Exam:  Positive: Rate Normal, Regular Rhythm


Abdomen Exam:  Positive: Normal bowel sounds, Soft; 


   Negative: Tenderness


Neuro Exam:  Positive: Normal Speech


Psych Exam:  Positive: Mood NL; 


   Negative: Memory Intact, Oriented x 3





Assessment /Plan


Assessment


Mrs. Pendleton is an 86 year old female with dementia and osteoarthritis who is 

here with right proximal femur fracture s/p mechanical fall. Patient had an ORIF

on 11/27. PT and OT recommending rehab. Insurance denied ARU.





Plan/VTE


VTE Prophylaxis Ordered?:  Yes





Plan


1. Right proximal femur fracture


-S/P ORIF on 11/27


-Patient will need rehab





2. Right ankle pain


-XR negative for fracture





3. Lightheaded and dizzy


We'll check orthostatics and monitor hemoglobin





4. Constipation


Continue Colace and MiraLAX


Started senna and as needed bisacodyl suppository





5. Anxiety/Depression


-Continue citalopram





6. Hyperlipidemia


-Continue atorvastatin





7. Dementia


-Continue memantine 





8. DVT ppx


-Per orthopedic surgery Xarelto for a total of 35 days





Disposition: ARU declined. Pending placement.





VS, I&O, 24H, Fishbone


Vital Signs/I&O





Vital Signs








  Date Time  Temp Pulse Resp B/P (MAP) Pulse Ox O2 Delivery O2 Flow Rate FiO2


 


12/2/21 14:30 97.5 64 18 118/75 (89) 99 Room Air  














I&O- Last 24 Hours up to 6 AM 


 


 12/2/21





 06:00


 


Intake Total 560 ml


 


Output Total 250 ml


 


Balance 310 ml











Laboratory Data


24H LABS


Laboratory Tests 2


12/2/21 06:45: 


Nucleated Red Blood Cells % (auto) 0.0, Anion Gap 6L, Glomerular Filtration Rate

> 60.0, Calcium Level 8.1L


CBC/BMP


Laboratory Tests


12/2/21 06:45

















RYAN BARBER DO                Dec 2, 2021 18:11

## 2021-12-03 VITALS — DIASTOLIC BLOOD PRESSURE: 72 MMHG | SYSTOLIC BLOOD PRESSURE: 124 MMHG

## 2021-12-03 VITALS — SYSTOLIC BLOOD PRESSURE: 108 MMHG | DIASTOLIC BLOOD PRESSURE: 68 MMHG

## 2021-12-03 VITALS — DIASTOLIC BLOOD PRESSURE: 70 MMHG | SYSTOLIC BLOOD PRESSURE: 129 MMHG

## 2021-12-03 LAB
BUN SERPL-MCNC: 16 MG/DL (ref 7–18)
CALCIUM SERPL-MCNC: 8.2 MG/DL (ref 8.8–10.2)
CHLORIDE SERPL-SCNC: 108 MEQ/L (ref 98–107)
CO2 SERPL-SCNC: 30 MEQ/L (ref 21–32)
CREAT SERPL-MCNC: 0.62 MG/DL (ref 0.55–1.3)
GFR SERPL CREATININE-BSD FRML MDRD: > 60 ML/MIN/{1.73_M2} (ref 32–?)
GLUCOSE SERPL-MCNC: 98 MG/DL (ref 70–100)
HCT VFR BLD AUTO: 29.6 % (ref 36–47)
HGB BLD-MCNC: 9.6 G/DL (ref 12–15.5)
MCH RBC QN AUTO: 31.6 PG (ref 27–33)
MCHC RBC AUTO-ENTMCNC: 32.4 G/DL (ref 32–36.5)
MCV RBC AUTO: 97.4 FL (ref 80–96)
PLATELET # BLD AUTO: 217 10^3/UL (ref 150–450)
POTASSIUM SERPL-SCNC: 4 MEQ/L (ref 3.5–5.1)
RBC # BLD AUTO: 3.04 10^6/UL (ref 4–5.4)
SODIUM SERPL-SCNC: 142 MEQ/L (ref 136–145)
WBC # BLD AUTO: 5.3 10^3/UL (ref 4–10)

## 2021-12-03 RX ADMIN — CITALOPRAM HYDROBROMIDE SCH MG: 10 TABLET ORAL at 08:57

## 2021-12-03 RX ADMIN — ACETAMINOPHEN PRN MG: 325 TABLET ORAL at 08:59

## 2021-12-03 RX ADMIN — DOCUSATE SODIUM SCH MG: 100 CAPSULE, LIQUID FILLED ORAL at 08:56

## 2021-12-03 RX ADMIN — MECLIZINE HYDROCHLORIDE SCH MG: 25 TABLET ORAL at 22:09

## 2021-12-03 RX ADMIN — RAMELTEON PRN MG: 8 TABLET ORAL at 22:11

## 2021-12-03 RX ADMIN — CYANOCOBALAMIN TAB 500 MCG SCH MCG: 500 TAB at 22:09

## 2021-12-03 RX ADMIN — MECLIZINE HYDROCHLORIDE SCH MG: 25 TABLET ORAL at 16:58

## 2021-12-03 RX ADMIN — RIVAROXABAN SCH MG: 10 TABLET, FILM COATED ORAL at 16:58

## 2021-12-03 RX ADMIN — POLYETHYLENE GLYCOL 3350 PRN PKT: 17 POWDER, FOR SOLUTION ORAL at 11:02

## 2021-12-03 RX ADMIN — MEMANTINE SCH MG: 5 TABLET ORAL at 08:57

## 2021-12-03 RX ADMIN — MEMANTINE SCH MG: 5 TABLET ORAL at 22:11

## 2021-12-03 RX ADMIN — SENNOSIDES SCH TAB: 8.6 TABLET, FILM COATED ORAL at 08:56

## 2021-12-03 RX ADMIN — ATORVASTATIN CALCIUM SCH MG: 10 TABLET, FILM COATED ORAL at 22:11

## 2021-12-03 RX ADMIN — MECLIZINE HYDROCHLORIDE SCH MG: 25 TABLET ORAL at 11:02

## 2021-12-03 RX ADMIN — DOCUSATE SODIUM SCH MG: 100 CAPSULE, LIQUID FILLED ORAL at 22:11

## 2021-12-03 RX ADMIN — OMEPRAZOLE SCH MG: 20 CAPSULE, DELAYED RELEASE ORAL at 08:57

## 2021-12-03 RX ADMIN — SENNOSIDES SCH TAB: 8.6 TABLET, FILM COATED ORAL at 22:11

## 2021-12-03 RX ADMIN — ACETAMINOPHEN PRN MG: 325 TABLET ORAL at 22:10

## 2021-12-03 NOTE — IPNPDOC
Subjective


Date Seen


The patient was seen on 12/3/21.





Subjective


Chief Complaint/HPI


Mrs. Pendleton is an 86 year old female with dementia and osteoarthritis who is 

here with right proximal femur fracture s/p mechanical fall. Patient had an ORIF

on 11/27.  This morning, she denies any chest pain or dyspnea.  She tells me 

that her lightheadedness/dizziness is intermittent.  It feels like there is a 

motion.  CT head was negative.  Starting patient on scheduled meclizine.





Objective


Physical Examination


General Exam:  Positive: Alert, Cooperative


Eye Exam:  Negative: Sclera icteric


Chest Exam:  Positive: Clear to auscultation; 


   Negative: Rales, Rhonchi, Wheezing


Heart Exam:  Positive: Rate Normal, Regular Rhythm


Abdomen Exam:  Positive: Normal bowel sounds, Soft; 


   Negative: Tenderness


Neuro Exam:  Positive: Normal Speech


Psych Exam:  Positive: Mood NL; 


   Negative: Memory Intact, Oriented x 3





Assessment /Plan


Assessment


Mrs. Pendleton is an 86 year old female with dementia and osteoarthritis who is 

here with right proximal femur fracture s/p mechanical fall. Patient had an ORIF

on 11/27. PT and OT recommending rehab. Insurance denied ARU.  Pending 

placement.





Plan/VTE


VTE Prophylaxis Ordered?:  Yes





Plan


1. Right proximal femur fracture


-S/P ORIF on 11/27


-Patient will need rehab





2. Right ankle pain


-XR negative for fracture





3. Lightheaded and dizzy


We'll check orthostatics and monitor hemoglobin





4. Constipation


Continue Colace and MiraLAX


Started senna and as needed bisacodyl suppository





5. Anxiety/Depression


-Continue citalopram





6. Hyperlipidemia


-Continue atorvastatin





7. Dementia


-Continue memantine 





8.  Vertigo


CT head negative


Continue meclizine





9.  DVT ppx


-Per orthopedic surgery Xarelto for a total of 35 days





Disposition: ARU declined. Pending subacute rehab





VS, I&O, 24H, Елена


Vital Signs/I&O





Vital Signs








  Date Time  Temp Pulse Resp B/P (MAP) Pulse Ox O2 Delivery O2 Flow Rate FiO2


 


12/3/21 05:32 97.5 80 18 125/74 (91) 96 Room Air  














I&O- Last 24 Hours up to 6 AM 


 


 12/3/21





 06:00


 


Intake Total 540 ml


 


Output Total 350 ml


 


Balance 190 ml











Laboratory Data


24H LABS


Laboratory Tests 2


12/3/21 05:36: 


Nucleated Red Blood Cells % (auto) 0.0, Anion Gap 4L, Glomerular Filtration Rate

> 60.0, Calcium Level 8.2L


CBC/BMP


Laboratory Tests


12/3/21 05:36

















RYAN BARBER DO                Dec 3, 2021 15:37

## 2021-12-03 NOTE — REPVR
PROCEDURE INFORMATION: 

Exam: CT Head Without Contrast 

Exam date and time: 12/3/2021 10:04 AM 

Age: 86 years old 

Clinical indication: Dizziness; Additional info: Foggy, vertigo 



TECHNIQUE: 

Imaging protocol: Computed tomography of the head without contrast. 

Radiation optimization: All CT scans at this facility use at least one of these 

dose optimization techniques: automated exposure control; mA and/or kV 

adjustment per patient size (includes targeted exams where dose is matched to 

clinical indication); or iterative reconstruction. 



COMPARISON: 

CT Head without contrast 10/21/2021 11:56 AM 



FINDINGS: 

Brain: There is no acute intracranial hemorrhage or mass effect. Moderate 

diffuse volume loss is within the range of normal for patient age. There are 

small vessel ischemic changes within the periventricular and subcortical white 

matter, but the normal gray-white matter delineation is maintained. 

Cerebral ventricles:  Prominence of the ventricular system is commensurate with 

volume loss.

Paranasal sinuses: Visualized sinuses are unremarkable. No fluid levels. 

Mastoid air cells: Visualized mastoid air cells are well aerated. 

Bones/joints: Unremarkable. No acute fracture. 

Soft tissues: Unremarkable. 



IMPRESSION: 

No acute hemorrhage or edema.



Electronically signed by: Milana Stovall On 12/03/2021  10:26:14 AM

## 2021-12-04 VITALS — SYSTOLIC BLOOD PRESSURE: 101 MMHG | DIASTOLIC BLOOD PRESSURE: 62 MMHG

## 2021-12-04 VITALS — DIASTOLIC BLOOD PRESSURE: 63 MMHG | SYSTOLIC BLOOD PRESSURE: 97 MMHG

## 2021-12-04 VITALS — DIASTOLIC BLOOD PRESSURE: 73 MMHG | SYSTOLIC BLOOD PRESSURE: 122 MMHG

## 2021-12-04 LAB
BUN SERPL-MCNC: 15 MG/DL (ref 7–18)
CALCIUM SERPL-MCNC: 8.3 MG/DL (ref 8.8–10.2)
CHLORIDE SERPL-SCNC: 110 MEQ/L (ref 98–107)
CO2 SERPL-SCNC: 29 MEQ/L (ref 21–32)
CREAT SERPL-MCNC: 0.64 MG/DL (ref 0.55–1.3)
GFR SERPL CREATININE-BSD FRML MDRD: > 60 ML/MIN/{1.73_M2} (ref 32–?)
GLUCOSE SERPL-MCNC: 107 MG/DL (ref 70–100)
HCT VFR BLD AUTO: 29.2 % (ref 36–47)
HGB BLD-MCNC: 9.6 G/DL (ref 12–15.5)
MCH RBC QN AUTO: 32.4 PG (ref 27–33)
MCHC RBC AUTO-ENTMCNC: 32.9 G/DL (ref 32–36.5)
MCV RBC AUTO: 98.6 FL (ref 80–96)
PLATELET # BLD AUTO: 209 10^3/UL (ref 150–450)
POTASSIUM SERPL-SCNC: 4.2 MEQ/L (ref 3.5–5.1)
RBC # BLD AUTO: 2.96 10^6/UL (ref 4–5.4)
SODIUM SERPL-SCNC: 145 MEQ/L (ref 136–145)
WBC # BLD AUTO: 4 10^3/UL (ref 4–10)

## 2021-12-04 RX ADMIN — ATORVASTATIN CALCIUM SCH MG: 10 TABLET, FILM COATED ORAL at 22:07

## 2021-12-04 RX ADMIN — MECLIZINE HYDROCHLORIDE SCH MG: 25 TABLET ORAL at 17:10

## 2021-12-04 RX ADMIN — OMEPRAZOLE SCH MG: 20 CAPSULE, DELAYED RELEASE ORAL at 09:49

## 2021-12-04 RX ADMIN — CITALOPRAM HYDROBROMIDE SCH MG: 10 TABLET ORAL at 09:49

## 2021-12-04 RX ADMIN — SENNOSIDES SCH TAB: 8.6 TABLET, FILM COATED ORAL at 09:48

## 2021-12-04 RX ADMIN — DOCUSATE SODIUM SCH MG: 100 CAPSULE, LIQUID FILLED ORAL at 09:53

## 2021-12-04 RX ADMIN — MEMANTINE SCH MG: 5 TABLET ORAL at 09:48

## 2021-12-04 RX ADMIN — MECLIZINE HYDROCHLORIDE SCH MG: 25 TABLET ORAL at 22:07

## 2021-12-04 RX ADMIN — SENNOSIDES SCH TAB: 8.6 TABLET, FILM COATED ORAL at 22:07

## 2021-12-04 RX ADMIN — RAMELTEON PRN MG: 8 TABLET ORAL at 22:07

## 2021-12-04 RX ADMIN — MECLIZINE HYDROCHLORIDE SCH MG: 25 TABLET ORAL at 09:49

## 2021-12-04 RX ADMIN — MEMANTINE SCH MG: 5 TABLET ORAL at 22:07

## 2021-12-04 RX ADMIN — DOCUSATE SODIUM SCH MG: 100 CAPSULE, LIQUID FILLED ORAL at 22:06

## 2021-12-04 RX ADMIN — CYANOCOBALAMIN TAB 500 MCG SCH MCG: 500 TAB at 22:07

## 2021-12-04 RX ADMIN — RIVAROXABAN SCH MG: 10 TABLET, FILM COATED ORAL at 17:10

## 2021-12-04 NOTE — IPNPDOC
Subjective


Date Seen


The patient was seen on 12/4/21.





Subjective


Chief Complaint/HPI


Mrs. Pendleton is an 86 year old female with dementia and osteoarthritis who is 

here with right proximal femur fracture s/p mechanical fall. Patient had an ORIF

on 11/27. This morning, she denied any chest pain or dyspnea. She told me she 

stood up and did not have the dizziness anymore.





Objective


Physical Examination


General Exam:  Positive: Alert, Cooperative


Eye Exam:  Negative: Sclera icteric


Chest Exam:  Positive: Clear to auscultation; 


   Negative: Rales, Rhonchi, Wheezing


Heart Exam:  Positive: Rate Normal, Regular Rhythm


Abdomen Exam:  Positive: Normal bowel sounds, Soft; 


   Negative: Tenderness


Neuro Exam:  Positive: Normal Speech


Psych Exam:  Positive: Mood NL; 


   Negative: Memory Intact, Oriented x 3





Assessment /Plan


Assessment


Mrs. Pendleton is an 86 year old female with dementia and osteoarthritis who is 

here with right proximal femur fracture s/p mechanical fall. Patient had an ORIF

on 11/27. PT and OT recommending rehab. Insurance denied ARU.  Pending 

placement.





Plan/VTE


VTE Prophylaxis Ordered?:  Yes





Plan


1. Right proximal femur fracture


-S/P ORIF on 11/27


-Patient will need rehab





2. Right ankle pain


-XR negative for fracture





3. Lightheaded and dizzy


We'll check orthostatics and monitor hemoglobin





4. Constipation


Continue Colace and MiraLAX


Started senna and as needed bisacodyl suppository





5. Anxiety/Depression


-Continue citalopram





6. Hyperlipidemia


-Continue atorvastatin





7. Dementia


-Continue memantine 





8.  Vertigo


CT head negative


Continue meclizine


-Improved with meclizine





9.  DVT ppx


-Per orthopedic surgery Xarelto for a total of 35 days





Disposition: ARU declined. Pending subacute rehab. Patient will be made AL[facility] 

today.





VS, I&O, 24H, Fishbone


Vital Signs/I&O





Vital Signs








  Date Time  Temp Pulse Resp B/P (MAP) Pulse Ox O2 Delivery O2 Flow Rate FiO2


 


12/4/21 05:56 97.0 78 18 122/73 (89) 97 Room Air  














I&O- Last 24 Hours up to 6 AM 


 


 12/4/21





 06:00


 


Intake Total 1225 ml


 


Output Total 100 ml


 


Balance 1125 ml











Laboratory Data


24H LABS


Laboratory Tests 2


12/4/21 06:54: 


Nucleated Red Blood Cells % (auto) 0.0, Anion Gap 6L, Glomerular Filtration Rate

> 60.0, Calcium Level 8.3L


CBC/BMP


Laboratory Tests


12/4/21 06:54

















RYAN BARBER DO                Dec 4, 2021 10:26

## 2021-12-05 VITALS — DIASTOLIC BLOOD PRESSURE: 50 MMHG | SYSTOLIC BLOOD PRESSURE: 91 MMHG

## 2021-12-05 VITALS — SYSTOLIC BLOOD PRESSURE: 140 MMHG | DIASTOLIC BLOOD PRESSURE: 76 MMHG

## 2021-12-05 VITALS — SYSTOLIC BLOOD PRESSURE: 137 MMHG | DIASTOLIC BLOOD PRESSURE: 84 MMHG

## 2021-12-05 LAB
BUN SERPL-MCNC: 13 MG/DL (ref 7–18)
CALCIUM SERPL-MCNC: 8.4 MG/DL (ref 8.8–10.2)
CHLORIDE SERPL-SCNC: 107 MEQ/L (ref 98–107)
CO2 SERPL-SCNC: 29 MEQ/L (ref 21–32)
CREAT SERPL-MCNC: 0.61 MG/DL (ref 0.55–1.3)
GFR SERPL CREATININE-BSD FRML MDRD: > 60 ML/MIN/{1.73_M2} (ref 32–?)
GLUCOSE SERPL-MCNC: 108 MG/DL (ref 70–100)
HCT VFR BLD AUTO: 29.7 % (ref 36–47)
HGB BLD-MCNC: 9.5 G/DL (ref 12–15.5)
MCH RBC QN AUTO: 31.4 PG (ref 27–33)
MCHC RBC AUTO-ENTMCNC: 32 G/DL (ref 32–36.5)
MCV RBC AUTO: 98 FL (ref 80–96)
PLATELET # BLD AUTO: 227 10^3/UL (ref 150–450)
POTASSIUM SERPL-SCNC: 3.8 MEQ/L (ref 3.5–5.1)
RBC # BLD AUTO: 3.03 10^6/UL (ref 4–5.4)
SODIUM SERPL-SCNC: 141 MEQ/L (ref 136–145)
WBC # BLD AUTO: 4.9 10^3/UL (ref 4–10)

## 2021-12-05 RX ADMIN — SENNOSIDES SCH TAB: 8.6 TABLET, FILM COATED ORAL at 20:43

## 2021-12-05 RX ADMIN — MEMANTINE SCH MG: 5 TABLET ORAL at 10:27

## 2021-12-05 RX ADMIN — CYANOCOBALAMIN TAB 500 MCG SCH MCG: 500 TAB at 20:41

## 2021-12-05 RX ADMIN — ATORVASTATIN CALCIUM SCH MG: 10 TABLET, FILM COATED ORAL at 20:43

## 2021-12-05 RX ADMIN — DOCUSATE SODIUM SCH MG: 100 CAPSULE, LIQUID FILLED ORAL at 20:43

## 2021-12-05 RX ADMIN — ACETAMINOPHEN PRN MG: 325 TABLET ORAL at 20:42

## 2021-12-05 RX ADMIN — DOCUSATE SODIUM SCH MG: 100 CAPSULE, LIQUID FILLED ORAL at 10:27

## 2021-12-05 RX ADMIN — RIVAROXABAN SCH MG: 10 TABLET, FILM COATED ORAL at 17:53

## 2021-12-05 RX ADMIN — OMEPRAZOLE SCH MG: 20 CAPSULE, DELAYED RELEASE ORAL at 10:27

## 2021-12-05 RX ADMIN — MEMANTINE SCH MG: 5 TABLET ORAL at 20:41

## 2021-12-05 RX ADMIN — MECLIZINE HYDROCHLORIDE SCH MG: 25 TABLET ORAL at 10:27

## 2021-12-05 RX ADMIN — SENNOSIDES SCH TAB: 8.6 TABLET, FILM COATED ORAL at 10:27

## 2021-12-05 RX ADMIN — MECLIZINE HYDROCHLORIDE SCH MG: 25 TABLET ORAL at 17:52

## 2021-12-05 RX ADMIN — CITALOPRAM HYDROBROMIDE SCH MG: 10 TABLET ORAL at 10:27

## 2021-12-05 RX ADMIN — RAMELTEON PRN MG: 8 TABLET ORAL at 20:42

## 2021-12-05 RX ADMIN — MECLIZINE HYDROCHLORIDE SCH MG: 25 TABLET ORAL at 20:43

## 2021-12-06 VITALS — DIASTOLIC BLOOD PRESSURE: 69 MMHG | SYSTOLIC BLOOD PRESSURE: 114 MMHG

## 2021-12-06 RX ADMIN — SENNOSIDES SCH TAB: 8.6 TABLET, FILM COATED ORAL at 08:37

## 2021-12-06 RX ADMIN — ACETAMINOPHEN PRN MG: 325 TABLET ORAL at 20:26

## 2021-12-06 RX ADMIN — RAMELTEON PRN MG: 8 TABLET ORAL at 20:25

## 2021-12-06 RX ADMIN — MECLIZINE HYDROCHLORIDE SCH MG: 25 TABLET ORAL at 08:37

## 2021-12-06 RX ADMIN — RIVAROXABAN SCH MG: 10 TABLET, FILM COATED ORAL at 17:33

## 2021-12-06 RX ADMIN — CITALOPRAM HYDROBROMIDE SCH MG: 10 TABLET ORAL at 08:37

## 2021-12-06 RX ADMIN — OMEPRAZOLE SCH MG: 20 CAPSULE, DELAYED RELEASE ORAL at 08:37

## 2021-12-06 RX ADMIN — MECLIZINE HYDROCHLORIDE SCH MG: 25 TABLET ORAL at 17:33

## 2021-12-06 RX ADMIN — CYANOCOBALAMIN TAB 500 MCG SCH MCG: 500 TAB at 20:25

## 2021-12-06 RX ADMIN — MEMANTINE SCH MG: 5 TABLET ORAL at 08:37

## 2021-12-06 RX ADMIN — MEMANTINE SCH MG: 5 TABLET ORAL at 20:25

## 2021-12-06 RX ADMIN — MECLIZINE HYDROCHLORIDE SCH MG: 25 TABLET ORAL at 20:25

## 2021-12-06 RX ADMIN — DOCUSATE SODIUM SCH MG: 100 CAPSULE, LIQUID FILLED ORAL at 20:25

## 2021-12-06 RX ADMIN — ATORVASTATIN CALCIUM SCH MG: 10 TABLET, FILM COATED ORAL at 20:26

## 2021-12-06 RX ADMIN — DOCUSATE SODIUM SCH MG: 100 CAPSULE, LIQUID FILLED ORAL at 08:37

## 2021-12-06 RX ADMIN — SENNOSIDES SCH TAB: 8.6 TABLET, FILM COATED ORAL at 20:26

## 2021-12-07 VITALS — SYSTOLIC BLOOD PRESSURE: 114 MMHG | DIASTOLIC BLOOD PRESSURE: 68 MMHG

## 2021-12-07 LAB
BUN SERPL-MCNC: 18 MG/DL (ref 7–18)
CALCIUM SERPL-MCNC: 8.7 MG/DL (ref 8.8–10.2)
CHLORIDE SERPL-SCNC: 107 MEQ/L (ref 98–107)
CO2 SERPL-SCNC: 28 MEQ/L (ref 21–32)
CREAT SERPL-MCNC: 0.85 MG/DL (ref 0.55–1.3)
GFR SERPL CREATININE-BSD FRML MDRD: > 60 ML/MIN/{1.73_M2} (ref 32–?)
GLUCOSE SERPL-MCNC: 110 MG/DL (ref 70–100)
HCT VFR BLD AUTO: 30.7 % (ref 36–47)
HGB BLD-MCNC: 10 G/DL (ref 12–15.5)
MCH RBC QN AUTO: 32.1 PG (ref 27–33)
MCHC RBC AUTO-ENTMCNC: 32.6 G/DL (ref 32–36.5)
MCV RBC AUTO: 98.4 FL (ref 80–96)
PLATELET # BLD AUTO: 288 10^3/UL (ref 150–450)
POTASSIUM SERPL-SCNC: 3.8 MEQ/L (ref 3.5–5.1)
RBC # BLD AUTO: 3.12 10^6/UL (ref 4–5.4)
SODIUM SERPL-SCNC: 142 MEQ/L (ref 136–145)
WBC # BLD AUTO: 6.7 10^3/UL (ref 4–10)

## 2021-12-07 RX ADMIN — MEMANTINE SCH MG: 5 TABLET ORAL at 09:31

## 2021-12-07 RX ADMIN — SENNOSIDES SCH TAB: 8.6 TABLET, FILM COATED ORAL at 09:31

## 2021-12-07 RX ADMIN — OMEPRAZOLE SCH MG: 20 CAPSULE, DELAYED RELEASE ORAL at 09:31

## 2021-12-07 RX ADMIN — MECLIZINE HYDROCHLORIDE SCH MG: 25 TABLET ORAL at 09:31

## 2021-12-07 RX ADMIN — CITALOPRAM HYDROBROMIDE SCH MG: 10 TABLET ORAL at 09:31

## 2021-12-07 RX ADMIN — DOCUSATE SODIUM SCH MG: 100 CAPSULE, LIQUID FILLED ORAL at 09:31

## 2021-12-07 NOTE — DS.PDOC
Discharge Summary


General


Date of Admission


Nov 27, 2021 at 09:59


Date of Discharge


12/7/2021


Attending Physician:  JOSUE MATTHEWS MD


Specialist/Consultants Involve:  SHAHEED AVALOS MD





Discharge Summary


PROCEDURES PERFORMED DURING STAY: underwent surgery for ORIF on 11/27.





ADMITTING DIAGNOSES: 


R femur fracture





DISCHARGE DIAGNOSES:


Closed Intertrochanteric Fracture Of Right Femur. 


Acute on chronic anemia, presumed 2/2 acute blood loss postop


GERD


Hyperlipidemia


Dementia





COMPLICATIONS/CHIEF COMPLAINT: Closed Intertrochanteric Fracture Of Right Femur.





HISTORY OF PRESENT ILLNESS:


86-year-old W with a past medical history of dementia, GERD, hyperlipidemia, and

depression presented emergency room department after a fall.  She reported 

getting up from bed with a cane but dropped to the floor and subsequently she 

fell.  








HOSPITAL COURSE:


In the emergency department, she denied prodromal symptoms including dizziness, 

palpitations, nausea, vomiting, diaphoresis; as well as shortness of breath and 

chest pain at the time.  The emergency room department and x-ray noted a 

intertrochanteric fracture of the proximal right femur. She underwent surgery 

for ORIF on 11/27 and postop course was c/b acute postop anemia likely 2/2 blood

loss and orthostasis with dizziness that improved. She is now being discharged 

to [facility] rehab. Of note, she was placed on 10mg xarelto daily 

for DVT ppx for the next 30d post ORIF per orthopedics.





DISCHARGE MEDICATIONS: Please see below.


 


ALLERGIES: Please see below.





PHYSICAL EXAMINATION ON DISCHARGE:


VITAL SIGNS: Please see below.


General: no acute distress, sitting up in chair


Head/Neck/Throat: Trachea midline, mucous membranes moist


Eyes: Sclera anicteric, no erythema, EOMI


Thorax: Normal respiratory effort on room air, lungs clear to auscultation 

bilaterally, no wheezes/rales/rhonchi


Cardiovascular: Normal rate, regular rhythm, normal S1, S2; no S3, S4, 

rubs/gallops/murmurs


Abdomen: Bowel sounds present, soft/nontender/nondistended


Genitourinary: No CVA tenderness, no Leal in place


Musculoskeletal: Moving all extremities, no edema


Neurologic: AAOx3, speech fluent and goal-directed, no focal deficits, grossly 

intact





LABORATORY DATA: Please see below.





IMAGING: 





Chest, 1 view 


FINDINGS:


The mediastinum and cardiac silhouette are stable and within normal limits for


portable technique.  The lung fields are clear without acute consolidation, 

effusion,


or pneumothorax.  Skeletal structures are intact.





IMPRESSION:


No acute cardiopulmonary process appreciated.  





Femur  RIGHT 


FINDINGS:


Visualized osseous structures are intact and without evidence for acute fracture

or


dislocation.  Age-related changes at the knee noted.  No subcutaneous emphysema 

or


foreign body.





IMPRESSION:


No acute fracture or dislocation.





Hip,AP,LAT to include Pelvis  RIGHT 


FINDINGS:


Comminuted displaced intertrochanteric fracture of the right femur.





Remainder of the examination demonstrates age-related changes.  No further acute


fracture or dislocation appreciated.





IMPRESSION:


Acute comminuted displaced angulated intertrochanteric fracture proximal right 

femur.





CT head:


Brain: There is no acute intracranial hemorrhage or mass effect. Moderate 


diffuse volume loss is within the range of normal for patient age. There are 


small vessel ischemic changes within the periventricular and subcortical white 


matter, but the normal gray-white matter delineation is maintained. 


Cerebral ventricles:  Prominence of the ventricular system is commensurate with 


volume loss.


Paranasal sinuses: Visualized sinuses are unremarkable. No fluid levels. 


Mastoid air cells: Visualized mastoid air cells are well aerated. 


Bones/joints: Unremarkable. No acute fracture. 


Soft tissues: Unremarkable. 





IMPRESSION: 


No acute hemorrhage or edema.








PROGNOSIS: good





ACTIVITY: As tolerated





DIET: regular





DISCHARGE PLAN: [facility] rehab





DISPOSITION: [facility] rehab





DISCHARGE INSTRUCTIONS:


Rehab per ortho, GP within 7d, ortho f/u within 2w





ITEMS TO FOLLOWUP ON ON OUTPATIENT:


Hip fracture





DISCHARGE CONDITION: Stable





TIME SPENT ON DISCHARGE: 44 minutes.





Vital Signs/I&Os





Vital Signs








  Date Time  Temp Pulse Resp B/P (MAP) Pulse Ox O2 Delivery O2 Flow Rate FiO2


 


12/7/21 06:00 98.3 77 18 114/68 (83) 98 Room Air  














I&O- Last 24 Hours up to 6 AM 


 


 12/7/21





 06:00


 


Intake Total 580 ml


 


Balance 580 ml











Laboratory Data


Labs 24H


Laboratory Tests 2


12/7/21 10:38: 


Nucleated Red Blood Cells % (auto) 0.0, Anion Gap 7L, Glomerular Filtration Rate

> 60.0, Calcium Level 8.7L


12/7/21 12:48: Coronavirus (COVID-19)(PCR) NEGATIVE


CBC/BMP


Laboratory Tests


12/7/21 10:38











Discharge Medications


Scheduled


Atorvastatin Calcium (Atorvastatin Calcium) 10 Mg Tab, 10 MG PO QHS, (Reported)


Cholecalciferol (Vitamin D3) (Vitamin D3) 50 Mcg Tablet, 50 MCG PO DAILY, 

(Reported)


Citalopram Hydrobromide (Citalopram HBr) 10 Mg Tablet, 10 MG PO DAILY, 

(Reported)


Cyanocobalamin (Vitamin B-12) (Vitamin B-12) 1,000 Mcg Tab, 1,000 MCG PO QHS, 

(Reported)


Docusate Sodium (Colace) 100 Mg Capsule, 100 MG PO BID


Memantine HCl (Namenda) 10 Mg Tablet, 10 MG PO BID, (Reported)


Omeprazole (Omeprazole) 40 Mg Capsule.dr, 40 MG PO DAILY, (Reported)


Rivaroxaban (Xarelto) 10 Mg Tablet, 10 MG PO DAILY@18


[prevagen]  , 1 TAB PO DAILY, (Reported)





Scheduled PRN


Acetaminophen (Acetaminophen) 325 Mg Tablet, 650 MG PO Q6H PRN for MILD PAIN (PS

 1-4)


Meclizine HCl (Meclizine HCl) 25 Mg Tablet, 25 MG PO TID PRN for DIZZINESS





Allergies


Coded Allergies:  


     iodine (Verified  Allergy, Intermediate, hives, 9/24/20)


   contast media


     amoxicillin (Verified  Allergy, Unknown, 10/1/19)


     NSAIDS (Non-Steroidal Anti-Inflamma (Verified  Adverse Reaction, Intermedia

te, bleeding ulcers, 10/1/19)


     clavulanic acid (Verified  Adverse Reaction, Intermediate, rash/ C-diff, 

9/24/20)


     naproxen (Verified  Adverse Reaction, Intermediate, GI bleeds, 10/1/19)


     cephalexin (Verified  Adverse Reaction, Unknown, yeast infection, 11/27/21)











JOSUE MATTHEWS MD    Dec 7, 2021 14:29

## 2021-12-13 ENCOUNTER — HOSPITAL ENCOUNTER (OUTPATIENT)
Dept: HOSPITAL 53 - M SOG | Age: 86
End: 2021-12-13
Attending: ORTHOPAEDIC SURGERY
Payer: MEDICARE

## 2021-12-13 DIAGNOSIS — M17.11: ICD-10-CM

## 2021-12-13 DIAGNOSIS — Z47.89: Primary | ICD-10-CM

## 2021-12-13 DIAGNOSIS — M16.11: ICD-10-CM

## 2021-12-13 DIAGNOSIS — S72.141D: ICD-10-CM
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
## 2022-03-03 ENCOUNTER — HOSPITAL ENCOUNTER (OUTPATIENT)
Dept: HOSPITAL 53 - M SOG | Age: 87
End: 2022-03-03
Attending: ORTHOPAEDIC SURGERY
Payer: MEDICARE

## 2022-03-03 DIAGNOSIS — S72.141D: Primary | ICD-10-CM
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
## 2022-04-28 ENCOUNTER — HOSPITAL ENCOUNTER (OUTPATIENT)
Dept: HOSPITAL 53 - M SOG | Age: 87
End: 2022-04-28
Attending: ORTHOPAEDIC SURGERY
Payer: MEDICARE

## 2022-04-28 DIAGNOSIS — M25.551: ICD-10-CM

## 2022-04-28 DIAGNOSIS — S72.141D: Primary | ICD-10-CM

## 2022-07-19 ENCOUNTER — HOSPITAL ENCOUNTER (OUTPATIENT)
Dept: HOSPITAL 53 - M SOG | Age: 87
End: 2022-07-19
Attending: ORTHOPAEDIC SURGERY
Payer: MEDICARE

## 2022-07-19 DIAGNOSIS — S72.141D: Primary | ICD-10-CM

## 2022-07-19 DIAGNOSIS — M25.551: ICD-10-CM

## 2022-11-09 ENCOUNTER — HOSPITAL ENCOUNTER (OUTPATIENT)
Dept: HOSPITAL 53 - M SOG | Age: 87
End: 2022-11-09
Attending: ORTHOPAEDIC SURGERY
Payer: MEDICARE

## 2022-11-09 DIAGNOSIS — M25.551: Primary | ICD-10-CM

## 2022-11-09 DIAGNOSIS — S72.414D: ICD-10-CM

## 2023-02-02 ENCOUNTER — HOSPITAL ENCOUNTER (OUTPATIENT)
Dept: HOSPITAL 53 - M PLAIMG | Age: 88
End: 2023-02-02
Attending: PHYSICIAN ASSISTANT
Payer: MEDICARE
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
## 2023-02-02 DIAGNOSIS — J90: ICD-10-CM
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
## 2023-02-02 DIAGNOSIS — S22.41XA: Primary | ICD-10-CM

## 2023-04-25 ENCOUNTER — HOSPITAL ENCOUNTER (OUTPATIENT)
Dept: HOSPITAL 53 - M WHC | Age: 88
End: 2023-04-25
Attending: PHYSICIAN ASSISTANT
Payer: MEDICARE

## 2023-04-25 DIAGNOSIS — M85.80: Primary | ICD-10-CM

## 2023-05-09 ENCOUNTER — HOSPITAL ENCOUNTER (OUTPATIENT)
Dept: HOSPITAL 53 - M LAB REF | Age: 88
End: 2023-05-09
Attending: PHYSICIAN ASSISTANT
Payer: MEDICARE

## 2023-05-09 DIAGNOSIS — R30.0: Primary | ICD-10-CM

## 2023-06-01 ENCOUNTER — HOSPITAL ENCOUNTER (OUTPATIENT)
Dept: HOSPITAL 53 - M WUC | Age: 88
End: 2023-06-01
Attending: NURSE PRACTITIONER
Payer: MEDICARE

## 2023-06-01 DIAGNOSIS — M25.461: ICD-10-CM

## 2023-06-01 DIAGNOSIS — M79.661: ICD-10-CM

## 2023-06-01 DIAGNOSIS — M25.561: Primary | ICD-10-CM

## 2023-09-11 ENCOUNTER — HOSPITAL ENCOUNTER (OUTPATIENT)
Dept: HOSPITAL 53 - M LAB REF | Age: 88
End: 2023-09-11
Attending: PHYSICIAN ASSISTANT
Payer: MEDICARE

## 2023-09-11 DIAGNOSIS — F03.90: ICD-10-CM

## 2023-09-11 DIAGNOSIS — N39.0: Primary | ICD-10-CM

## 2023-09-11 LAB
APPEARANCE UR: (no result)
BACTERIA UR QL AUTO: NEGATIVE
BILIRUB UR QL STRIP.AUTO: NEGATIVE
CAOX CRY URNS QL MICRO: (no result)
GLUCOSE UR QL STRIP.AUTO: NEGATIVE MG/DL
HGB UR QL STRIP.AUTO: (no result)
KETONES UR QL STRIP.AUTO: (no result) MG/DL
LEUKOCYTE ESTERASE UR QL STRIP.AUTO: (no result)
MUCOUS THREADS URNS QL MICRO: (no result)
NITRITE UR QL STRIP.AUTO: NEGATIVE
PH UR STRIP.AUTO: 5 UNITS (ref 5–9)
PROT UR QL STRIP.AUTO: NEGATIVE MG/DL
RBC # UR AUTO: 17 /HPF (ref 0–3)
SP GR UR STRIP.AUTO: 1.02 (ref 1–1.03)
SQUAMOUS #/AREA URNS AUTO: 5 /HPF (ref 0–6)
UROBILINOGEN UR QL STRIP.AUTO: 2 MG/DL (ref 0–2)
WBC #/AREA URNS AUTO: 2 /HPF (ref 0–3)

## 2023-10-11 ENCOUNTER — HOSPITAL ENCOUNTER (OUTPATIENT)
Dept: HOSPITAL 53 - M PLAIMG | Age: 88
End: 2023-10-11
Attending: PHYSICIAN ASSISTANT
Payer: MEDICARE

## 2023-10-11 DIAGNOSIS — S82.141D: Primary | ICD-10-CM

## 2023-11-03 ENCOUNTER — HOSPITAL ENCOUNTER (OUTPATIENT)
Dept: HOSPITAL 53 - M LAB REF | Age: 88
End: 2023-11-03
Attending: PHYSICIAN ASSISTANT
Payer: MEDICARE

## 2023-11-03 DIAGNOSIS — N39.0: Primary | ICD-10-CM

## 2024-02-22 ENCOUNTER — HOSPITAL ENCOUNTER (OUTPATIENT)
Dept: HOSPITAL 53 - M LAB REF | Age: 89
End: 2024-02-22
Attending: PHYSICIAN ASSISTANT
Payer: MEDICARE

## 2024-02-22 DIAGNOSIS — N39.0: Primary | ICD-10-CM

## 2024-03-07 ENCOUNTER — HOSPITAL ENCOUNTER (OUTPATIENT)
Dept: HOSPITAL 53 - M LAB REF | Age: 89
End: 2024-03-07
Attending: PHYSICIAN ASSISTANT
Payer: MEDICARE

## 2024-03-07 DIAGNOSIS — N39.0: Primary | ICD-10-CM

## 2024-03-07 LAB
APPEARANCE UR: CLEAR
BACTERIA UR QL AUTO: NEGATIVE
BILIRUB UR QL STRIP.AUTO: NEGATIVE
GLUCOSE UR QL STRIP.AUTO: NEGATIVE MG/DL
HGB UR QL STRIP.AUTO: (no result)
KETONES UR QL STRIP.AUTO: NEGATIVE MG/DL
LEUKOCYTE ESTERASE UR QL STRIP.AUTO: NEGATIVE
NITRITE UR QL STRIP.AUTO: NEGATIVE
PH UR STRIP.AUTO: 6 UNITS (ref 5–9)
PROT UR QL STRIP.AUTO: NEGATIVE MG/DL
RBC # UR AUTO: 0 /HPF (ref 0–3)
SP GR UR STRIP.AUTO: 1 (ref 1–1.03)
SQUAMOUS #/AREA URNS AUTO: 0 /HPF (ref 0–6)
UROBILINOGEN UR QL STRIP.AUTO: 0.2 MG/DL (ref 0–2)
WBC #/AREA URNS AUTO: 0 /HPF (ref 0–3)

## 2024-03-26 ENCOUNTER — HOSPITAL ENCOUNTER (OUTPATIENT)
Dept: HOSPITAL 53 - M SMT | Age: 89
End: 2024-03-26
Payer: MEDICARE

## 2024-03-26 DIAGNOSIS — N39.0: Primary | ICD-10-CM

## 2024-03-26 LAB
APPEARANCE UR: (no result)
BACTERIA UR QL AUTO: NEGATIVE
BILIRUB UR QL STRIP.AUTO: NEGATIVE
CAOX CRY URNS QL MICRO: (no result)
GLUCOSE UR QL STRIP.AUTO: NEGATIVE MG/DL
HGB UR QL STRIP.AUTO: NEGATIVE
KETONES UR QL STRIP.AUTO: (no result) MG/DL
LEUKOCYTE ESTERASE UR QL STRIP.AUTO: NEGATIVE
MUCOUS THREADS URNS QL MICRO: (no result)
NITRITE UR QL STRIP.AUTO: NEGATIVE
PH UR STRIP.AUTO: 5 UNITS (ref 5–9)
PROT UR QL STRIP.AUTO: NEGATIVE MG/DL
RBC # UR AUTO: 5 /HPF (ref 0–3)
SP GR UR STRIP.AUTO: 1.02 (ref 1–1.03)
SQUAMOUS #/AREA URNS AUTO: 0 /HPF (ref 0–6)
UROBILINOGEN UR QL STRIP.AUTO: 2 MG/DL (ref 0–2)
WBC #/AREA URNS AUTO: 2 /HPF (ref 0–3)